# Patient Record
Sex: FEMALE | Race: WHITE | Employment: OTHER | URBAN - METROPOLITAN AREA
[De-identification: names, ages, dates, MRNs, and addresses within clinical notes are randomized per-mention and may not be internally consistent; named-entity substitution may affect disease eponyms.]

---

## 2017-03-22 ENCOUNTER — OFFICE VISIT (OUTPATIENT)
Dept: FAMILY MEDICINE CLINIC | Age: 82
End: 2017-03-22

## 2017-03-22 ENCOUNTER — TELEPHONE (OUTPATIENT)
Dept: FAMILY MEDICINE CLINIC | Age: 82
End: 2017-03-22

## 2017-03-22 VITALS
BODY MASS INDEX: 23.13 KG/M2 | OXYGEN SATURATION: 96 % | HEART RATE: 80 BPM | SYSTOLIC BLOOD PRESSURE: 122 MMHG | DIASTOLIC BLOOD PRESSURE: 80 MMHG | WEIGHT: 139 LBS

## 2017-03-22 DIAGNOSIS — G25.2 INTENTION TREMOR: ICD-10-CM

## 2017-03-22 DIAGNOSIS — G25.2 RESTING TREMOR: ICD-10-CM

## 2017-03-22 DIAGNOSIS — E03.9 ACQUIRED HYPOTHYROIDISM: ICD-10-CM

## 2017-03-22 DIAGNOSIS — L98.9 FACIAL LESION: Primary | ICD-10-CM

## 2017-03-22 DIAGNOSIS — H61.21 IMPACTED CERUMEN OF RIGHT EAR: ICD-10-CM

## 2017-03-22 LAB
T4 FREE: 1.7 NG/DL (ref 0.9–1.8)
TSH REFLEX FT4: 1.49 UIU/ML (ref 0.27–4.2)

## 2017-03-22 PROCEDURE — 99214 OFFICE O/P EST MOD 30 MIN: CPT | Performed by: FAMILY MEDICINE

## 2017-03-22 RX ORDER — DESONIDE 0.5 MG/G
CREAM TOPICAL
Qty: 1 TUBE | Refills: 0 | Status: SHIPPED | OUTPATIENT
Start: 2017-03-22 | End: 2019-09-10 | Stop reason: ALTCHOICE

## 2017-03-22 RX ORDER — TRIAMCINOLONE ACETONIDE 0.25 MG/G
CREAM TOPICAL
Qty: 1 TUBE | Refills: 0 | Status: SHIPPED | OUTPATIENT
Start: 2017-03-22 | End: 2019-09-10 | Stop reason: ALTCHOICE

## 2017-03-22 ASSESSMENT — ENCOUNTER SYMPTOMS
SHORTNESS OF BREATH: 0
ABDOMINAL PAIN: 0
NAUSEA: 0
EYE DISCHARGE: 0
COLOR CHANGE: 0
EYE REDNESS: 0

## 2017-03-27 RX ORDER — PROPRANOLOL HYDROCHLORIDE 20 MG/1
TABLET ORAL
Qty: 270 TABLET | Refills: 3 | Status: SHIPPED | OUTPATIENT
Start: 2017-03-27 | End: 2018-02-23 | Stop reason: SDUPTHER

## 2017-03-27 RX ORDER — LEVOTHYROXINE SODIUM 112 UG/1
TABLET ORAL
Qty: 90 TABLET | Refills: 3 | Status: SHIPPED | OUTPATIENT
Start: 2017-03-27 | End: 2018-02-23 | Stop reason: SDUPTHER

## 2017-05-04 ENCOUNTER — TELEPHONE (OUTPATIENT)
Dept: FAMILY MEDICINE CLINIC | Age: 82
End: 2017-05-04

## 2017-05-05 ENCOUNTER — OFFICE VISIT (OUTPATIENT)
Dept: FAMILY MEDICINE CLINIC | Age: 82
End: 2017-05-05

## 2017-05-05 VITALS
TEMPERATURE: 98.1 F | SYSTOLIC BLOOD PRESSURE: 132 MMHG | HEART RATE: 60 BPM | BODY MASS INDEX: 23.36 KG/M2 | RESPIRATION RATE: 16 BRPM | HEIGHT: 65 IN | DIASTOLIC BLOOD PRESSURE: 78 MMHG | OXYGEN SATURATION: 94 % | WEIGHT: 140.2 LBS

## 2017-05-05 DIAGNOSIS — M54.2 NECK PAIN: Primary | ICD-10-CM

## 2017-05-05 PROCEDURE — 3288F FALL RISK ASSESSMENT DOCD: CPT | Performed by: NURSE PRACTITIONER

## 2017-05-05 PROCEDURE — G8510 SCR DEP NEG, NO PLAN REQD: HCPCS | Performed by: NURSE PRACTITIONER

## 2017-05-05 PROCEDURE — 99213 OFFICE O/P EST LOW 20 MIN: CPT | Performed by: NURSE PRACTITIONER

## 2017-05-05 ASSESSMENT — ENCOUNTER SYMPTOMS
ALLERGIC/IMMUNOLOGIC NEGATIVE: 1
GASTROINTESTINAL NEGATIVE: 1
EYES NEGATIVE: 1
PHOTOPHOBIA: 0
TROUBLE SWALLOWING: 0
VISUAL CHANGE: 0
RESPIRATORY NEGATIVE: 1
BACK PAIN: 0

## 2017-05-05 ASSESSMENT — PATIENT HEALTH QUESTIONNAIRE - PHQ9
SUM OF ALL RESPONSES TO PHQ9 QUESTIONS 1 & 2: 0
2. FEELING DOWN, DEPRESSED OR HOPELESS: 0
1. LITTLE INTEREST OR PLEASURE IN DOING THINGS: 0
SUM OF ALL RESPONSES TO PHQ QUESTIONS 1-9: 0

## 2017-07-27 ENCOUNTER — OFFICE VISIT (OUTPATIENT)
Dept: FAMILY MEDICINE CLINIC | Age: 82
End: 2017-07-27

## 2017-07-27 VITALS
DIASTOLIC BLOOD PRESSURE: 78 MMHG | WEIGHT: 132 LBS | SYSTOLIC BLOOD PRESSURE: 138 MMHG | HEART RATE: 60 BPM | BODY MASS INDEX: 21.99 KG/M2 | HEIGHT: 65 IN | OXYGEN SATURATION: 96 %

## 2017-07-27 DIAGNOSIS — H61.23 BILATERAL IMPACTED CERUMEN: Primary | ICD-10-CM

## 2017-07-27 PROCEDURE — 99213 OFFICE O/P EST LOW 20 MIN: CPT | Performed by: NURSE PRACTITIONER

## 2017-07-27 ASSESSMENT — ENCOUNTER SYMPTOMS
SORE THROAT: 0
DIARRHEA: 0
ABDOMINAL PAIN: 0
RHINORRHEA: 0
COUGH: 0

## 2017-12-06 DIAGNOSIS — G25.0 TREMOR, ESSENTIAL: ICD-10-CM

## 2017-12-08 RX ORDER — PRIMIDONE 50 MG/1
TABLET ORAL
Qty: 270 TABLET | Refills: 3 | Status: SHIPPED | OUTPATIENT
Start: 2017-12-08 | End: 2018-09-27 | Stop reason: SDUPTHER

## 2017-12-21 ENCOUNTER — OFFICE VISIT (OUTPATIENT)
Dept: FAMILY MEDICINE CLINIC | Age: 82
End: 2017-12-21

## 2017-12-21 VITALS
WEIGHT: 136 LBS | BODY MASS INDEX: 22.66 KG/M2 | RESPIRATION RATE: 16 BRPM | OXYGEN SATURATION: 98 % | TEMPERATURE: 97.8 F | HEIGHT: 65 IN | DIASTOLIC BLOOD PRESSURE: 70 MMHG | HEART RATE: 63 BPM | SYSTOLIC BLOOD PRESSURE: 152 MMHG

## 2017-12-21 DIAGNOSIS — R05.9 COUGH: Primary | ICD-10-CM

## 2017-12-21 DIAGNOSIS — J06.9 UPPER RESPIRATORY TRACT INFECTION, UNSPECIFIED TYPE: ICD-10-CM

## 2017-12-21 PROCEDURE — 1036F TOBACCO NON-USER: CPT | Performed by: NURSE PRACTITIONER

## 2017-12-21 PROCEDURE — G8484 FLU IMMUNIZE NO ADMIN: HCPCS | Performed by: NURSE PRACTITIONER

## 2017-12-21 PROCEDURE — 99213 OFFICE O/P EST LOW 20 MIN: CPT | Performed by: NURSE PRACTITIONER

## 2017-12-21 PROCEDURE — 1123F ACP DISCUSS/DSCN MKR DOCD: CPT | Performed by: NURSE PRACTITIONER

## 2017-12-21 PROCEDURE — 1090F PRES/ABSN URINE INCON ASSESS: CPT | Performed by: NURSE PRACTITIONER

## 2017-12-21 PROCEDURE — G8427 DOCREV CUR MEDS BY ELIG CLIN: HCPCS | Performed by: NURSE PRACTITIONER

## 2017-12-21 PROCEDURE — G8420 CALC BMI NORM PARAMETERS: HCPCS | Performed by: NURSE PRACTITIONER

## 2017-12-21 PROCEDURE — 4040F PNEUMOC VAC/ADMIN/RCVD: CPT | Performed by: NURSE PRACTITIONER

## 2017-12-21 PROCEDURE — G8399 PT W/DXA RESULTS DOCUMENT: HCPCS | Performed by: NURSE PRACTITIONER

## 2017-12-21 ASSESSMENT — ENCOUNTER SYMPTOMS
SHORTNESS OF BREATH: 0
SORE THROAT: 0
HEMOPTYSIS: 0
COUGH: 1
WHEEZING: 0
RHINORRHEA: 0

## 2017-12-21 NOTE — PROGRESS NOTES
Subjective:      Patient ID: Annie Boogie is a 81 y.o. female. Robin Wilks is here with complaints of cough for the past 4-5 days, feeling slightly tired. Cough is productive every once in a while. Has seasonal allergies and chronic post nasal drip. She denies sore throat, fever, shortness of breath or chest pain. Cough   This is a new problem. The current episode started 1 to 4 weeks ago. The cough is productive of sputum. Associated symptoms include postnasal drip (chronic). Pertinent negatives include no chills, ear congestion, ear pain, fever, headaches, hemoptysis, myalgias, nasal congestion, rash, rhinorrhea, sore throat, shortness of breath, sweats, weight loss or wheezing. Review of Systems   Constitutional: Negative for chills, fever and weight loss. HENT: Positive for postnasal drip (chronic). Negative for ear pain, rhinorrhea and sore throat. Respiratory: Positive for cough. Negative for hemoptysis, shortness of breath and wheezing. Musculoskeletal: Negative for myalgias. Skin: Negative for rash. Neurological: Negative for headaches. Vitals:    12/21/17 1540   BP: (!) 152/70   Site: Left Arm   Position: Sitting   Pulse: 63   Resp: 16   Temp: 97.8 °F (36.6 °C)   TempSrc: Oral   SpO2: 98%   Weight: 136 lb (61.7 kg)   Height: 5' 5\" (1.651 m)     Objective:   Physical Exam   Constitutional: She appears well-developed and well-nourished. No distress. HENT:   Head: Normocephalic and atraumatic. Right Ear: External ear normal.   Left Ear: External ear normal.   Nose: Nose normal.   Mouth/Throat: Oropharynx is clear and moist. No oropharyngeal exudate. Neck: Normal range of motion. Neck supple. Cardiovascular: Normal rate, regular rhythm and normal heart sounds. Exam reveals no gallop and no friction rub. No murmur heard. Pulmonary/Chest: Effort normal and breath sounds normal. No respiratory distress. She has no wheezes. She has no rales. She exhibits no tenderness. Clear, good air movement   Lymphadenopathy:     She has no cervical adenopathy. Neurological: She is alert. No cranial nerve deficit. Benign tremor   Skin: Skin is warm and dry. No rash noted. She is not diaphoretic. No erythema. Nursing note and vitals reviewed. Assessment:   1. Upper respiratory tract infection, unspecified type    2. Cough    Plan:   - Discussed symptoms likely viral in nature and that antibiotics would be beneficial  - Supportive treatments: fluids, rest, acetaminophen PRN and Mucinex DM   - She will follow up in of the office in 1-2 weeks if her symptoms do not improve or sooner if symptoms worsen. - She is agreeable to above plan    Outpatient Encounter Prescriptions as of 12/21/2017   Medication Sig Dispense Refill    primidone (MYSOLINE) 50 MG tablet TAKE 1 TABLET THREE TIMES DAILY 270 tablet 3    levothyroxine (SYNTHROID) 112 MCG tablet TAKE 1 TABLET EVERY DAY 90 tablet 3    propranolol (INDERAL) 20 MG tablet TAKE 1 TABLET THREE TIMES DAILY 270 tablet 3    desonide (DESOWEN) 0.05 % cream Apply topically 2 times daily. 1 Tube 0    triamcinolone (KENALOG) 0.025 % cream Apply topically 1 times daily for 2 weeks. 1 Tube 0    clotrimazole-betamethasone (LOTRISONE) 1-0.05 % cream Apply topically 2 times daily. 1 Tube 1    loratadine (CLEAR-ATADINE) 10 MG tablet Take 10 mg by mouth daily.  aspirin 81 MG chewable tablet Take 81 mg by mouth daily.  docusate sodium (COLACE) 100 MG capsule Take 100 mg by mouth as needed.  ibuprofen (ADVIL;MOTRIN) 200 MG CAPS Take 1 capsule by mouth as needed.  Multiple Vitamin (MULTI-VITAMIN) TABS Take 1 tablet by mouth as needed. No facility-administered encounter medications on file as of 12/21/2017.

## 2018-02-23 RX ORDER — PROPRANOLOL HYDROCHLORIDE 20 MG/1
TABLET ORAL
Qty: 270 TABLET | Refills: 3 | Status: SHIPPED | OUTPATIENT
Start: 2018-02-23 | End: 2018-10-17

## 2018-02-23 RX ORDER — LEVOTHYROXINE SODIUM 112 UG/1
TABLET ORAL
Qty: 90 TABLET | Refills: 3 | Status: SHIPPED | OUTPATIENT
Start: 2018-02-23 | End: 2019-10-30 | Stop reason: SDUPTHER

## 2018-04-25 ENCOUNTER — OFFICE VISIT (OUTPATIENT)
Dept: FAMILY MEDICINE CLINIC | Age: 83
End: 2018-04-25

## 2018-04-25 VITALS
BODY MASS INDEX: 22.66 KG/M2 | HEART RATE: 55 BPM | HEIGHT: 65 IN | OXYGEN SATURATION: 92 % | WEIGHT: 136 LBS | SYSTOLIC BLOOD PRESSURE: 156 MMHG | DIASTOLIC BLOOD PRESSURE: 80 MMHG

## 2018-04-25 DIAGNOSIS — H61.23 BILATERAL IMPACTED CERUMEN: Primary | ICD-10-CM

## 2018-04-25 PROCEDURE — 1036F TOBACCO NON-USER: CPT | Performed by: FAMILY MEDICINE

## 2018-04-25 PROCEDURE — G8399 PT W/DXA RESULTS DOCUMENT: HCPCS | Performed by: FAMILY MEDICINE

## 2018-04-25 PROCEDURE — 1123F ACP DISCUSS/DSCN MKR DOCD: CPT | Performed by: FAMILY MEDICINE

## 2018-04-25 PROCEDURE — 4040F PNEUMOC VAC/ADMIN/RCVD: CPT | Performed by: FAMILY MEDICINE

## 2018-04-25 PROCEDURE — 99213 OFFICE O/P EST LOW 20 MIN: CPT | Performed by: FAMILY MEDICINE

## 2018-04-25 PROCEDURE — G8427 DOCREV CUR MEDS BY ELIG CLIN: HCPCS | Performed by: FAMILY MEDICINE

## 2018-04-25 PROCEDURE — 1090F PRES/ABSN URINE INCON ASSESS: CPT | Performed by: FAMILY MEDICINE

## 2018-04-25 PROCEDURE — G8420 CALC BMI NORM PARAMETERS: HCPCS | Performed by: FAMILY MEDICINE

## 2018-04-25 ASSESSMENT — ENCOUNTER SYMPTOMS
SHORTNESS OF BREATH: 0
DIARRHEA: 0
SINUS PRESSURE: 0
CONSTIPATION: 0
SINUS PAIN: 0
NAUSEA: 0
VOMITING: 0
EYE REDNESS: 0

## 2018-07-20 ENCOUNTER — OFFICE VISIT (OUTPATIENT)
Dept: FAMILY MEDICINE CLINIC | Age: 83
End: 2018-07-20

## 2018-07-20 VITALS
DIASTOLIC BLOOD PRESSURE: 80 MMHG | BODY MASS INDEX: 22.59 KG/M2 | WEIGHT: 135.6 LBS | HEART RATE: 57 BPM | OXYGEN SATURATION: 97 % | SYSTOLIC BLOOD PRESSURE: 138 MMHG | HEIGHT: 65 IN

## 2018-07-20 DIAGNOSIS — Z00.00 ROUTINE GENERAL MEDICAL EXAMINATION AT A HEALTH CARE FACILITY: Primary | ICD-10-CM

## 2018-07-20 DIAGNOSIS — Z00.00 ROUTINE GENERAL MEDICAL EXAMINATION AT A HEALTH CARE FACILITY: ICD-10-CM

## 2018-07-20 LAB
A/G RATIO: 1.7 (ref 1.1–2.2)
ALBUMIN SERPL-MCNC: 4.4 G/DL (ref 3.4–5)
ALP BLD-CCNC: 53 U/L (ref 40–129)
ALT SERPL-CCNC: 15 U/L (ref 10–40)
ANION GAP SERPL CALCULATED.3IONS-SCNC: 14 MMOL/L (ref 3–16)
AST SERPL-CCNC: 21 U/L (ref 15–37)
BILIRUB SERPL-MCNC: 0.5 MG/DL (ref 0–1)
BUN BLDV-MCNC: 12 MG/DL (ref 7–20)
CALCIUM SERPL-MCNC: 9.7 MG/DL (ref 8.3–10.6)
CHLORIDE BLD-SCNC: 101 MMOL/L (ref 99–110)
CHOLESTEROL, TOTAL: 169 MG/DL (ref 0–199)
CO2: 28 MMOL/L (ref 21–32)
CREAT SERPL-MCNC: 0.7 MG/DL (ref 0.6–1.2)
GFR AFRICAN AMERICAN: >60
GFR NON-AFRICAN AMERICAN: >60
GLOBULIN: 2.6 G/DL
GLUCOSE BLD-MCNC: 84 MG/DL (ref 70–99)
HCT VFR BLD CALC: 37.5 % (ref 36–48)
HDLC SERPL-MCNC: 73 MG/DL (ref 40–60)
HEMOGLOBIN: 12.4 G/DL (ref 12–16)
LDL CHOLESTEROL CALCULATED: 79 MG/DL
MCH RBC QN AUTO: 32.8 PG (ref 26–34)
MCHC RBC AUTO-ENTMCNC: 33.2 G/DL (ref 31–36)
MCV RBC AUTO: 98.7 FL (ref 80–100)
PDW BLD-RTO: 13.8 % (ref 12.4–15.4)
PLATELET # BLD: 206 K/UL (ref 135–450)
PMV BLD AUTO: 8.4 FL (ref 5–10.5)
POTASSIUM SERPL-SCNC: 3.9 MMOL/L (ref 3.5–5.1)
RBC # BLD: 3.8 M/UL (ref 4–5.2)
SODIUM BLD-SCNC: 143 MMOL/L (ref 136–145)
TOTAL PROTEIN: 7 G/DL (ref 6.4–8.2)
TRIGL SERPL-MCNC: 84 MG/DL (ref 0–150)
TSH REFLEX FT4: 3.55 UIU/ML (ref 0.27–4.2)
VLDLC SERPL CALC-MCNC: 17 MG/DL
WBC # BLD: 5.1 K/UL (ref 4–11)

## 2018-07-20 PROCEDURE — 4040F PNEUMOC VAC/ADMIN/RCVD: CPT | Performed by: FAMILY MEDICINE

## 2018-07-20 PROCEDURE — G0438 PPPS, INITIAL VISIT: HCPCS | Performed by: FAMILY MEDICINE

## 2018-07-20 ASSESSMENT — PATIENT HEALTH QUESTIONNAIRE - PHQ9
SUM OF ALL RESPONSES TO PHQ QUESTIONS 1-9: 0
SUM OF ALL RESPONSES TO PHQ QUESTIONS 1-9: 0

## 2018-07-20 ASSESSMENT — LIFESTYLE VARIABLES: HOW OFTEN DO YOU HAVE A DRINK CONTAINING ALCOHOL: 0

## 2018-07-20 ASSESSMENT — ANXIETY QUESTIONNAIRES: GAD7 TOTAL SCORE: 0

## 2018-07-20 NOTE — PATIENT INSTRUCTIONS
Personalized Preventive Plan for Pablo Craig - 7/20/2018  Medicare offers a range of preventive health benefits. Some of the tests and screenings are paid in full while other may be subject to a deductible, co-insurance, and/or copay. Some of these benefits include a comprehensive review of your medical history including lifestyle, illnesses that may run in your family, and various assessments and screenings as appropriate. After reviewing your medical record and screening and assessments performed today your provider may have ordered immunizations, labs, imaging, and/or referrals for you. A list of these orders (if applicable) as well as your Preventive Care list are included within your After Visit Summary for your review. Other Preventive Recommendations:    · A preventive eye exam performed by an eye specialist is recommended every 1-2 years to screen for glaucoma; cataracts, macular degeneration, and other eye disorders. · A preventive dental visit is recommended every 6 months. · Try to get at least 150 minutes of exercise per week or 10,000 steps per day on a pedometer . · Order or download the FREE \"Exercise & Physical Activity: Your Everyday Guide\" from [employer]. Call 7-624.295.2539 or search [employer] online. · You need 5749-0698 mg of calcium and 1010-8267 IU of vitamin D per day. It is possible to meet your calcium requirement with diet alone, but a vitamin D supplement is usually necessary to meet this goal.  · When exposed to the sun, use a sunscreen that protects against both UVA and UVB radiation with an SPF of 30 or greater. Reapply every 2 to 3 hours or after sweating, drying off with a towel, or swimming. · Always wear a seat belt when traveling in a car. Always wear a helmet when riding a bicycle or motorcycle. Your fasting blood sugar should be below 100.    If it is between 100-125 that is considered high and known as prediabetes, or  impaired glucose tolerance. If your blood sugar is too high, go to diabetes. org for a diabetes prevention diet. If your blood sugar is above 125 on 2 occassions, fasting, then that meets the criteria for  diagnosis of diabetes. For your weight, exercise 30 minutes 5 times weekly and improve the types of food you eat:    Protein   Best options: The American Diabetes Association (ADA) recommends lean proteins low in saturated fat, like fish or turkey. Aim for two servings of seafood each week; some fish, like salmon, have the added benefit of containing heart healthy omega-3 fats. For a vegetarian protein source, experiment with the wide variety of beans. Nuts, which are protein and healthy fats powerhouses, are also a choice.  Worst options: Processed deli meats and hot dogs have high amounts of fat along with lots of sodium, which can increase the risk of high blood pressure. Heart attack and stroke are two common complications of diabetes, so keeping blood pressure in check is important. Grains   Best options: When choosing grains, make sure theyre whole. Decrease the amount of foods which contain flour. Whole grains such as wild rice, quinoa, and whole grain breads and cereals contain fiber, which is beneficial for digestive health, but often the grains flour products raise your blood sugar and when your blood sugar returns to normal, it can trigger the desire to eat again.   Worst options: Refined white flour doesnt contain the same health benefits as whole grains. Processed foods made with white flour include breakfast cereals, white bread, and pastries, cookies, muffins, pretzels, crackers, so avoid these options. Also try to steer clear of white rice and pasta. Dairy   Best options: With only 6 to 8 grams of carbohydrates in a serving, plain nonfat [place] yogurt is a healthy and versatile dairy option.  You can add berries and enjoy it for dessert or breakfast; you can use it in recipes as a replacement for sour cream, which is high in saturated fat. Skim milk.  Worst options: Avoid all full-fat dairy products and especially packaged chocolate milk, as it also has added sugar. Avoid yogurts with added sugar. Vegetables   Best options: Non-starchy vegetables such as leafy greens, broccoli, cauliflower, asparagus, and carrots are low in carbohydrates and high in fiber and other nutrients, Alec Salazar says. You can eat non-starchy vegetables in abundance  half of your plate should be filled with these veggies. If youre craving mashed potatoes, give mashed cauliflower a try.  Worst options: Stick to small portions of starchy vegetables such as corn, potatoes, and peas. These items are nutritious, but should be eaten in moderation. The ADA groups them with grains because of their high carb content. Fruit   Best options: Fresh fruit can conquer your craving for sweets while providing antioxidants and fiber. Berries are a great option because recommended portion sizes are typically generous, which may leave you feeling more satisfied   Worst options: Avoid added sugar by eliminating fruits canned in syrup, and be aware that dried fruits have a very high sugar concentration. Also, fruit juices should be consumed rarely as theyre high in sugar and dont contain the same nutrients as whole fruit. Fats   Best options: Some types of fat actually help protect your heart. Choose the monounsaturated fats found in avocados, almonds, olives, and pecans or the polyunsaturated fats found in walnuts and sunflower oil, which can help to lower bad cholesterol.  Worst options: Saturated fats increase bad cholesterol, so limit butter, cheese, gravy, and fried foods. Keep calories from animal sources of saturated fat to less than 10 percent of your total daily intake. Trans fats are even worse than saturated fats, so avoid them completely.  Look for the term hydrogenated on labels of processed foods supplement which can raise the HDL good cholesterol. 2-4 grams a day is recommended. Learn more on the Internet. Check out these resources:    American Heart Association   Heart. [address]:   [employer]    Triglycerides can be lowered without medication by exercising, and loosing weight and eating a diet lower in carbohydrates especially from flour sources,  and avoiding simple sugars. Omega 3 fatty acids can help lower triglyceride levels, 2-4 grams a day. The good cholesterol is HDL. In order to increase the good cholesterol, increasing cardiovascular exercise helps. Avoid hydrogenated oils (trans fats) in processed, bakery,  and junk food. Use monounsaturated fats such as olive oil can help raise the good cholesterol.

## 2018-09-27 DIAGNOSIS — G25.0 TREMOR, ESSENTIAL: ICD-10-CM

## 2018-09-27 RX ORDER — PRIMIDONE 50 MG/1
TABLET ORAL
Qty: 270 TABLET | Refills: 3 | Status: SHIPPED | OUTPATIENT
Start: 2018-09-27 | End: 2019-07-15 | Stop reason: SDUPTHER

## 2018-10-17 ENCOUNTER — OFFICE VISIT (OUTPATIENT)
Dept: FAMILY MEDICINE CLINIC | Age: 83
End: 2018-10-17
Payer: MEDICARE

## 2018-10-17 VITALS
BODY MASS INDEX: 22.99 KG/M2 | OXYGEN SATURATION: 92 % | HEART RATE: 51 BPM | HEIGHT: 65 IN | WEIGHT: 138 LBS | DIASTOLIC BLOOD PRESSURE: 82 MMHG | SYSTOLIC BLOOD PRESSURE: 154 MMHG

## 2018-10-17 DIAGNOSIS — H61.23 BILATERAL IMPACTED CERUMEN: ICD-10-CM

## 2018-10-17 DIAGNOSIS — I10 ESSENTIAL HYPERTENSION: Primary | ICD-10-CM

## 2018-10-17 DIAGNOSIS — Z91.81 AT HIGH RISK FOR FALLS: ICD-10-CM

## 2018-10-17 DIAGNOSIS — J47.9 BRONCHIOLECTASIS (HCC): ICD-10-CM

## 2018-10-17 DIAGNOSIS — G25.0 TREMOR, ESSENTIAL: ICD-10-CM

## 2018-10-17 PROCEDURE — 1123F ACP DISCUSS/DSCN MKR DOCD: CPT | Performed by: FAMILY MEDICINE

## 2018-10-17 PROCEDURE — G0008 ADMIN INFLUENZA VIRUS VAC: HCPCS | Performed by: FAMILY MEDICINE

## 2018-10-17 PROCEDURE — G8427 DOCREV CUR MEDS BY ELIG CLIN: HCPCS | Performed by: FAMILY MEDICINE

## 2018-10-17 PROCEDURE — G8420 CALC BMI NORM PARAMETERS: HCPCS | Performed by: FAMILY MEDICINE

## 2018-10-17 PROCEDURE — G0009 ADMIN PNEUMOCOCCAL VACCINE: HCPCS | Performed by: FAMILY MEDICINE

## 2018-10-17 PROCEDURE — 1036F TOBACCO NON-USER: CPT | Performed by: FAMILY MEDICINE

## 2018-10-17 PROCEDURE — 1101F PT FALLS ASSESS-DOCD LE1/YR: CPT | Performed by: FAMILY MEDICINE

## 2018-10-17 PROCEDURE — 4040F PNEUMOC VAC/ADMIN/RCVD: CPT | Performed by: FAMILY MEDICINE

## 2018-10-17 PROCEDURE — G8482 FLU IMMUNIZE ORDER/ADMIN: HCPCS | Performed by: FAMILY MEDICINE

## 2018-10-17 PROCEDURE — 99214 OFFICE O/P EST MOD 30 MIN: CPT | Performed by: FAMILY MEDICINE

## 2018-10-17 PROCEDURE — 90662 IIV NO PRSV INCREASED AG IM: CPT | Performed by: FAMILY MEDICINE

## 2018-10-17 PROCEDURE — 90670 PCV13 VACCINE IM: CPT | Performed by: FAMILY MEDICINE

## 2018-10-17 PROCEDURE — G8399 PT W/DXA RESULTS DOCUMENT: HCPCS | Performed by: FAMILY MEDICINE

## 2018-10-17 PROCEDURE — 1090F PRES/ABSN URINE INCON ASSESS: CPT | Performed by: FAMILY MEDICINE

## 2018-10-17 RX ORDER — PROPRANOLOL HYDROCHLORIDE 20 MG/1
TABLET ORAL
Qty: 270 TABLET | Refills: 3 | Status: CANCELLED | OUTPATIENT
Start: 2018-10-17

## 2018-10-17 RX ORDER — PROPRANOLOL HYDROCHLORIDE 80 MG/1
80 CAPSULE, EXTENDED RELEASE ORAL DAILY
Qty: 90 CAPSULE | Refills: 3 | Status: SHIPPED | OUTPATIENT
Start: 2018-10-17 | End: 2018-11-02

## 2018-10-17 RX ORDER — PROPRANOLOL HYDROCHLORIDE 80 MG/1
80 CAPSULE, EXTENDED RELEASE ORAL DAILY
Qty: 30 CAPSULE | Refills: 3 | Status: SHIPPED | OUTPATIENT
Start: 2018-10-17 | End: 2018-10-17 | Stop reason: SDUPTHER

## 2018-10-17 ASSESSMENT — ENCOUNTER SYMPTOMS
NAUSEA: 0
EYE REDNESS: 0
SHORTNESS OF BREATH: 0
VOMITING: 0
CONSTIPATION: 0
DIARRHEA: 0

## 2018-10-19 RX ORDER — METOPROLOL SUCCINATE 100 MG/1
100 TABLET, EXTENDED RELEASE ORAL DAILY
Qty: 30 TABLET | Refills: 3 | Status: SHIPPED | OUTPATIENT
Start: 2018-10-19 | End: 2018-11-20

## 2018-10-19 NOTE — TELEPHONE ENCOUNTER
Per pt she  Stated that the Propranolol is to expensive so she called [facility] and they told her that Carvedilol would be better out of [ocket for her.   misty would like to know if you can send that to [address] for her

## 2018-10-31 ENCOUNTER — TELEPHONE (OUTPATIENT)
Dept: FAMILY MEDICINE CLINIC | Age: 83
End: 2018-10-31

## 2018-10-31 ASSESSMENT — ENCOUNTER SYMPTOMS
VOMITING: 0
NAUSEA: 0
SHORTNESS OF BREATH: 0
EYE REDNESS: 0
CONSTIPATION: 0
DIARRHEA: 0

## 2018-10-31 NOTE — TELEPHONE ENCOUNTER
Patient called in and stated that she was really shaky. She said that her medication was changed recently. The message from last week stated that the propranolol was too expensive so metoprolol was sent in to replace it. But the patient states that she did not say that and she needs the propranolol. She was under the impression that the metoprolol had the propranolol in it. I think she is confused on what she is suppose to be taking along with the message from 10/19 not being accurate.

## 2018-11-01 NOTE — PROGRESS NOTES
as needed.  ibuprofen (ADVIL;MOTRIN) 200 MG CAPS Take 1 capsule by mouth as needed.  Multiple Vitamin (MULTI-VITAMIN) TABS Take 1 tablet by mouth as needed.  metoprolol succinate (TOPROL XL) 100 MG extended release tablet Take 1 tablet by mouth daily 30 tablet 3     No current facility-administered medications for this visit. Vitals:    11/02/18 1115   BP: (!) 166/82   Pulse:    SpO2:          Physical Exam  Physical Exam   Constitutional: She is oriented to person, place, and time. She appears well-developed and well-nourished. No distress. HENT:   Head: Normocephalic and atraumatic. She does have 2 sutures in her lower left jaw and no pus coming from it but there is erythema around the gumline. Eyes: Conjunctivae are normal. No scleral icterus. Cardiovascular: Normal rate. Pulmonary/Chest: Effort normal. No stridor. Musculoskeletal: She exhibits no edema. Neurological: She is alert and oriented to person, place, and time. Tremors chronic stable   Skin: Skin is warm. She is not diaphoretic. No erythema. Psychiatric: She has a normal mood and affect. Her behavior is normal. Judgment and thought content normal.        Diagnosis Orders   1. Uncontrolled hypertension     2. Essential tremor     3. Family history of first degree relative with dementia  Darryl Pace, James Jurado MD, Neurology, [facility]         Uncontrolled hypertension  We will put her back on a propranolol 3 times a day for her tremors, this does not control her blood pressure so we will add a different type of blood pressure medicine such as lisinopril today. We try a beta blocker to control both tremor and blood pressure but it wasn't effective and she did not tolerate the medication because of the worse. Essential tremor    Family history of first degree relative with dementia  -     Sincere Gómez MD, Neurology, [facility]    Other orders  -     propranolol (INDERAL) 20 MG tablet;  Take 1 tablet by mouth 3 times daily  -     lisinopril (PRINIVIL;ZESTRIL) 10 MG tablet; Take 1 tablet by mouth daily          An electronic signature was used to authenticate this note. This was dictated. Errors mightbe possible due to dictation.   --Rod Hansen, DO

## 2018-11-02 ENCOUNTER — OFFICE VISIT (OUTPATIENT)
Dept: FAMILY MEDICINE CLINIC | Age: 83
End: 2018-11-02
Payer: MEDICARE

## 2018-11-02 VITALS
DIASTOLIC BLOOD PRESSURE: 82 MMHG | OXYGEN SATURATION: 94 % | SYSTOLIC BLOOD PRESSURE: 166 MMHG | BODY MASS INDEX: 22.99 KG/M2 | HEIGHT: 65 IN | WEIGHT: 138 LBS | HEART RATE: 67 BPM

## 2018-11-02 DIAGNOSIS — Z81.8 FAMILY HISTORY OF FIRST DEGREE RELATIVE WITH DEMENTIA: ICD-10-CM

## 2018-11-02 DIAGNOSIS — G25.0 ESSENTIAL TREMOR: ICD-10-CM

## 2018-11-02 DIAGNOSIS — I10 UNCONTROLLED HYPERTENSION: Primary | ICD-10-CM

## 2018-11-02 PROCEDURE — G8399 PT W/DXA RESULTS DOCUMENT: HCPCS | Performed by: FAMILY MEDICINE

## 2018-11-02 PROCEDURE — 1101F PT FALLS ASSESS-DOCD LE1/YR: CPT | Performed by: FAMILY MEDICINE

## 2018-11-02 PROCEDURE — G8482 FLU IMMUNIZE ORDER/ADMIN: HCPCS | Performed by: FAMILY MEDICINE

## 2018-11-02 PROCEDURE — G8420 CALC BMI NORM PARAMETERS: HCPCS | Performed by: FAMILY MEDICINE

## 2018-11-02 PROCEDURE — 1036F TOBACCO NON-USER: CPT | Performed by: FAMILY MEDICINE

## 2018-11-02 PROCEDURE — 99214 OFFICE O/P EST MOD 30 MIN: CPT | Performed by: FAMILY MEDICINE

## 2018-11-02 PROCEDURE — 4040F PNEUMOC VAC/ADMIN/RCVD: CPT | Performed by: FAMILY MEDICINE

## 2018-11-02 PROCEDURE — 1123F ACP DISCUSS/DSCN MKR DOCD: CPT | Performed by: FAMILY MEDICINE

## 2018-11-02 PROCEDURE — G8427 DOCREV CUR MEDS BY ELIG CLIN: HCPCS | Performed by: FAMILY MEDICINE

## 2018-11-02 PROCEDURE — 1090F PRES/ABSN URINE INCON ASSESS: CPT | Performed by: FAMILY MEDICINE

## 2018-11-02 RX ORDER — LISINOPRIL 10 MG/1
10 TABLET ORAL DAILY
Qty: 90 TABLET | Refills: 3 | Status: SHIPPED | OUTPATIENT
Start: 2018-11-02 | End: 2019-09-10

## 2018-11-02 RX ORDER — PROPRANOLOL HYDROCHLORIDE 20 MG/1
20 TABLET ORAL 3 TIMES DAILY
Qty: 90 TABLET | Refills: 3 | Status: SHIPPED | OUTPATIENT
Start: 2018-11-02 | End: 2018-11-20 | Stop reason: SDUPTHER

## 2018-11-19 ASSESSMENT — ENCOUNTER SYMPTOMS
NAUSEA: 0
CONSTIPATION: 0
SHORTNESS OF BREATH: 0
EYE REDNESS: 0
DIARRHEA: 0
VOMITING: 0

## 2018-11-20 ENCOUNTER — OFFICE VISIT (OUTPATIENT)
Dept: FAMILY MEDICINE CLINIC | Age: 83
End: 2018-11-20
Payer: MEDICARE

## 2018-11-20 VITALS
OXYGEN SATURATION: 93 % | HEART RATE: 58 BPM | HEIGHT: 65 IN | DIASTOLIC BLOOD PRESSURE: 84 MMHG | WEIGHT: 136 LBS | BODY MASS INDEX: 22.66 KG/M2 | SYSTOLIC BLOOD PRESSURE: 152 MMHG

## 2018-11-20 DIAGNOSIS — G25.0 TREMOR, ESSENTIAL: ICD-10-CM

## 2018-11-20 DIAGNOSIS — I10 UNCONTROLLED HYPERTENSION: Primary | ICD-10-CM

## 2018-11-20 PROCEDURE — 1036F TOBACCO NON-USER: CPT | Performed by: FAMILY MEDICINE

## 2018-11-20 PROCEDURE — 1123F ACP DISCUSS/DSCN MKR DOCD: CPT | Performed by: FAMILY MEDICINE

## 2018-11-20 PROCEDURE — G8399 PT W/DXA RESULTS DOCUMENT: HCPCS | Performed by: FAMILY MEDICINE

## 2018-11-20 PROCEDURE — G8427 DOCREV CUR MEDS BY ELIG CLIN: HCPCS | Performed by: FAMILY MEDICINE

## 2018-11-20 PROCEDURE — G8482 FLU IMMUNIZE ORDER/ADMIN: HCPCS | Performed by: FAMILY MEDICINE

## 2018-11-20 PROCEDURE — 1101F PT FALLS ASSESS-DOCD LE1/YR: CPT | Performed by: FAMILY MEDICINE

## 2018-11-20 PROCEDURE — 4040F PNEUMOC VAC/ADMIN/RCVD: CPT | Performed by: FAMILY MEDICINE

## 2018-11-20 PROCEDURE — 1090F PRES/ABSN URINE INCON ASSESS: CPT | Performed by: FAMILY MEDICINE

## 2018-11-20 PROCEDURE — 99214 OFFICE O/P EST MOD 30 MIN: CPT | Performed by: FAMILY MEDICINE

## 2018-11-20 PROCEDURE — G8420 CALC BMI NORM PARAMETERS: HCPCS | Performed by: FAMILY MEDICINE

## 2018-11-20 RX ORDER — LISINOPRIL 20 MG/1
20 TABLET ORAL DAILY
Qty: 90 TABLET | Refills: 1 | Status: CANCELLED | OUTPATIENT
Start: 2018-11-20

## 2018-11-20 RX ORDER — PROPRANOLOL HYDROCHLORIDE 20 MG/1
20 TABLET ORAL 4 TIMES DAILY
Qty: 120 TABLET | Refills: 3 | Status: SHIPPED | OUTPATIENT
Start: 2018-11-20 | End: 2019-04-18 | Stop reason: ALTCHOICE

## 2019-02-12 ENCOUNTER — INITIAL CONSULT (OUTPATIENT)
Dept: NEUROLOGY | Age: 84
End: 2019-02-12
Payer: MEDICARE

## 2019-02-12 VITALS
BODY MASS INDEX: 22.66 KG/M2 | SYSTOLIC BLOOD PRESSURE: 145 MMHG | WEIGHT: 136 LBS | OXYGEN SATURATION: 96 % | DIASTOLIC BLOOD PRESSURE: 75 MMHG | HEART RATE: 93 BPM | HEIGHT: 65 IN

## 2019-02-12 DIAGNOSIS — E03.9 ACQUIRED HYPOTHYROIDISM: ICD-10-CM

## 2019-02-12 DIAGNOSIS — I10 HTN (HYPERTENSION), BENIGN: ICD-10-CM

## 2019-02-12 DIAGNOSIS — G25.0 ESSENTIAL TREMOR: ICD-10-CM

## 2019-02-12 DIAGNOSIS — G30.9 DEMENTIA DUE TO ALZHEIMER'S DISEASE (HCC): Primary | ICD-10-CM

## 2019-02-12 DIAGNOSIS — F02.80 DEMENTIA DUE TO ALZHEIMER'S DISEASE (HCC): Primary | ICD-10-CM

## 2019-02-12 PROCEDURE — 4040F PNEUMOC VAC/ADMIN/RCVD: CPT | Performed by: PSYCHIATRY & NEUROLOGY

## 2019-02-12 PROCEDURE — G8482 FLU IMMUNIZE ORDER/ADMIN: HCPCS | Performed by: PSYCHIATRY & NEUROLOGY

## 2019-02-12 PROCEDURE — 1123F ACP DISCUSS/DSCN MKR DOCD: CPT | Performed by: PSYCHIATRY & NEUROLOGY

## 2019-02-12 PROCEDURE — 1090F PRES/ABSN URINE INCON ASSESS: CPT | Performed by: PSYCHIATRY & NEUROLOGY

## 2019-02-12 PROCEDURE — 1101F PT FALLS ASSESS-DOCD LE1/YR: CPT | Performed by: PSYCHIATRY & NEUROLOGY

## 2019-02-12 PROCEDURE — G8420 CALC BMI NORM PARAMETERS: HCPCS | Performed by: PSYCHIATRY & NEUROLOGY

## 2019-02-12 PROCEDURE — G8399 PT W/DXA RESULTS DOCUMENT: HCPCS | Performed by: PSYCHIATRY & NEUROLOGY

## 2019-02-12 PROCEDURE — 99204 OFFICE O/P NEW MOD 45 MIN: CPT | Performed by: PSYCHIATRY & NEUROLOGY

## 2019-02-12 PROCEDURE — G8427 DOCREV CUR MEDS BY ELIG CLIN: HCPCS | Performed by: PSYCHIATRY & NEUROLOGY

## 2019-02-12 PROCEDURE — 1036F TOBACCO NON-USER: CPT | Performed by: PSYCHIATRY & NEUROLOGY

## 2019-02-12 RX ORDER — DONEPEZIL HYDROCHLORIDE 5 MG/1
5 TABLET, FILM COATED ORAL NIGHTLY
Qty: 30 TABLET | Refills: 2 | Status: SHIPPED | OUTPATIENT
Start: 2019-02-12 | End: 2019-04-18 | Stop reason: SDUPTHER

## 2019-02-14 ENCOUNTER — NURSE TRIAGE (OUTPATIENT)
Dept: OTHER | Facility: CLINIC | Age: 84
End: 2019-02-14

## 2019-02-14 ASSESSMENT — ENCOUNTER SYMPTOMS
DIARRHEA: 0
NAUSEA: 0
SHORTNESS OF BREATH: 0
VOMITING: 0
CONSTIPATION: 0
EYE REDNESS: 0

## 2019-02-15 ENCOUNTER — OFFICE VISIT (OUTPATIENT)
Dept: FAMILY MEDICINE CLINIC | Age: 84
End: 2019-02-15
Payer: MEDICARE

## 2019-02-15 VITALS
OXYGEN SATURATION: 97 % | BODY MASS INDEX: 22.66 KG/M2 | SYSTOLIC BLOOD PRESSURE: 124 MMHG | HEIGHT: 65 IN | WEIGHT: 136 LBS | HEART RATE: 59 BPM | DIASTOLIC BLOOD PRESSURE: 66 MMHG

## 2019-02-15 DIAGNOSIS — L60.0 INGROWN TOENAIL OF LEFT FOOT WITH INFECTION: ICD-10-CM

## 2019-02-15 DIAGNOSIS — I10 ESSENTIAL HYPERTENSION: Primary | ICD-10-CM

## 2019-02-15 LAB
FOLATE: >20 NG/ML (ref 4.78–24.2)
TSH SERPL DL<=0.05 MIU/L-ACNC: 3.51 UIU/ML (ref 0.27–4.2)
VITAMIN B-12: 725 PG/ML (ref 211–911)

## 2019-02-15 PROCEDURE — G8482 FLU IMMUNIZE ORDER/ADMIN: HCPCS | Performed by: FAMILY MEDICINE

## 2019-02-15 PROCEDURE — 4040F PNEUMOC VAC/ADMIN/RCVD: CPT | Performed by: FAMILY MEDICINE

## 2019-02-15 PROCEDURE — G8427 DOCREV CUR MEDS BY ELIG CLIN: HCPCS | Performed by: FAMILY MEDICINE

## 2019-02-15 PROCEDURE — 1123F ACP DISCUSS/DSCN MKR DOCD: CPT | Performed by: FAMILY MEDICINE

## 2019-02-15 PROCEDURE — 99214 OFFICE O/P EST MOD 30 MIN: CPT | Performed by: FAMILY MEDICINE

## 2019-02-15 PROCEDURE — G8399 PT W/DXA RESULTS DOCUMENT: HCPCS | Performed by: FAMILY MEDICINE

## 2019-02-15 PROCEDURE — 1090F PRES/ABSN URINE INCON ASSESS: CPT | Performed by: FAMILY MEDICINE

## 2019-02-15 PROCEDURE — G8420 CALC BMI NORM PARAMETERS: HCPCS | Performed by: FAMILY MEDICINE

## 2019-02-15 PROCEDURE — 1036F TOBACCO NON-USER: CPT | Performed by: FAMILY MEDICINE

## 2019-02-15 PROCEDURE — 1101F PT FALLS ASSESS-DOCD LE1/YR: CPT | Performed by: FAMILY MEDICINE

## 2019-02-15 RX ORDER — CEPHALEXIN 500 MG/1
500 CAPSULE ORAL 2 TIMES DAILY
Qty: 14 CAPSULE | Refills: 0 | Status: SHIPPED | OUTPATIENT
Start: 2019-02-15 | End: 2019-05-02

## 2019-02-15 ASSESSMENT — PATIENT HEALTH QUESTIONNAIRE - PHQ9
1. LITTLE INTEREST OR PLEASURE IN DOING THINGS: 0
SUM OF ALL RESPONSES TO PHQ9 QUESTIONS 1 & 2: 0
SUM OF ALL RESPONSES TO PHQ QUESTIONS 1-9: 0
2. FEELING DOWN, DEPRESSED OR HOPELESS: 0
SUM OF ALL RESPONSES TO PHQ QUESTIONS 1-9: 0

## 2019-02-16 LAB — TOTAL SYPHILLIS IGG/IGM: NORMAL

## 2019-02-19 ENCOUNTER — HOSPITAL ENCOUNTER (OUTPATIENT)
Dept: MRI IMAGING | Age: 84
Discharge: HOME | End: 2019-02-19
Payer: MEDICARE

## 2019-02-19 DIAGNOSIS — G30.9 DEMENTIA DUE TO ALZHEIMER'S DISEASE (HCC): ICD-10-CM

## 2019-02-19 DIAGNOSIS — F02.80 DEMENTIA DUE TO ALZHEIMER'S DISEASE (HCC): ICD-10-CM

## 2019-02-19 PROCEDURE — 70551 MRI BRAIN STEM W/O DYE: CPT

## 2019-04-15 DIAGNOSIS — G30.9 DEMENTIA DUE TO ALZHEIMER'S DISEASE (HCC): ICD-10-CM

## 2019-04-15 DIAGNOSIS — F02.80 DEMENTIA DUE TO ALZHEIMER'S DISEASE (HCC): ICD-10-CM

## 2019-04-16 RX ORDER — DONEPEZIL HYDROCHLORIDE 5 MG/1
TABLET, FILM COATED ORAL
Qty: 90 TABLET | Refills: 2 | OUTPATIENT
Start: 2019-04-16

## 2019-04-16 NOTE — TELEPHONE ENCOUNTER
Refill not appropriate.  #30 + 2 was escribed on 02.12.19, and patient has f/u scheduled for 04.18.19

## 2019-04-18 ENCOUNTER — OFFICE VISIT (OUTPATIENT)
Dept: NEUROLOGY | Age: 84
End: 2019-04-18
Payer: MEDICARE

## 2019-04-18 ENCOUNTER — TELEPHONE (OUTPATIENT)
Dept: NEUROLOGY | Age: 84
End: 2019-04-18

## 2019-04-18 VITALS
HEART RATE: 61 BPM | BODY MASS INDEX: 22.66 KG/M2 | WEIGHT: 136 LBS | DIASTOLIC BLOOD PRESSURE: 75 MMHG | SYSTOLIC BLOOD PRESSURE: 126 MMHG | OXYGEN SATURATION: 95 % | HEIGHT: 65 IN

## 2019-04-18 DIAGNOSIS — F02.80 DEMENTIA DUE TO ALZHEIMER'S DISEASE (HCC): ICD-10-CM

## 2019-04-18 DIAGNOSIS — G25.0 ESSENTIAL TREMOR: Primary | ICD-10-CM

## 2019-04-18 DIAGNOSIS — I10 HTN (HYPERTENSION), BENIGN: ICD-10-CM

## 2019-04-18 DIAGNOSIS — E03.9 ACQUIRED HYPOTHYROIDISM: ICD-10-CM

## 2019-04-18 DIAGNOSIS — G30.9 DEMENTIA DUE TO ALZHEIMER'S DISEASE (HCC): ICD-10-CM

## 2019-04-18 PROCEDURE — 99214 OFFICE O/P EST MOD 30 MIN: CPT | Performed by: PSYCHIATRY & NEUROLOGY

## 2019-04-18 PROCEDURE — G8427 DOCREV CUR MEDS BY ELIG CLIN: HCPCS | Performed by: PSYCHIATRY & NEUROLOGY

## 2019-04-18 PROCEDURE — G8420 CALC BMI NORM PARAMETERS: HCPCS | Performed by: PSYCHIATRY & NEUROLOGY

## 2019-04-18 PROCEDURE — 1036F TOBACCO NON-USER: CPT | Performed by: PSYCHIATRY & NEUROLOGY

## 2019-04-18 PROCEDURE — G8399 PT W/DXA RESULTS DOCUMENT: HCPCS | Performed by: PSYCHIATRY & NEUROLOGY

## 2019-04-18 PROCEDURE — 1090F PRES/ABSN URINE INCON ASSESS: CPT | Performed by: PSYCHIATRY & NEUROLOGY

## 2019-04-18 PROCEDURE — 4040F PNEUMOC VAC/ADMIN/RCVD: CPT | Performed by: PSYCHIATRY & NEUROLOGY

## 2019-04-18 PROCEDURE — 1123F ACP DISCUSS/DSCN MKR DOCD: CPT | Performed by: PSYCHIATRY & NEUROLOGY

## 2019-04-18 RX ORDER — DONEPEZIL HYDROCHLORIDE 10 MG/1
10 TABLET, FILM COATED ORAL NIGHTLY
Qty: 90 TABLET | Refills: 1 | Status: SHIPPED | OUTPATIENT
Start: 2019-04-18 | End: 2020-01-02 | Stop reason: SDUPTHER

## 2019-04-18 RX ORDER — PROPRANOLOL HCL 60 MG
60 CAPSULE, EXTENDED RELEASE 24HR ORAL DAILY
Qty: 30 CAPSULE | Refills: 2 | Status: SHIPPED | OUTPATIENT
Start: 2019-04-18 | End: 2019-07-29

## 2019-04-18 NOTE — TELEPHONE ENCOUNTER
I ask TS to make sure that he wanted patient to start taking the Inderol LA at 60 mg and not 80 mg, which was previously mentioned, and TS said that no, he wants patient to start on the 60 mg. Daughter notified.

## 2019-04-18 NOTE — PROGRESS NOTES
The patient came today for follow up regarding: dementia and chronic tremors. Since the patient's last visit, she had MRI of the brain which showed no acute abnormalities. She had normal TSH and B12. Patient was started on Aricept 5 mg daily. She denies any side effect from Aricept. She continues to have daily episodes of episodic short-term memory loss. Degree is mild to moderate. She can forget details of recent conversation or daily activity. The patient however remains active and independent in ADL. She drives only locally. No recent MVA or getting lost in direction. She is getting assistant and help her family. She denies any frequent headaches, aphasia or dysphagia. No recent or recurrent falling or passing out. She denies any psychosis or hallucination. Family denies any long-term memory loss. Normal triggers for her memory loss include stress, anxiety or is a distraction. No history of severe insomnia or parasomnia. History of benign essential tremors. Location in the hands more right than left and occasionally in her head. She is on Inderal 20 mg tid and Mysoline 50 mg tid. No side effect from these medications. Tremors are daily and can last for minutes. Triggers including eating or holding utensils. No resting tremors. No other triggers or other associated symptoms. History of hypertension and hypothyroid. She feels lisinopril could cause worsening of her tremors. She is on aspirin daily. No other new symptoms today. Other review of system was unremarkable.           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 Authorizing Provider   propranolol (INDERAL LA) 60 MG extended release capsule Take 1 capsule by mouth daily Yes Dillan Key MD   donepezil (ARICEPT) 10 MG tablet Take 1 tablet by mouth nightly Yes Dillan Key MD   cephALEXin (KEFLEX) 500 MG capsule Take 1 capsule by mouth 2 times daily Yes Radha Carver, DO   Calcium Carb-Cholecalciferol (CALCIUM 1000 + D) 1000-800 MG-UNIT TABS Take by mouth Yes Historical Provider, MD   Bioflavonoid Products (BIOFLEX PO) Take by mouth Yes Historical Provider, MD   Chromium Picolinate (CHROMIUM PICOLINATE LOUISE) 800 MCG TABS Take by mouth Yes Historical Provider, MD   lisinopril (PRINIVIL;ZESTRIL) 10 MG tablet Take 1 tablet by mouth daily Yes Radha Carver, DO   carbamide peroxide (DEBROX) 6.5 % otic solution Place 5 drops into both ears as needed (clogged feeling) Yes Radha Carver, DO   primidone (MYSOLINE) 50 MG tablet TAKE 1 TABLET THREE TIMES DAILY Yes Radha Carver, DO   levothyroxine (SYNTHROID) 112 MCG tablet TAKE 1 TABLET EVERY DAY Yes Radha Carver, DO   desonide (DESOWEN) 0.05 % cream Apply topically 2 times daily. Yes Radha Carver, DO   triamcinolone (KENALOG) 0.025 % cream Apply topically 1 times daily for 2 weeks. Yes Radha Carver, DO   clotrimazole-betamethasone (LOTRISONE) 1-0.05 % cream Apply topically 2 times daily. Yes Radha Carver, DO   loratadine (CLEAR-ATADINE) 10 MG tablet Take 10 mg by mouth daily. Yes Historical Provider, MD   aspirin 81 MG chewable tablet Take 81 mg by mouth daily. Yes Historical Provider, MD   docusate sodium (COLACE) 100 MG capsule Take 100 mg by mouth as needed. Yes Historical Provider, MD   ibuprofen (ADVIL;MOTRIN) 200 MG CAPS Take 1 capsule by mouth as needed. Yes Historical Provider, MD   Multiple Vitamin (MULTI-VITAMIN) TABS Take 1 tablet by mouth as needed.  Yes Historical Provider, MD     Allergies   Allergen Reactions    Vicodin [Hydrocodone-Acetaminophen] Rash     Social History     Tobacco Use    Smoking status: Never Smoker    Smokeless tobacco: Never Used    Tobacco comment: Lived with a smoker for 15 year   Substance Use Topics    Alcohol use: No     Family History   Problem Relation Age of Onset    Cancer Father         bowel    Cancer Paternal Uncle         bowel    Cancer Paternal Grandmother         bowel    Asthma Neg Hx     Diabetes Neg Hx     Emphysema Neg Hx     Heart Failure Neg Hx     Hypertension Neg Hx      Past Surgical History:   Procedure Laterality Date    BRONCHOSCOPY      EYE SURGERY      eye lid spot removed    TONSILLECTOMY             Exam:   Constitutional:   Vitals:    04/18/19 1153   BP: 126/75   Pulse: 61   SpO2: 95%   Weight: 136 lb (61.7 kg)   Height: 5' 5\" (1.651 m)       General appearance:  Normal development and appear in no acute distress. Eye: No icterus. Neck: supple  Cardiovascular:   Mild lower leg edema with good pulsation. Mental Status:   Oriented to person, place, problem, and time. Memory: Poor immediate recall. Intact remote memory  Normal attention span and concentration. Language: intact naming, repeating and fluency   Good fund of Knowledge. Aware of current events and vocabulary   Cranial Nerves:   II: Visual fields: Full to confrontation and nl [facility]. Pupils: equal, round, reactive to light  III,IV,VI: Extra Ocular Movements are intact. No nystagmus  V: Facial sensation is intact   VII: Facial strength and movements: intact and symmetric  VIII: Hearing: Intact to finger rub bilaterally  IX: Palate elevation is symmetric  XI: Shoulder shrug is intact  XII: Tongue movements are normal  Musculoskeletal: 5/5 in all 4 extremities. The same postural hand tremors. No resting tremors. No cog wheeling   Tone: Normal tone. Reflexes: Bilateral biceps 2/4, triceps 2/4, brachial radialis 2/4, knee 1/4 and ankle 1/4. Planters: flexor bilaterally.   Coordination: no pronator drift, no dysmetria with FNF. Normal REM. Sensation: normal to all modalities in both arms and legs. Gait/Posture: steady gait     ROS : A 10-12 system review of constitutional, cardiovascular, respiratory, musculoskeletal, endocrine, hematological, skin, SHEENT, genitourinary, psychiatric and neurologic systems was obtained and updated today which is unremarkable except as mentioned in my HPI      Medical decision making:  I personally reviewed and updated social history, past medical history, medications, allergy, surgical history, and family history as documented in the patient's electronic health records. Labs and/or neuroimaging and other test results reviewed and discussed with the patient. Reviewed notes from other physicians. Provided patient education regarding risk, benefits and treatment options as well as adherence to medication regimen and side effect from these medications. Assessment:   Diagnosis Orders   1. Essential tremor  propranolol (INDERAL LA) 60 MG extended release capsule   2. Dementia due to Alzheimer's disease  donepezil (ARICEPT) 10 MG tablet   3. HTN (hypertension), benign     4. Acquired hypothyroidism             Plan:  Switch her to Inderal  LA 60 mg daily for better compliance. DC regular Inderal  Continue Mysoline 50 mg tid. Side effect from these medications were discussed today in detail with the patient and her family including but not limited to bradycardia, arrhythmia, fatigue, tiredness, dizziness and sedation. Continue Aricept. Increase dose to 10 mg daily  Refill for Aricept  Different behavior and cognitive therapy to help memory were discussed  Driving recautions  Discussed side effects from Aricept which could include but not limited to: Bradycardia,  arrhythmia, nightmares, insomnia, GI upset and abdominal pain. The patient was advised to check and monitor blood pressure and  heart rate closely over the next few weeks.  Patient voiced understanding.   Falling precautions  Improving sleep hygiene  Continue Synthroid  Multivitamin daily  Continue current blood pressure medication and monitor blood pressure at home  Follow up in 6 month

## 2019-04-18 NOTE — TELEPHONE ENCOUNTER
Pt's daughter Naomi Galaviz called said they discussed pt's meds today & she thought the Proprandol was 80 mg not 60 mg. So she is trying to get clarification on mg & directions. Please advise. Thank you.

## 2019-05-02 ENCOUNTER — OFFICE VISIT (OUTPATIENT)
Dept: FAMILY MEDICINE CLINIC | Age: 84
End: 2019-05-02
Payer: MEDICARE

## 2019-05-02 VITALS
DIASTOLIC BLOOD PRESSURE: 62 MMHG | HEIGHT: 65 IN | BODY MASS INDEX: 22.66 KG/M2 | WEIGHT: 136 LBS | OXYGEN SATURATION: 95 % | SYSTOLIC BLOOD PRESSURE: 124 MMHG | HEART RATE: 60 BPM

## 2019-05-02 DIAGNOSIS — H61.23 BILATERAL IMPACTED CERUMEN: Primary | ICD-10-CM

## 2019-05-02 PROCEDURE — 1090F PRES/ABSN URINE INCON ASSESS: CPT | Performed by: NURSE PRACTITIONER

## 2019-05-02 PROCEDURE — 99213 OFFICE O/P EST LOW 20 MIN: CPT | Performed by: NURSE PRACTITIONER

## 2019-05-02 PROCEDURE — G8420 CALC BMI NORM PARAMETERS: HCPCS | Performed by: NURSE PRACTITIONER

## 2019-05-02 PROCEDURE — 4040F PNEUMOC VAC/ADMIN/RCVD: CPT | Performed by: NURSE PRACTITIONER

## 2019-05-02 PROCEDURE — 1036F TOBACCO NON-USER: CPT | Performed by: NURSE PRACTITIONER

## 2019-05-02 PROCEDURE — G8399 PT W/DXA RESULTS DOCUMENT: HCPCS | Performed by: NURSE PRACTITIONER

## 2019-05-02 PROCEDURE — 1123F ACP DISCUSS/DSCN MKR DOCD: CPT | Performed by: NURSE PRACTITIONER

## 2019-05-02 PROCEDURE — G8427 DOCREV CUR MEDS BY ELIG CLIN: HCPCS | Performed by: NURSE PRACTITIONER

## 2019-05-02 ASSESSMENT — ENCOUNTER SYMPTOMS
EYE REDNESS: 0
STRIDOR: 0
TROUBLE SWALLOWING: 0
PHOTOPHOBIA: 0
CHOKING: 0
EYE DISCHARGE: 0
RECTAL PAIN: 0
COLOR CHANGE: 0
COUGH: 0
FACIAL SWELLING: 0
CHEST TIGHTNESS: 0
EYE PAIN: 0
SHORTNESS OF BREATH: 0
APNEA: 0
ABDOMINAL DISTENTION: 0
WHEEZING: 0
VOICE CHANGE: 0
ABDOMINAL PAIN: 0

## 2019-07-15 DIAGNOSIS — G25.0 TREMOR, ESSENTIAL: ICD-10-CM

## 2019-07-16 RX ORDER — PRIMIDONE 50 MG/1
TABLET ORAL
Qty: 270 TABLET | Refills: 3 | Status: SHIPPED | OUTPATIENT
Start: 2019-07-16 | End: 2020-06-11

## 2019-07-29 ENCOUNTER — OFFICE VISIT (OUTPATIENT)
Dept: FAMILY MEDICINE CLINIC | Age: 84
End: 2019-07-29
Payer: MEDICARE

## 2019-07-29 VITALS
DIASTOLIC BLOOD PRESSURE: 74 MMHG | HEART RATE: 62 BPM | WEIGHT: 136 LBS | BODY MASS INDEX: 22.66 KG/M2 | HEIGHT: 65 IN | OXYGEN SATURATION: 95 % | SYSTOLIC BLOOD PRESSURE: 126 MMHG

## 2019-07-29 DIAGNOSIS — G25.0 ESSENTIAL TREMOR: ICD-10-CM

## 2019-07-29 DIAGNOSIS — H69.81 DYSFUNCTION OF RIGHT EUSTACHIAN TUBE: Primary | ICD-10-CM

## 2019-07-29 DIAGNOSIS — I10 HTN (HYPERTENSION), BENIGN: ICD-10-CM

## 2019-07-29 DIAGNOSIS — Z91.09 ENVIRONMENTAL ALLERGIES: ICD-10-CM

## 2019-07-29 PROCEDURE — G8427 DOCREV CUR MEDS BY ELIG CLIN: HCPCS | Performed by: NURSE PRACTITIONER

## 2019-07-29 PROCEDURE — 1036F TOBACCO NON-USER: CPT | Performed by: NURSE PRACTITIONER

## 2019-07-29 PROCEDURE — 1090F PRES/ABSN URINE INCON ASSESS: CPT | Performed by: NURSE PRACTITIONER

## 2019-07-29 PROCEDURE — 99213 OFFICE O/P EST LOW 20 MIN: CPT | Performed by: NURSE PRACTITIONER

## 2019-07-29 PROCEDURE — 4040F PNEUMOC VAC/ADMIN/RCVD: CPT | Performed by: NURSE PRACTITIONER

## 2019-07-29 PROCEDURE — G8420 CALC BMI NORM PARAMETERS: HCPCS | Performed by: NURSE PRACTITIONER

## 2019-07-29 PROCEDURE — G8399 PT W/DXA RESULTS DOCUMENT: HCPCS | Performed by: NURSE PRACTITIONER

## 2019-07-29 PROCEDURE — 1123F ACP DISCUSS/DSCN MKR DOCD: CPT | Performed by: NURSE PRACTITIONER

## 2019-07-29 RX ORDER — FLUTICASONE PROPIONATE 50 MCG
2 SPRAY, SUSPENSION (ML) NASAL DAILY
Qty: 1 BOTTLE | Refills: 1 | Status: SHIPPED | OUTPATIENT
Start: 2019-07-29 | End: 2019-09-13 | Stop reason: SDUPTHER

## 2019-07-29 RX ORDER — PROPRANOLOL HYDROCHLORIDE 20 MG/1
20 TABLET ORAL 4 TIMES DAILY
COMMUNITY
End: 2019-10-22 | Stop reason: SDUPTHER

## 2019-07-29 ASSESSMENT — ENCOUNTER SYMPTOMS
SINUS PAIN: 0
SORE THROAT: 0
RHINORRHEA: 1
SHORTNESS OF BREATH: 0
FACIAL SWELLING: 0
COUGH: 0

## 2019-07-29 NOTE — PATIENT INSTRUCTIONS
Start Flonase  Try to take propranolol 4 times daily (every 4 hours while awake)   Continue to monitor BP   Goal < 140/90

## 2019-07-29 NOTE — PROGRESS NOTES
 Diabetes Neg Hx     Emphysema Neg Hx     Heart Failure Neg Hx     Hypertension Neg Hx        Review of Systems   Constitutional: Negative for chills, diaphoresis, fatigue, fever and unexpected weight change. HENT: Positive for ear pain, postnasal drip, rhinorrhea and sneezing. Negative for ear discharge, facial swelling, sinus pain, sore throat and tinnitus. Respiratory: Negative for cough and shortness of breath. Neurological: Positive for tremors. Negative for syncope, speech difficulty and weakness. Physical Exam   Constitutional: She is oriented to person, place, and time. She appears well-developed and well-nourished. No distress. Cardiovascular: Normal rate, regular rhythm and normal heart sounds. Pulmonary/Chest: Effort normal and breath sounds normal.   Neurological: She is alert and oriented to person, place, and time. Skin: She is not diaphoretic. Nursing note and vitals reviewed. Vitals:    07/29/19 1546   BP: 126/74   Site: Left Upper Arm   Position: Sitting   Pulse: 62   SpO2: 95%   Weight: 136 lb (61.7 kg)   Height: 5' 5\" (1.651 m)       Assessment:  1. Dysfunction of right eustachian tube  - fluticasone (FLONASE) 50 MCG/ACT nasal spray; 2 sprays by Each Nostril route daily  Dispense: 1 Bottle; Refill: 1    2. Environmental allergies  - fluticasone (FLONASE) 50 MCG/ACT nasal spray; 2 sprays by Each Nostril route daily  Dispense: 1 Bottle; Refill: 1    3. Essential tremor  - propranolol (INDERAL) 20 MG tablet; Take 20 mg by mouth 4 times daily    4. HTN (hypertension), benign  - propranolol (INDERAL) 20 MG tablet; Take 20 mg by mouth 4 times daily      Continue cetrizine daily for environmental allergies  Start Flonase for environmental allergies  Blood pressure is well controlled today and has also been controlled on home blood pressure monitoring. She stopped Lisinopril due to it worsening her tremor. She has been taking Propranolol 20 mg QID.  Was not able to afford

## 2019-09-10 ENCOUNTER — OFFICE VISIT (OUTPATIENT)
Dept: FAMILY MEDICINE CLINIC | Age: 84
End: 2019-09-10
Payer: MEDICARE

## 2019-09-10 VITALS
SYSTOLIC BLOOD PRESSURE: 140 MMHG | DIASTOLIC BLOOD PRESSURE: 80 MMHG | WEIGHT: 137 LBS | HEART RATE: 53 BPM | HEIGHT: 65 IN | OXYGEN SATURATION: 94 % | BODY MASS INDEX: 22.82 KG/M2

## 2019-09-10 DIAGNOSIS — L03.012 PARONYCHIA, FINGER, LEFT: Primary | ICD-10-CM

## 2019-09-10 DIAGNOSIS — I10 UNCONTROLLED HYPERTENSION: ICD-10-CM

## 2019-09-10 PROCEDURE — 99214 OFFICE O/P EST MOD 30 MIN: CPT | Performed by: FAMILY MEDICINE

## 2019-09-10 PROCEDURE — G8427 DOCREV CUR MEDS BY ELIG CLIN: HCPCS | Performed by: FAMILY MEDICINE

## 2019-09-10 PROCEDURE — 1090F PRES/ABSN URINE INCON ASSESS: CPT | Performed by: FAMILY MEDICINE

## 2019-09-10 PROCEDURE — 90653 IIV ADJUVANT VACCINE IM: CPT | Performed by: FAMILY MEDICINE

## 2019-09-10 PROCEDURE — 4040F PNEUMOC VAC/ADMIN/RCVD: CPT | Performed by: FAMILY MEDICINE

## 2019-09-10 PROCEDURE — 1036F TOBACCO NON-USER: CPT | Performed by: FAMILY MEDICINE

## 2019-09-10 PROCEDURE — 1123F ACP DISCUSS/DSCN MKR DOCD: CPT | Performed by: FAMILY MEDICINE

## 2019-09-10 PROCEDURE — G8420 CALC BMI NORM PARAMETERS: HCPCS | Performed by: FAMILY MEDICINE

## 2019-09-10 PROCEDURE — G8399 PT W/DXA RESULTS DOCUMENT: HCPCS | Performed by: FAMILY MEDICINE

## 2019-09-10 PROCEDURE — G0008 ADMIN INFLUENZA VIRUS VAC: HCPCS | Performed by: FAMILY MEDICINE

## 2019-09-10 RX ORDER — HYDROCHLOROTHIAZIDE 12.5 MG/1
12.5 CAPSULE, GELATIN COATED ORAL DAILY
Qty: 30 CAPSULE | Refills: 3 | Status: SHIPPED | OUTPATIENT
Start: 2019-09-10 | End: 2019-11-11 | Stop reason: SDUPTHER

## 2019-09-10 ASSESSMENT — ENCOUNTER SYMPTOMS
COLOR CHANGE: 1
NAUSEA: 0
EYE REDNESS: 0
DIARRHEA: 0
SHORTNESS OF BREATH: 0
CONSTIPATION: 0
VOMITING: 0

## 2019-09-10 NOTE — PROGRESS NOTES
 fluticasone (FLONASE) 50 MCG/ACT nasal spray 2 sprays by Each Nostril route daily 1 Bottle 1    propranolol (INDERAL) 20 MG tablet Take 20 mg by mouth 4 times daily      primidone (MYSOLINE) 50 MG tablet TAKE 1 TABLET THREE TIMES DAILY 270 tablet 3    donepezil (ARICEPT) 10 MG tablet Take 1 tablet by mouth nightly 90 tablet 1    Calcium Carb-Cholecalciferol (CALCIUM 1000 + D) 1000-800 MG-UNIT TABS Take by mouth      Bioflavonoid Products (BIOFLEX PO) Take by mouth      carbamide peroxide (DEBROX) 6.5 % otic solution Place 5 drops into both ears as needed (clogged feeling) 3 Bottle 0    levothyroxine (SYNTHROID) 112 MCG tablet TAKE 1 TABLET EVERY DAY 90 tablet 3    aspirin 81 MG chewable tablet Take 81 mg by mouth daily.  docusate sodium (COLACE) 100 MG capsule Take 100 mg by mouth as needed.  ibuprofen (ADVIL;MOTRIN) 200 MG CAPS Take 1 capsule by mouth as needed.  Multiple Vitamin (MULTI-VITAMIN) TABS Take 1 tablet by mouth as needed. No current facility-administered medications for this visit. Vitals:    09/10/19 0853   BP: (!) 140/80   Pulse:    SpO2:          Physical Exam  Physical Exam   Constitutional: She is oriented to person, place, and time. She appears well-developed and well-nourished. No distress. HENT:   Head: Normocephalic and atraumatic. Eyes: Conjunctivae are normal. No scleral icterus. Cardiovascular: Normal rate. Pulmonary/Chest: Effort normal. No stridor. Musculoskeletal: She exhibits no edema. Min edema at bilat feet, + mild erythema, brisk capillary refill   Neurological: She is alert and oriented to person, place, and time. Skin: Skin is warm. She is not diaphoretic. There is erythema. Edema, erythema, at paronychium, no pus or drainage    Psychiatric: She has a normal mood and affect. Her behavior is normal. Judgment and thought content normal.           Diagnosis Orders   1. Paronychia, finger, left     2.  Uncontrolled hypertension Bassam Conklin was seen today for finger pain. Diagnoses and all orders for this visit:    Paronychia, finger, left  Recommend epson salt soaks and no antibiotic  Uncontrolled hypertension  -     hydrochlorothiazide (MICROZIDE) 12.5 MG capsule; Take 1 capsule by mouth daily  Other orders  -     INFLUENZA, TRIV, INACTIVATED, SUBUNIT, ADJUVANTED, 65 YRS AND OLDER, IM, PREFILL SYR, 0.5ML (FLUAD TRIV)              An electronic signature was used to authenticate this note. This was dictated. Errors mightbe possible due to dictation.   --Babette Aase, DO

## 2019-09-10 NOTE — PROGRESS NOTES
Vaccine Information Sheet, \"Influenza - Inactivated\"  given to Alban Young, or parent/legal guardian of  Alban Young and verbalized understanding. Patient responses:    Have you ever had a reaction to a flu vaccine? No  Are you able to eat eggs without adverse effects? Yes  Do you have any current illness? No  Have you ever had Guillian Manning Syndrome? No    Flu vaccine given per order. Please see immunization tab.

## 2019-09-11 DIAGNOSIS — Z91.09 ENVIRONMENTAL ALLERGIES: ICD-10-CM

## 2019-09-11 DIAGNOSIS — H69.81 DYSFUNCTION OF RIGHT EUSTACHIAN TUBE: ICD-10-CM

## 2019-09-13 RX ORDER — FLUTICASONE PROPIONATE 50 MCG
2 SPRAY, SUSPENSION (ML) NASAL DAILY
Qty: 1 BOTTLE | Refills: 5 | Status: SHIPPED | OUTPATIENT
Start: 2019-09-13 | End: 2020-02-05

## 2019-10-22 ENCOUNTER — OFFICE VISIT (OUTPATIENT)
Dept: FAMILY MEDICINE CLINIC | Age: 84
End: 2019-10-22
Payer: MEDICARE

## 2019-10-22 VITALS
BODY MASS INDEX: 24.1 KG/M2 | HEIGHT: 63 IN | OXYGEN SATURATION: 99 % | SYSTOLIC BLOOD PRESSURE: 126 MMHG | DIASTOLIC BLOOD PRESSURE: 82 MMHG | WEIGHT: 136 LBS | HEART RATE: 50 BPM

## 2019-10-22 DIAGNOSIS — G25.0 ESSENTIAL TREMOR: ICD-10-CM

## 2019-10-22 DIAGNOSIS — I10 HTN (HYPERTENSION), BENIGN: ICD-10-CM

## 2019-10-22 DIAGNOSIS — Z00.00 ROUTINE GENERAL MEDICAL EXAMINATION AT A HEALTH CARE FACILITY: Primary | ICD-10-CM

## 2019-10-22 PROCEDURE — G8482 FLU IMMUNIZE ORDER/ADMIN: HCPCS | Performed by: FAMILY MEDICINE

## 2019-10-22 PROCEDURE — 4040F PNEUMOC VAC/ADMIN/RCVD: CPT | Performed by: FAMILY MEDICINE

## 2019-10-22 PROCEDURE — G0439 PPPS, SUBSEQ VISIT: HCPCS | Performed by: FAMILY MEDICINE

## 2019-10-22 PROCEDURE — 1123F ACP DISCUSS/DSCN MKR DOCD: CPT | Performed by: FAMILY MEDICINE

## 2019-10-22 RX ORDER — PROPRANOLOL HYDROCHLORIDE 20 MG/1
20 TABLET ORAL 4 TIMES DAILY
Qty: 30 TABLET | Refills: 5 | Status: SHIPPED | OUTPATIENT
Start: 2019-10-22 | End: 2019-10-22 | Stop reason: SDUPTHER

## 2019-10-22 RX ORDER — PROPRANOLOL HYDROCHLORIDE 20 MG/1
20 TABLET ORAL 4 TIMES DAILY
Qty: 120 TABLET | Refills: 3 | Status: SHIPPED | OUTPATIENT
Start: 2019-10-22 | End: 2019-12-26

## 2019-10-22 ASSESSMENT — PATIENT HEALTH QUESTIONNAIRE - PHQ9
SUM OF ALL RESPONSES TO PHQ QUESTIONS 1-9: 0
SUM OF ALL RESPONSES TO PHQ QUESTIONS 1-9: 0

## 2019-10-22 ASSESSMENT — LIFESTYLE VARIABLES: HOW OFTEN DO YOU HAVE A DRINK CONTAINING ALCOHOL: 0

## 2019-10-29 DIAGNOSIS — F02.80 DEMENTIA DUE TO ALZHEIMER'S DISEASE (HCC): ICD-10-CM

## 2019-10-29 DIAGNOSIS — G30.9 DEMENTIA DUE TO ALZHEIMER'S DISEASE (HCC): ICD-10-CM

## 2019-10-29 RX ORDER — DONEPEZIL HYDROCHLORIDE 10 MG/1
TABLET, FILM COATED ORAL
Qty: 90 TABLET | Refills: 0 | OUTPATIENT
Start: 2019-10-29

## 2019-10-31 RX ORDER — LEVOTHYROXINE SODIUM 112 UG/1
TABLET ORAL
Qty: 90 TABLET | Refills: 3 | Status: SHIPPED | OUTPATIENT
Start: 2019-10-31 | End: 2020-01-23 | Stop reason: SDUPTHER

## 2019-11-04 DIAGNOSIS — G30.9 DEMENTIA DUE TO ALZHEIMER'S DISEASE (HCC): ICD-10-CM

## 2019-11-04 DIAGNOSIS — F02.80 DEMENTIA DUE TO ALZHEIMER'S DISEASE (HCC): ICD-10-CM

## 2019-11-04 RX ORDER — DONEPEZIL HYDROCHLORIDE 10 MG/1
TABLET, FILM COATED ORAL
Qty: 90 TABLET | Refills: 1 | OUTPATIENT
Start: 2019-11-04

## 2019-11-11 RX ORDER — HYDROCHLOROTHIAZIDE 12.5 MG/1
CAPSULE, GELATIN COATED ORAL
Qty: 90 CAPSULE | Refills: 1 | Status: SHIPPED | OUTPATIENT
Start: 2019-11-11 | End: 2020-03-30 | Stop reason: SDUPTHER

## 2019-11-15 ENCOUNTER — TELEPHONE (OUTPATIENT)
Dept: NEUROLOGY | Age: 84
End: 2019-11-15

## 2019-11-27 DIAGNOSIS — F02.80 DEMENTIA DUE TO ALZHEIMER'S DISEASE (HCC): ICD-10-CM

## 2019-11-27 DIAGNOSIS — G30.9 DEMENTIA DUE TO ALZHEIMER'S DISEASE (HCC): ICD-10-CM

## 2019-12-02 RX ORDER — DONEPEZIL HYDROCHLORIDE 10 MG/1
TABLET, FILM COATED ORAL
Qty: 90 TABLET | Refills: 0 | OUTPATIENT
Start: 2019-12-02

## 2019-12-26 DIAGNOSIS — G25.0 ESSENTIAL TREMOR: ICD-10-CM

## 2019-12-26 DIAGNOSIS — I10 HTN (HYPERTENSION), BENIGN: ICD-10-CM

## 2019-12-26 RX ORDER — PROPRANOLOL HYDROCHLORIDE 20 MG/1
TABLET ORAL
Qty: 360 TABLET | Refills: 1 | Status: SHIPPED | OUTPATIENT
Start: 2019-12-26 | End: 2020-06-11

## 2020-01-01 ENCOUNTER — TELEPHONE (OUTPATIENT)
Dept: FAMILY MEDICINE CLINIC | Age: 85
End: 2020-01-01

## 2020-01-02 ENCOUNTER — OFFICE VISIT (OUTPATIENT)
Dept: FAMILY MEDICINE CLINIC | Age: 85
End: 2020-01-02
Payer: MEDICARE

## 2020-01-02 VITALS
SYSTOLIC BLOOD PRESSURE: 124 MMHG | WEIGHT: 136 LBS | DIASTOLIC BLOOD PRESSURE: 60 MMHG | BODY MASS INDEX: 22.66 KG/M2 | HEART RATE: 76 BPM | TEMPERATURE: 98.6 F | OXYGEN SATURATION: 94 % | HEIGHT: 65 IN

## 2020-01-02 PROCEDURE — 1036F TOBACCO NON-USER: CPT | Performed by: NURSE PRACTITIONER

## 2020-01-02 PROCEDURE — 99213 OFFICE O/P EST LOW 20 MIN: CPT | Performed by: NURSE PRACTITIONER

## 2020-01-02 PROCEDURE — G8427 DOCREV CUR MEDS BY ELIG CLIN: HCPCS | Performed by: NURSE PRACTITIONER

## 2020-01-02 PROCEDURE — G8399 PT W/DXA RESULTS DOCUMENT: HCPCS | Performed by: NURSE PRACTITIONER

## 2020-01-02 PROCEDURE — 4040F PNEUMOC VAC/ADMIN/RCVD: CPT | Performed by: NURSE PRACTITIONER

## 2020-01-02 PROCEDURE — 1123F ACP DISCUSS/DSCN MKR DOCD: CPT | Performed by: NURSE PRACTITIONER

## 2020-01-02 PROCEDURE — 1090F PRES/ABSN URINE INCON ASSESS: CPT | Performed by: NURSE PRACTITIONER

## 2020-01-02 PROCEDURE — G8420 CALC BMI NORM PARAMETERS: HCPCS | Performed by: NURSE PRACTITIONER

## 2020-01-02 PROCEDURE — G8482 FLU IMMUNIZE ORDER/ADMIN: HCPCS | Performed by: NURSE PRACTITIONER

## 2020-01-02 RX ORDER — ALBUTEROL SULFATE 90 UG/1
2 AEROSOL, METERED RESPIRATORY (INHALATION) 4 TIMES DAILY PRN
Qty: 1 INHALER | Refills: 0 | Status: SHIPPED | OUTPATIENT
Start: 2020-01-02

## 2020-01-02 RX ORDER — DONEPEZIL HYDROCHLORIDE 10 MG/1
10 TABLET, FILM COATED ORAL NIGHTLY
Qty: 90 TABLET | Refills: 1 | Status: SHIPPED | OUTPATIENT
Start: 2020-01-02 | End: 2020-05-05 | Stop reason: SDUPTHER

## 2020-01-02 RX ORDER — BENZONATATE 100 MG/1
100 CAPSULE ORAL 3 TIMES DAILY PRN
Qty: 21 CAPSULE | Refills: 0 | Status: SHIPPED | OUTPATIENT
Start: 2020-01-02 | End: 2020-01-09

## 2020-01-02 ASSESSMENT — ENCOUNTER SYMPTOMS
SINUS PRESSURE: 1
RHINORRHEA: 1
COUGH: 1
SINUS PAIN: 1
SORE THROAT: 1
DIARRHEA: 0
SHORTNESS OF BREATH: 0
CONSTIPATION: 1
NAUSEA: 0

## 2020-01-02 ASSESSMENT — PATIENT HEALTH QUESTIONNAIRE - PHQ9
SUM OF ALL RESPONSES TO PHQ9 QUESTIONS 1 & 2: 1
SUM OF ALL RESPONSES TO PHQ QUESTIONS 1-9: 1
2. FEELING DOWN, DEPRESSED OR HOPELESS: 1
SUM OF ALL RESPONSES TO PHQ QUESTIONS 1-9: 1
1. LITTLE INTEREST OR PLEASURE IN DOING THINGS: 0

## 2020-01-02 NOTE — PATIENT INSTRUCTIONS
Tessalon perles as needed for cough  Albuterol inhaler as needed for cough  Follow up Monday if no better or sooner if symptoms worsen      Patient Education        Bronchitis: Care Instructions  Your Care Instructions    Bronchitis is inflammation of the bronchial tubes, which carry air to the lungs. The tubes swell and produce mucus, or phlegm. The mucus and inflamed bronchial tubes make you cough. You may have trouble breathing. Most cases of bronchitis are caused by viruses like those that cause colds. Antibiotics usually do not help and they may be harmful. Bronchitis usually develops rapidly and lasts about 2 to 3 weeks in otherwise healthy people. Follow-up care is a key part of your treatment and safety. Be sure to make and go to all appointments, and call your doctor if you are having problems. It's also a good idea to know your test results and keep a list of the medicines you take. How can you care for yourself at home? · Take all medicines exactly as prescribed. Call your doctor if you think you are having a problem with your medicine. · Get some extra rest.  · Take an over-the-counter pain medicine, such as acetaminophen (Tylenol), ibuprofen (Advil, Motrin), or naproxen (Aleve) to reduce fever and relieve body aches. Read and follow all instructions on the label. · Do not take two or more pain medicines at the same time unless the doctor told you to. Many pain medicines have acetaminophen, which is Tylenol. Too much acetaminophen (Tylenol) can be harmful. · Take an over-the-counter cough medicine that contains dextromethorphan to help quiet a dry, hacking cough so that you can sleep. Avoid cough medicines that have more than one active ingredient. Read and follow all instructions on the label. · Breathe moist air from a humidifier, hot shower, or sink filled with hot water. The heat and moisture will thin mucus so you can cough it out. · Do not smoke. Smoking can make bronchitis worse.  If you

## 2020-01-02 NOTE — PROGRESS NOTES
Diabetes Neg Hx     Emphysema Neg Hx     Heart Failure Neg Hx     Hypertension Neg Hx        Review of Systems   Constitutional: Positive for fatigue and fever. HENT: Positive for congestion, postnasal drip, rhinorrhea, sinus pressure, sinus pain, sneezing and sore throat. Negative for dental problem, drooling, ear discharge, ear pain and tinnitus. Respiratory: Positive for cough. Negative for shortness of breath. Cardiovascular: Negative for chest pain. Gastrointestinal: Positive for constipation. Negative for diarrhea and nausea. Physical Exam  Vitals signs and nursing note reviewed. Constitutional:       General: She is not in acute distress. Appearance: Normal appearance. She is well-developed and normal weight. She is not ill-appearing, toxic-appearing or diaphoretic. HENT:      Head: Normocephalic and atraumatic. Right Ear: Tympanic membrane, ear canal and external ear normal. There is no impacted cerumen. Left Ear: Tympanic membrane, ear canal and external ear normal. There is no impacted cerumen. Nose: Rhinorrhea present. No congestion. Mouth/Throat:      Mouth: Mucous membranes are dry. Pharynx: Uvula midline. No oropharyngeal exudate or posterior oropharyngeal erythema. Eyes:      General: No scleral icterus. Right eye: No discharge. Left eye: No discharge. Conjunctiva/sclera: Conjunctivae normal.      Pupils: Pupils are equal, round, and reactive to light. Neck:      Musculoskeletal: Full passive range of motion without pain, normal range of motion and neck supple. No neck rigidity. Thyroid: No thyromegaly. Cardiovascular:      Rate and Rhythm: Normal rate and regular rhythm. Heart sounds: Normal heart sounds, S1 normal and S2 normal. No murmur. No friction rub. No gallop. Pulmonary:      Effort: Pulmonary effort is normal. No respiratory distress. Breath sounds: No stridor. Wheezing (upper lobes) present.  No rhonchi or rales. Lymphadenopathy:      Cervical: No cervical adenopathy. Skin:     General: Skin is warm and dry. Nails: There is no clubbing. Neurological:      General: No focal deficit present. Mental Status: She is alert and oriented to person, place, and time. Mental status is at baseline. Cranial Nerves: No cranial nerve deficit. Sensory: No sensory deficit. Psychiatric:         Speech: Speech normal.       Vitals:    01/02/20 1134   BP: 124/60   Site: Left Upper Arm   Position: Sitting   Pulse: 76   Temp: 98.6 °F (37 °C)   SpO2: 94%   Weight: 136 lb (61.7 kg)   Height: 5' 5\" (1.651 m)     Assessment/Plan:  1. Bronchitis  - albuterol sulfate  (90 Base) MCG/ACT inhaler; Inhale 2 puffs into the lungs 4 times daily as needed for Wheezing  Dispense: 1 Inhaler; Refill: 0  - benzonatate (TESSALON) 100 MG capsule; Take 1 capsule by mouth 3 times daily as needed for Cough  Dispense: 21 capsule; Refill: 0    2. Dementia due to Alzheimer's disease ([facility]  - donepezil (ARICEPT) 10 MG tablet; Take 1 tablet by mouth nightly  Dispense: 90 tablet; Refill: 1    Start albuterol inhaler as needed  Tessalon perles as needed for cough  If no improvement in symptoms return on Monday or sooner if symptoms worsen    Patient Instructions     Tessalon perles as needed for cough  Albuterol inhaler as needed for cough  Follow up Monday if no better or sooner if symptoms worsen      Patient Education        Bronchitis: Care Instructions  Your Care Instructions    Bronchitis is inflammation of the bronchial tubes, which carry air to the lungs. The tubes swell and produce mucus, or phlegm. The mucus and inflamed bronchial tubes make you cough. You may have trouble breathing. Most cases of bronchitis are caused by viruses like those that cause colds. Antibiotics usually do not help and they may be harmful.   Bronchitis usually develops rapidly and lasts about 2 to 3 weeks in otherwise healthy people. Follow-up care is a key part of your treatment and safety. Be sure to make and go to all appointments, and call your doctor if you are having problems. It's also a good idea to know your test results and keep a list of the medicines you take. How can you care for yourself at home? · Take all medicines exactly as prescribed. Call your doctor if you think you are having a problem with your medicine. · Get some extra rest.  · Take an over-the-counter pain medicine, such as acetaminophen (Tylenol), ibuprofen (Advil, Motrin), or naproxen (Aleve) to reduce fever and relieve body aches. Read and follow all instructions on the label. · Do not take two or more pain medicines at the same time unless the doctor told you to. Many pain medicines have acetaminophen, which is Tylenol. Too much acetaminophen (Tylenol) can be harmful. · Take an over-the-counter cough medicine that contains dextromethorphan to help quiet a dry, hacking cough so that you can sleep. Avoid cough medicines that have more than one active ingredient. Read and follow all instructions on the label. · Breathe moist air from a humidifier, hot shower, or sink filled with hot water. The heat and moisture will thin mucus so you can cough it out. · Do not smoke. Smoking can make bronchitis worse. If you need help quitting, talk to your doctor about stop-smoking programs and medicines. These can increase your chances of quitting for good. When should you call for help? Call 911 anytime you think you may need emergency care.  For example, call if:    · You have severe trouble breathing.    Call your doctor now or seek immediate medical care if:    · You have new or worse trouble breathing.     · You cough up dark brown or bloody mucus (sputum).     · You have a new or higher fever.     · You have a new rash.    Watch closely for changes in your health, and be sure to contact your doctor if:    · You cough more deeply or more often, especially if you sodium (COLACE) 100 MG capsule Take 100 mg by mouth as needed.  ibuprofen (ADVIL;MOTRIN) 200 MG CAPS Take 1 capsule by mouth as needed.  Multiple Vitamin (MULTI-VITAMIN) TABS Take 1 tablet by mouth as needed.  [DISCONTINUED] donepezil (ARICEPT) 10 MG tablet Take 1 tablet by mouth nightly 90 tablet 1     No facility-administered encounter medications on file as of 1/2/2020.       Yobany Che, CNP

## 2020-01-03 ENCOUNTER — TELEPHONE (OUTPATIENT)
Dept: FAMILY MEDICINE CLINIC | Age: 85
End: 2020-01-03

## 2020-01-03 RX ORDER — ERYTHROMYCIN 333 MG/1
333 TABLET, DELAYED RELEASE ORAL 3 TIMES DAILY
Qty: 30 TABLET | Refills: 0 | Status: SHIPPED | OUTPATIENT
Start: 2020-01-03 | End: 2020-01-13

## 2020-01-03 NOTE — TELEPHONE ENCOUNTER
Patient daughter calling to Notify doctor that she is now running a fever and is requesting a antibiotics.  Best call back number [phone]

## 2020-01-03 NOTE — TELEPHONE ENCOUNTER
Pharmacy was not able to get the 333 delayed tablet, Dre Reese changed the Rx to the 250 mg 4 times daily delayed release tab.   Pharmacy notified

## 2020-01-03 NOTE — TELEPHONE ENCOUNTER
Patient seen Hadley Mckeon yesterday but she is not available today to respond to this message. Can you please advise what you would like the patient to do?

## 2020-01-03 NOTE — TELEPHONE ENCOUNTER
Spoke to pt, ordered erythromycin at her request because she thinks she gets reactions with other antibiotics and has done well with erythromycin in the past.

## 2020-01-22 ENCOUNTER — OFFICE VISIT (OUTPATIENT)
Dept: FAMILY MEDICINE CLINIC | Age: 85
End: 2020-01-22
Payer: MEDICARE

## 2020-01-22 VITALS
BODY MASS INDEX: 21.99 KG/M2 | HEIGHT: 65 IN | WEIGHT: 132 LBS | DIASTOLIC BLOOD PRESSURE: 76 MMHG | OXYGEN SATURATION: 93 % | SYSTOLIC BLOOD PRESSURE: 124 MMHG | HEART RATE: 76 BPM

## 2020-01-22 DIAGNOSIS — E03.9 ACQUIRED HYPOTHYROIDISM: ICD-10-CM

## 2020-01-22 DIAGNOSIS — I10 HTN (HYPERTENSION), BENIGN: ICD-10-CM

## 2020-01-22 DIAGNOSIS — R40.0 SLEEPINESS: ICD-10-CM

## 2020-01-22 LAB
A/G RATIO: 1.3 (ref 1.1–2.2)
ALBUMIN SERPL-MCNC: 3.9 G/DL (ref 3.4–5)
ALP BLD-CCNC: 54 U/L (ref 40–129)
ALT SERPL-CCNC: 12 U/L (ref 10–40)
ANION GAP SERPL CALCULATED.3IONS-SCNC: 14 MMOL/L (ref 3–16)
AST SERPL-CCNC: 18 U/L (ref 15–37)
BILIRUB SERPL-MCNC: 0.3 MG/DL (ref 0–1)
BUN BLDV-MCNC: 12 MG/DL (ref 7–20)
CALCIUM SERPL-MCNC: 9.6 MG/DL (ref 8.3–10.6)
CHLORIDE BLD-SCNC: 94 MMOL/L (ref 99–110)
CO2: 28 MMOL/L (ref 21–32)
CREAT SERPL-MCNC: 0.6 MG/DL (ref 0.6–1.2)
GFR AFRICAN AMERICAN: >60
GFR NON-AFRICAN AMERICAN: >60
GLOBULIN: 2.9 G/DL
GLUCOSE BLD-MCNC: 97 MG/DL (ref 70–99)
HCT VFR BLD CALC: 35.5 % (ref 36–48)
HEMOGLOBIN: 12.1 G/DL (ref 12–16)
MCH RBC QN AUTO: 33.2 PG (ref 26–34)
MCHC RBC AUTO-ENTMCNC: 34.2 G/DL (ref 31–36)
MCV RBC AUTO: 97.2 FL (ref 80–100)
PDW BLD-RTO: 13 % (ref 12.4–15.4)
PLATELET # BLD: 296 K/UL (ref 135–450)
PMV BLD AUTO: 7.9 FL (ref 5–10.5)
POTASSIUM SERPL-SCNC: 4.3 MMOL/L (ref 3.5–5.1)
RBC # BLD: 3.65 M/UL (ref 4–5.2)
SODIUM BLD-SCNC: 136 MMOL/L (ref 136–145)
T4 FREE: 1.6 NG/DL (ref 0.9–1.8)
TOTAL PROTEIN: 6.8 G/DL (ref 6.4–8.2)
TSH REFLEX FT4: 6.04 UIU/ML (ref 0.27–4.2)
WBC # BLD: 6.5 K/UL (ref 4–11)

## 2020-01-22 PROCEDURE — G8482 FLU IMMUNIZE ORDER/ADMIN: HCPCS | Performed by: FAMILY MEDICINE

## 2020-01-22 PROCEDURE — 1123F ACP DISCUSS/DSCN MKR DOCD: CPT | Performed by: FAMILY MEDICINE

## 2020-01-22 PROCEDURE — G8399 PT W/DXA RESULTS DOCUMENT: HCPCS | Performed by: FAMILY MEDICINE

## 2020-01-22 PROCEDURE — G8427 DOCREV CUR MEDS BY ELIG CLIN: HCPCS | Performed by: FAMILY MEDICINE

## 2020-01-22 PROCEDURE — G8420 CALC BMI NORM PARAMETERS: HCPCS | Performed by: FAMILY MEDICINE

## 2020-01-22 PROCEDURE — 4040F PNEUMOC VAC/ADMIN/RCVD: CPT | Performed by: FAMILY MEDICINE

## 2020-01-22 PROCEDURE — 99214 OFFICE O/P EST MOD 30 MIN: CPT | Performed by: FAMILY MEDICINE

## 2020-01-22 PROCEDURE — 1090F PRES/ABSN URINE INCON ASSESS: CPT | Performed by: FAMILY MEDICINE

## 2020-01-22 PROCEDURE — 1036F TOBACCO NON-USER: CPT | Performed by: FAMILY MEDICINE

## 2020-01-22 ASSESSMENT — ENCOUNTER SYMPTOMS
VOMITING: 0
DIARRHEA: 0
SHORTNESS OF BREATH: 0
ABDOMINAL PAIN: 0
EYE REDNESS: 0
CONSTIPATION: 0
NAUSEA: 1

## 2020-01-22 NOTE — PROGRESS NOTES
01/22/20      Rufino Argueta        \[facility]\""  Chief Complaint   Patient presents with    Cough     still not all the way better from 1/2/20 appt    Nausea    Discuss Medications     calcium   she is on propranolol for bp and tremor, he is supposed to take it 4 times a day but she is averaging about 3 times a day. And bp is controlled. No lightheadedness or dizziness,   Complains of nausea which is new. Did take erythromycin 4 times a day for bronchitis. She is also taking a prenatal vitamin which presumably has iron in it. She is doing this to get strength in her hair and nails. And she is also taking calcium on top of the prenatal vitamin and her daughter is here with her today and she is concerned that she might have too much calcium in her system. She is also hypothyroid and due for a recheck on the lab. Her daughter is concerned about her driving because she does have dementia. The mom is alert and oriented and feels that she can drive on her own and only drives around [place]. She does have good support system with children who live in [place]. Now willing to drive her to where she needs to go. She is taking her Aricept as prescribed and tolerating it well. Her daughter is also concerned that she reported that she fell asleep at her kitchen counter and she is worried about that and as above she does have hypothyroidism so we can check that. Vitals:    01/22/20 0846 01/22/20 0859   BP: 124/76    Site: Left Upper Arm    Position: Sitting    Cuff Size: Medium Adult    Pulse: (!) 49 76   SpO2: 93%    Weight: 132 lb (59.9 kg)    Height: 5' 5\" (1.651 m)      Body mass index is 21.97 kg/m². Wt Readings from Last 3 Encounters:   01/22/20 132 lb (59.9 kg)   01/02/20 136 lb (61.7 kg)   10/22/19 136 lb (61.7 kg)     BP Readings from Last 3 Encounters:   01/22/20 124/76   01/02/20 124/60   10/22/19 126/82        She had bronchitis on January 2.   She was given albuterol and testalonPerles, the next day on file   Social History Narrative    Not on file           Review of Systems   Constitutional: Negative for unexpected weight change. HENT: Negative. Eyes: Negative for redness. Respiratory: Negative for shortness of breath. Cardiovascular: Negative for chest pain and leg swelling. Gastrointestinal: Positive for nausea. Negative for abdominal pain, constipation, diarrhea and vomiting. Her nausea symptoms are mostly reported by her daughter and Lavelle Curiel herself feels that she is not nauseous her daughter is concerned because when she brings her food for her today she says that she is not hungry but after she eats she tolerates it well. She is not having any vomiting or weight loss. except for when she was on the erythromycin and didn't eat as much   Skin: Negative for pallor. Allergic/Immunologic: Negative for immunocompromised state. Psychiatric/Behavioral: Negative for confusion. All other systems reviewed and are negative.     Current Outpatient Medications   Medication Sig Dispense Refill    albuterol sulfate  (90 Base) MCG/ACT inhaler Inhale 2 puffs into the lungs 4 times daily as needed for Wheezing 1 Inhaler 0    donepezil (ARICEPT) 10 MG tablet Take 1 tablet by mouth nightly 90 tablet 1    propranolol (INDERAL) 20 MG tablet TAKE 1 TABLET FOUR TIMES DAILY 360 tablet 1    hydrochlorothiazide (MICROZIDE) 12.5 MG capsule TAKE 1 CAPSULE EVERY DAY 90 capsule 1    levothyroxine (SYNTHROID) 112 MCG tablet TAKE 1 TABLET EVERY DAY 90 tablet 3    fluticasone (FLONASE) 50 MCG/ACT nasal spray 2 sprays by Each Nostril route daily 1 Bottle 5    primidone (MYSOLINE) 50 MG tablet TAKE 1 TABLET THREE TIMES DAILY 270 tablet 3    Calcium Carb-Cholecalciferol (CALCIUM 1000 + D) 1000-800 MG-UNIT TABS Take by mouth      Bioflavonoid Products (BIOFLEX PO) Take by mouth      carbamide peroxide (DEBROX) 6.5 % otic solution Place 5 drops into both ears as needed (clogged feeling) 3 Bottle 0    aspirin 81 MG chewable tablet Take 81 mg by mouth daily.  docusate sodium (COLACE) 100 MG capsule Take 100 mg by mouth as needed.  ibuprofen (ADVIL;MOTRIN) 200 MG CAPS Take 1 capsule by mouth as needed.  Multiple Vitamin (MULTI-VITAMIN) TABS Take 1 tablet by mouth as needed. No current facility-administered medications for this visit. Vitals:    01/22/20 0859   BP:    Pulse: 76   SpO2:          Physical Exam  Physical Exam  Vitals signs and nursing note reviewed. Constitutional:       General: She is not in acute distress. Appearance: Normal appearance. She is well-developed and normal weight. She is not ill-appearing, toxic-appearing or diaphoretic. HENT:      Head: Normocephalic and atraumatic. Eyes:      General: No scleral icterus. Right eye: No discharge. Left eye: No discharge. Conjunctiva/sclera: Conjunctivae normal.   Neck:      Musculoskeletal: Neck supple. No neck rigidity. Cardiovascular:      Rate and Rhythm: Normal rate and regular rhythm. Heart sounds: Normal heart sounds. Pulmonary:      Effort: Pulmonary effort is normal. No respiratory distress. Breath sounds: Normal breath sounds. No stridor. No wheezing or rhonchi. Musculoskeletal:      Right lower leg: No edema. Left lower leg: No edema. Skin:     General: Skin is warm and dry. Coloration: Skin is not pale. Findings: No erythema or rash. Neurological:      Mental Status: She is alert and oriented to person, place, and time. Comments: Chronic tremor, unchanged   Psychiatric:         Mood and Affect: Mood normal.         Behavior: Behavior normal.         Thought Content: Thought content normal.         Judgment: Judgment normal.             Diagnosis Orders   1. Sleepiness  TSH WITH REFLEX TO FT4   2. Acquired hypothyroidism  TSH WITH REFLEX TO FT4   3.  HTN (hypertension), benign  Comprehensive Metabolic Panel    CBC     Blaire Vidales was seen today for

## 2020-01-23 ENCOUNTER — TELEPHONE (OUTPATIENT)
Dept: FAMILY MEDICINE CLINIC | Age: 85
End: 2020-01-23

## 2020-01-23 RX ORDER — LEVOTHYROXINE SODIUM 0.12 MG/1
TABLET ORAL
Qty: 90 TABLET | Refills: 1 | Status: SHIPPED | OUTPATIENT
Start: 2020-01-23 | End: 2020-01-24 | Stop reason: SDUPTHER

## 2020-01-23 NOTE — TELEPHONE ENCOUNTER
Patient daughter Krystal Monroe called. She states patient would like Synthroid rx to [address] [facility] order and not [facility].    Also, she had questions. 1.Should patient continue to take her multivitamin? 2. She asked if by increasing synthroid from 112 to 125 mcg does it decrease thyroid levels?

## 2020-01-24 RX ORDER — LEVOTHYROXINE SODIUM 0.12 MG/1
TABLET ORAL
Qty: 90 TABLET | Refills: 1 | Status: SHIPPED | OUTPATIENT
Start: 2020-01-24 | End: 2020-06-02

## 2020-01-24 NOTE — TELEPHONE ENCOUNTER
She can continue her multivitamin unless it has iron in it, if so she can switch to a multivitamin without iron. Her increased dose of thyroid medication will increase her thyroid levels, but if the thyroid levels get too high, we will increase the dose, she can get them rechecked in 2 months.

## 2020-02-05 RX ORDER — FLUTICASONE PROPIONATE 50 MCG
SPRAY, SUSPENSION (ML) NASAL
Qty: 48 G | Refills: 5 | Status: SHIPPED | OUTPATIENT
Start: 2020-02-05 | End: 2021-07-21

## 2020-02-07 ENCOUNTER — TELEPHONE (OUTPATIENT)
Dept: FAMILY MEDICINE CLINIC | Age: 85
End: 2020-02-07

## 2020-03-24 ENCOUNTER — TELEPHONE (OUTPATIENT)
Dept: FAMILY MEDICINE CLINIC | Age: 85
End: 2020-03-24

## 2020-03-24 NOTE — TELEPHONE ENCOUNTER
Patients ears are clogged she is not having any pain. She has no My Chart and lives alone. She doesn't have anyone who could help her set it up.

## 2020-03-30 RX ORDER — HYDROCHLOROTHIAZIDE 12.5 MG/1
CAPSULE, GELATIN COATED ORAL
Qty: 90 CAPSULE | Refills: 1 | Status: SHIPPED | OUTPATIENT
Start: 2020-03-30 | End: 2020-06-18

## 2020-04-15 ENCOUNTER — TELEPHONE (OUTPATIENT)
Dept: FAMILY MEDICINE CLINIC | Age: 85
End: 2020-04-15

## 2020-04-15 DIAGNOSIS — H92.09 OTALGIA, UNSPECIFIED LATERALITY: Primary | ICD-10-CM

## 2020-04-16 RX ORDER — AZITHROMYCIN 250 MG/1
250 TABLET, FILM COATED ORAL SEE ADMIN INSTRUCTIONS
Qty: 6 TABLET | Refills: 0 | Status: SHIPPED | OUTPATIENT
Start: 2020-04-16 | End: 2020-04-21

## 2020-05-05 RX ORDER — DONEPEZIL HYDROCHLORIDE 10 MG/1
10 TABLET, FILM COATED ORAL NIGHTLY
Qty: 90 TABLET | Refills: 1 | Status: SHIPPED | OUTPATIENT
Start: 2020-05-05 | End: 2020-08-11

## 2020-06-02 RX ORDER — LEVOTHYROXINE SODIUM 0.12 MG/1
TABLET ORAL
Qty: 90 TABLET | Refills: 1 | Status: SHIPPED | OUTPATIENT
Start: 2020-06-02 | End: 2021-01-13

## 2020-06-11 RX ORDER — PRIMIDONE 50 MG/1
TABLET ORAL
Qty: 270 TABLET | Refills: 3 | Status: SHIPPED | OUTPATIENT
Start: 2020-06-11 | End: 2021-07-21

## 2020-06-11 RX ORDER — PROPRANOLOL HYDROCHLORIDE 20 MG/1
TABLET ORAL
Qty: 360 TABLET | Refills: 1 | Status: SHIPPED | OUTPATIENT
Start: 2020-06-11 | End: 2021-01-13

## 2020-06-18 RX ORDER — HYDROCHLOROTHIAZIDE 12.5 MG/1
CAPSULE, GELATIN COATED ORAL
Qty: 90 CAPSULE | Refills: 1 | Status: SHIPPED | OUTPATIENT
Start: 2020-06-18 | End: 2021-01-13

## 2020-08-24 ENCOUNTER — NURSE TRIAGE (OUTPATIENT)
Dept: OTHER | Facility: CLINIC | Age: 85
End: 2020-08-24

## 2020-08-24 NOTE — TELEPHONE ENCOUNTER
Reason for Disposition   MODERATE-SEVERE eyelid swelling on one side  (Exception: due to a mosquito bite)    Protocols used: EYE - [facility]    Received call from [facility]. Caller is reporting she woke with R eye swelling this AM. Caller denies pain or redness. Caller has full facial movement. Provided care advice to help with symptoms. Call soft transferred to [address] to schedule appointment. Please do not reply to the triage nurse through this encounter. Any subsequent communication should be directly with the patient.

## 2020-08-25 ENCOUNTER — OFFICE VISIT (OUTPATIENT)
Dept: FAMILY MEDICINE CLINIC | Age: 85
End: 2020-08-25
Payer: MEDICARE

## 2020-08-25 VITALS
OXYGEN SATURATION: 94 % | WEIGHT: 168 LBS | BODY MASS INDEX: 27.99 KG/M2 | DIASTOLIC BLOOD PRESSURE: 66 MMHG | SYSTOLIC BLOOD PRESSURE: 128 MMHG | HEIGHT: 65 IN | TEMPERATURE: 97.7 F | HEART RATE: 62 BPM

## 2020-08-25 PROCEDURE — 4040F PNEUMOC VAC/ADMIN/RCVD: CPT | Performed by: NURSE PRACTITIONER

## 2020-08-25 PROCEDURE — G8427 DOCREV CUR MEDS BY ELIG CLIN: HCPCS | Performed by: NURSE PRACTITIONER

## 2020-08-25 PROCEDURE — 99213 OFFICE O/P EST LOW 20 MIN: CPT | Performed by: NURSE PRACTITIONER

## 2020-08-25 PROCEDURE — 1123F ACP DISCUSS/DSCN MKR DOCD: CPT | Performed by: NURSE PRACTITIONER

## 2020-08-25 PROCEDURE — G8417 CALC BMI ABV UP PARAM F/U: HCPCS | Performed by: NURSE PRACTITIONER

## 2020-08-25 PROCEDURE — G8399 PT W/DXA RESULTS DOCUMENT: HCPCS | Performed by: NURSE PRACTITIONER

## 2020-08-25 PROCEDURE — 1036F TOBACCO NON-USER: CPT | Performed by: NURSE PRACTITIONER

## 2020-08-25 PROCEDURE — 1090F PRES/ABSN URINE INCON ASSESS: CPT | Performed by: NURSE PRACTITIONER

## 2020-08-25 ASSESSMENT — ENCOUNTER SYMPTOMS
SHORTNESS OF BREATH: 0
COUGH: 0
PHOTOPHOBIA: 0
DIARRHEA: 0
NAUSEA: 0
EYE ITCHING: 0
VOMITING: 0
EYE REDNESS: 0
EYE PAIN: 0

## 2020-08-25 NOTE — PROGRESS NOTES
8/25/20    Chief Complaint   Patient presents with    Eye Problem     right eye swelling/ started yesterday        HPI: Karen Perkins is a 81 y.o. female who presents with complaints of swelling of right upper eyelid, started yesterday after doing yard work. Denies eye pain, eye discharge, vision changes, uri symptoms, fever. Has not tried any treatment at home. Eye Problem    The right eye is affected. This is a new problem. The current episode started yesterday. The problem occurs constantly. The problem has been gradually worsening. The injury mechanism is unknown. The patient is experiencing no pain. There is no known exposure to pink eye. She does not wear contacts. Pertinent negatives include no eye redness, fever, itching, nausea, photophobia or vomiting. She has tried nothing for the symptoms.        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    Pneumonia 2012    Pulmonary nodule     Tremor, essential        Past Surgical History:   Procedure Laterality Date    BRONCHOSCOPY      EYE SURGERY      eye lid spot removed    TONSILLECTOMY         Social History     Tobacco Use    Smoking status: Never Smoker    Smokeless tobacco: Never Used    Tobacco comment: Lived with a smoker for 15 year   Substance Use Topics    Alcohol use: No    Drug use: No       Family History   Problem Relation Age of Onset    Cancer Father         bowel    Cancer Paternal Uncle         bowel    Cancer Paternal Grandmother         bowel    Asthma Neg Hx     Diabetes Neg Hx     Emphysema Neg Hx     Heart Failure Neg Hx     Hypertension Neg Hx        Review of Systems   Constitutional: Negative for chills, fatigue and fever. Eyes: Negative for photophobia, pain, redness and itching. Swelling right upper eyelid   Respiratory: Negative for cough and shortness of breath. Cardiovascular: Negative for chest pain and leg swelling. Gastrointestinal: Negative for diarrhea, nausea and vomiting. Neurological: Negative for dizziness and headaches. All other systems reviewed and are negative. Physical Exam  Vitals signs and nursing note reviewed. Constitutional:       General: She is not in acute distress. Appearance: Normal appearance. She is normal weight. She is not ill-appearing, toxic-appearing or diaphoretic. HENT:      Head: Normocephalic and atraumatic. Nose: Nose normal.   Eyes:      General: No allergic shiner or scleral icterus. Right eye: No foreign body or discharge. Left eye: No foreign body or discharge. Extraocular Movements: Extraocular movements intact. Right eye: Normal extraocular motion and no nystagmus. Left eye: Normal extraocular motion and no nystagmus. Conjunctiva/sclera: Conjunctivae normal.      Right eye: Right conjunctiva is not injected. No chemosis, exudate or hemorrhage. Left eye: Left conjunctiva is not injected. No chemosis, exudate or hemorrhage. Pupils: Pupils are equal, round, and reactive to light. Comments: Right upper eyelid puffiness, no erythema, hordeolum. EOM intact. Sclera appears normal. No drainage noted. Cardiovascular:      Heart sounds: Normal heart sounds. No murmur. No friction rub. No gallop. Pulmonary:      Effort: Pulmonary effort is normal. No respiratory distress. Breath sounds: Normal breath sounds. No stridor. No wheezing or rales. Neurological:      General: No focal deficit present. Mental Status: She is alert and oriented to person, place, and time. Cranial Nerves: No cranial nerve deficit.          Vitals:    08/25/20 1130   BP: 128/66   Site: Left Upper Arm Position: Sitting   Cuff Size: Medium Adult   Pulse: 62   Temp: 97.7 °F (36.5 °C)   SpO2: 94%   Weight: 168 lb (76.2 kg)   Height: 5' 5\" (1.651 m)       Assessment/Plan:  1. Eyelid gland swelling, right    Cool compresses as discussed  Call tomorrow with progress report    Patient Instructions   - cool compresses once per hour while awake  - call tomorrow with update       Outpatient Encounter Medications as of 8/25/2020   Medication Sig Dispense Refill    donepezil (ARICEPT) 10 MG tablet TAKE 1 TABLET EVERY NIGHT 90 tablet 3    hydroCHLOROthiazide (MICROZIDE) 12.5 MG capsule TAKE 1 CAPSULE EVERY DAY 90 capsule 1    propranolol (INDERAL) 20 MG tablet TAKE 1 TABLET FOUR TIMES DAILY 360 tablet 1    primidone (MYSOLINE) 50 MG tablet TAKE 1 TABLET THREE TIMES DAILY 270 tablet 3    levothyroxine (SYNTHROID) 125 MCG tablet TAKE 1 TABLET EVERY DAY 90 tablet 1    carbamide peroxide (DEBROX) 6.5 % otic solution Place 5 drops into both ears as needed (clogged feeling) 1 Bottle 0    fluticasone (FLONASE) 50 MCG/ACT nasal spray USE 2 SPRAYS IN EACH NOSTRIL EVERY DAY 48 g 5    albuterol sulfate  (90 Base) MCG/ACT inhaler Inhale 2 puffs into the lungs 4 times daily as needed for Wheezing 1 Inhaler 0    Calcium Carb-Cholecalciferol (CALCIUM 1000 + D) 1000-800 MG-UNIT TABS Take by mouth      Bioflavonoid Products (BIOFLEX PO) Take by mouth      aspirin 81 MG chewable tablet Take 81 mg by mouth daily.  docusate sodium (COLACE) 100 MG capsule Take 100 mg by mouth as needed.  ibuprofen (ADVIL;MOTRIN) 200 MG CAPS Take 1 capsule by mouth as needed.  Multiple Vitamin (MULTI-VITAMIN) TABS Take 1 tablet by mouth as needed. No facility-administered encounter medications on file as of 8/25/2020.           Jp Berrios CNP

## 2020-08-26 ENCOUNTER — TELEPHONE (OUTPATIENT)
Dept: FAMILY MEDICINE CLINIC | Age: 85
End: 2020-08-26

## 2020-08-26 RX ORDER — ERYTHROMYCIN 5 MG/G
OINTMENT OPHTHALMIC ONCE
Qty: 1 G | Refills: 0 | Status: SHIPPED | OUTPATIENT
Start: 2020-08-26 | End: 2020-08-26

## 2020-08-26 NOTE — TELEPHONE ENCOUNTER
Spoke with patient, still having redness and puffiness to the eyelid. Symptoms are not worsen. No eye pain, vision changes or discharge. Will try erythromycin ointment and have her call Monday with progress report or sooner if symptoms worsen.

## 2020-08-26 NOTE — TELEPHONE ENCOUNTER
----- Message from Eric Gonzales sent at 8/26/2020 11:00 AM EDT -----  Subject: Message to Provider    QUESTIONS  Information for Provider? Patient states her eye is worse since yesterday. Would like to know what are her next best steps.  ---------------------------------------------------------------------------  --------------  CALL BACK INFO  What is the best way for the office to contact you? OK to leave message on   voicemail  Preferred Call Back Phone Number? [phone]  ---------------------------------------------------------------------------  --------------  SCRIPT ANSWERS  Relationship to Patient?  Self

## 2020-08-27 ENCOUNTER — TELEPHONE (OUTPATIENT)
Dept: FAMILY MEDICINE CLINIC | Age: 85
End: 2020-08-27

## 2020-08-27 ENCOUNTER — NURSE TRIAGE (OUTPATIENT)
Dept: OTHER | Facility: CLINIC | Age: 85
End: 2020-08-27

## 2020-08-27 NOTE — TELEPHONE ENCOUNTER
Spoke to patient / she talked with [facility] and sent pictures she was informed to go to [facility] they will she will need IV antibiotics that she may have orbital Cellulitis.

## 2020-08-27 NOTE — TELEPHONE ENCOUNTER
The pt is stating that her eye is worse and that she can not open it at all. No pain  And cant tell if there is any redness or not. Not sure what step you want her to take.

## 2020-08-27 NOTE — TELEPHONE ENCOUNTER
Received call from [facility], [address]. Patient called in c/o right eye being shut, swollen eye lid, whites of eye are red, onset 8/24/20, patient states her symptoms are not getting better and the medication given on 8/26, does not seem to be helping. She also stated she is having a difficult time with the medications due to tremors and she may not be getting all of the medication in her eye. Duplicate call. Call soft transferred to [facility] to schedule appointment. Please do not reply to the triage nurse through this encounter. Any subsequent communication should be directly with the patient. Reason for Disposition  Ayala Flores Caller has already spoken with another triager or PCP (or office), and has further questions and triager able to answer questions.     Protocols used: NO CONTACT OR DUPLICATE CONTACT CALL-ADULT-[place]

## 2020-09-05 RX ORDER — FLUCONAZOLE 150 MG/1
150 TABLET ORAL ONCE
Qty: 1 TABLET | Refills: 0 | Status: SHIPPED | OUTPATIENT
Start: 2020-09-05 | End: 2020-09-11 | Stop reason: SDUPTHER

## 2020-09-10 ENCOUNTER — TELEPHONE (OUTPATIENT)
Dept: FAMILY MEDICINE CLINIC | Age: 85
End: 2020-09-10

## 2020-09-10 NOTE — TELEPHONE ENCOUNTER
----- Message from Greta Fraser sent at 9/10/2020  3:14 PM EDT -----  Subject: Message to Provider    QUESTIONS  Information for Provider? Patient is still having persistent yeast   infection and wants to know if she can have something else called in for   it or if there is anything she can do for the symptoms to reside   please advise   ---------------------------------------------------------------------------  --------------  CALL BACK INFO  What is the best way for the office to contact you? OK to leave message on   voicemail  Preferred Call Back Phone Number? [phone]  ---------------------------------------------------------------------------  --------------  SCRIPT ANSWERS  Relationship to Patient?  Self

## 2020-09-11 RX ORDER — FLUCONAZOLE 150 MG/1
150 TABLET ORAL ONCE
Qty: 1 TABLET | Refills: 0 | Status: SHIPPED | OUTPATIENT
Start: 2020-09-11 | End: 2020-09-11

## 2020-10-08 ENCOUNTER — HOSPITAL ENCOUNTER (OUTPATIENT)
Dept: MRI IMAGING | Age: 85
Discharge: HOME | End: 2020-10-08
Payer: MEDICARE

## 2020-10-08 ENCOUNTER — HOSPITAL ENCOUNTER (OUTPATIENT)
Age: 85
Discharge: HOME | End: 2020-10-08
Payer: MEDICARE

## 2020-10-08 LAB
CREAT SERPL-MCNC: 0.7 MG/DL (ref 0.6–1.2)
GFR AFRICAN AMERICAN: >60
GFR NON-AFRICAN AMERICAN: >60
TSH REFLEX FT4: 3.16 UIU/ML (ref 0.27–4.2)

## 2020-10-08 PROCEDURE — 70543 MRI ORBT/FAC/NCK W/O &W/DYE: CPT

## 2020-10-08 PROCEDURE — 6360000004 HC RX CONTRAST MEDICATION: Performed by: OPHTHALMOLOGY

## 2020-10-08 PROCEDURE — 82565 ASSAY OF CREATININE: CPT

## 2020-10-08 PROCEDURE — A9579 GAD-BASE MR CONTRAST NOS,1ML: HCPCS | Performed by: OPHTHALMOLOGY

## 2020-10-08 PROCEDURE — 84443 ASSAY THYROID STIM HORMONE: CPT

## 2020-10-08 PROCEDURE — 36415 COLL VENOUS BLD VENIPUNCTURE: CPT

## 2020-10-08 RX ADMIN — GADOTERIDOL 15 ML: 279.3 INJECTION, SOLUTION INTRAVENOUS at 17:23

## 2020-10-27 RX ORDER — PANTOPRAZOLE SODIUM 40 MG/1
TABLET, DELAYED RELEASE ORAL
Qty: 30 TABLET | Refills: 11 | Status: SHIPPED | OUTPATIENT
Start: 2020-10-27 | End: 2021-10-18

## 2020-10-27 RX ORDER — PREDNISONE 10 MG/1
TABLET ORAL
COMMUNITY
Start: 2020-09-03 | End: 2021-07-21

## 2020-10-27 RX ORDER — PREDNISONE 1 MG/1
TABLET ORAL
COMMUNITY
Start: 2020-09-02 | End: 2021-07-21

## 2020-10-27 RX ORDER — PREDNISONE 20 MG/1
TABLET ORAL
COMMUNITY
Start: 2020-09-02 | End: 2021-07-21

## 2020-10-27 RX ORDER — ERYTHROMYCIN 5 MG/G
OINTMENT OPHTHALMIC
COMMUNITY
Start: 2020-08-26 | End: 2022-08-26

## 2020-10-27 RX ORDER — DOXYCYCLINE 100 MG/1
CAPSULE ORAL
COMMUNITY
Start: 2020-09-02

## 2020-10-27 RX ORDER — CEFDINIR 300 MG/1
CAPSULE ORAL
COMMUNITY
Start: 2020-09-02 | End: 2022-08-26

## 2020-10-27 RX ORDER — PANTOPRAZOLE SODIUM 40 MG/1
TABLET, DELAYED RELEASE ORAL
COMMUNITY
Start: 2020-09-28 | End: 2020-10-27 | Stop reason: SDUPTHER

## 2020-10-27 RX ORDER — PREDNISONE 1 MG/1
TABLET ORAL
Qty: 30 TABLET | Refills: 0 | Status: CANCELLED | OUTPATIENT
Start: 2020-10-27

## 2021-01-12 DIAGNOSIS — I10 HTN (HYPERTENSION), BENIGN: ICD-10-CM

## 2021-01-12 DIAGNOSIS — G25.0 ESSENTIAL TREMOR: ICD-10-CM

## 2021-01-13 RX ORDER — LEVOTHYROXINE SODIUM 0.12 MG/1
TABLET ORAL
Qty: 90 TABLET | Refills: 1 | Status: SHIPPED | OUTPATIENT
Start: 2021-01-13 | End: 2021-07-21

## 2021-01-13 RX ORDER — HYDROCHLOROTHIAZIDE 12.5 MG/1
CAPSULE, GELATIN COATED ORAL
Qty: 90 CAPSULE | Refills: 1 | Status: SHIPPED | OUTPATIENT
Start: 2021-01-13 | End: 2021-07-21

## 2021-01-13 RX ORDER — PROPRANOLOL HYDROCHLORIDE 20 MG/1
TABLET ORAL
Qty: 360 TABLET | Refills: 1 | Status: SHIPPED | OUTPATIENT
Start: 2021-01-13 | End: 2021-07-21

## 2021-07-01 ENCOUNTER — TELEPHONE (OUTPATIENT)
Dept: FAMILY MEDICINE CLINIC | Age: 86
End: 2021-07-01

## 2021-07-01 DIAGNOSIS — R41.3 MEMORY IMPAIRMENT: Primary | ICD-10-CM

## 2021-07-01 DIAGNOSIS — G25.0 TREMOR, ESSENTIAL: ICD-10-CM

## 2021-07-01 NOTE — TELEPHONE ENCOUNTER
----- Message from Ekaterina Miles sent at 7/1/2021 12:42 PM EDT -----  Subject: Message to Provider    QUESTIONS  Information for Provider? Patient's daughter, Mayte Bravo, called and is   wanting Dr. Shayy Brown do prescribe a driving evaluation for the patient. Patient shows early signs of dementia and when last in, Dr. Shayy Brown stated   that the patient should probably not drive. Patient's children are worried   about her safety and would like for patient to have a prescribed driving   evaluation to ensure she is safe. If able, please Emmanuel Rajan would be   evaluating through [employer]. Fax number? [phone].  ---------------------------------------------------------------------------  --------------  Vijay Slot INFO  What is the best way for the office to contact you? OK to leave message on   voicemail  Preferred Call Back Phone Number? [phone]  ---------------------------------------------------------------------------  --------------  SCRIPT ANSWERS  Relationship to Patient?  Self

## 2021-07-20 DIAGNOSIS — Z91.09 ENVIRONMENTAL ALLERGIES: ICD-10-CM

## 2021-07-20 DIAGNOSIS — G25.0 ESSENTIAL TREMOR: ICD-10-CM

## 2021-07-20 DIAGNOSIS — I10 HTN (HYPERTENSION), BENIGN: ICD-10-CM

## 2021-07-20 DIAGNOSIS — G25.0 TREMOR, ESSENTIAL: ICD-10-CM

## 2021-07-20 DIAGNOSIS — H69.81 DYSFUNCTION OF RIGHT EUSTACHIAN TUBE: ICD-10-CM

## 2021-07-21 RX ORDER — LEVOTHYROXINE SODIUM 0.12 MG/1
TABLET ORAL
Qty: 90 TABLET | Refills: 1 | Status: SHIPPED | OUTPATIENT
Start: 2021-07-21 | End: 2022-01-27

## 2021-07-21 RX ORDER — HYDROCHLOROTHIAZIDE 12.5 MG/1
CAPSULE, GELATIN COATED ORAL
Qty: 90 CAPSULE | Refills: 1 | Status: SHIPPED | OUTPATIENT
Start: 2021-07-21 | End: 2022-01-27

## 2021-07-21 RX ORDER — FLUTICASONE PROPIONATE 50 MCG
SPRAY, SUSPENSION (ML) NASAL
Qty: 48 G | Refills: 5 | Status: SHIPPED | OUTPATIENT
Start: 2021-07-21

## 2021-07-21 RX ORDER — PRIMIDONE 50 MG/1
TABLET ORAL
Qty: 270 TABLET | Refills: 3 | Status: SHIPPED | OUTPATIENT
Start: 2021-07-21

## 2021-07-21 RX ORDER — PROPRANOLOL HYDROCHLORIDE 20 MG/1
TABLET ORAL
Qty: 360 TABLET | Refills: 1 | Status: SHIPPED | OUTPATIENT
Start: 2021-07-21 | End: 2022-01-27

## 2021-07-22 ENCOUNTER — NURSE TRIAGE (OUTPATIENT)
Dept: OTHER | Facility: CLINIC | Age: 86
End: 2021-07-22

## 2021-08-27 ENCOUNTER — OFFICE VISIT (OUTPATIENT)
Dept: FAMILY MEDICINE CLINIC | Age: 86
End: 2021-08-27
Payer: MEDICARE

## 2021-08-27 VITALS
HEIGHT: 65 IN | RESPIRATION RATE: 16 BRPM | SYSTOLIC BLOOD PRESSURE: 138 MMHG | OXYGEN SATURATION: 98 % | BODY MASS INDEX: 24.43 KG/M2 | WEIGHT: 146.6 LBS | DIASTOLIC BLOOD PRESSURE: 80 MMHG | HEART RATE: 56 BPM | TEMPERATURE: 97.3 F

## 2021-08-27 DIAGNOSIS — F02.80 DEMENTIA ASSOCIATED WITH OTHER UNDERLYING DISEASE WITHOUT BEHAVIORAL DISTURBANCE (HCC): Primary | ICD-10-CM

## 2021-08-27 PROCEDURE — G8399 PT W/DXA RESULTS DOCUMENT: HCPCS | Performed by: NURSE PRACTITIONER

## 2021-08-27 PROCEDURE — 4040F PNEUMOC VAC/ADMIN/RCVD: CPT | Performed by: NURSE PRACTITIONER

## 2021-08-27 PROCEDURE — 1090F PRES/ABSN URINE INCON ASSESS: CPT | Performed by: NURSE PRACTITIONER

## 2021-08-27 PROCEDURE — G8427 DOCREV CUR MEDS BY ELIG CLIN: HCPCS | Performed by: NURSE PRACTITIONER

## 2021-08-27 PROCEDURE — 99213 OFFICE O/P EST LOW 20 MIN: CPT | Performed by: NURSE PRACTITIONER

## 2021-08-27 PROCEDURE — 1123F ACP DISCUSS/DSCN MKR DOCD: CPT | Performed by: NURSE PRACTITIONER

## 2021-08-27 RX ORDER — FLUTICASONE PROPIONATE 50 MCG
2 SPRAY, SUSPENSION (ML) NASAL DAILY
Qty: 1 BOTTLE | Refills: 0 | Status: SHIPPED | OUTPATIENT
Start: 2021-08-27 | End: 2022-08-26

## 2021-08-27 SDOH — ECONOMIC STABILITY: FOOD INSECURITY: WITHIN THE PAST 12 MONTHS, THE FOOD YOU BOUGHT JUST DIDN'T LAST AND YOU DIDN'T HAVE MONEY TO GET MORE.: NEVER TRUE

## 2021-08-27 SDOH — ECONOMIC STABILITY: FOOD INSECURITY: WITHIN THE PAST 12 MONTHS, YOU WORRIED THAT YOUR FOOD WOULD RUN OUT BEFORE YOU GOT MONEY TO BUY MORE.: NEVER TRUE

## 2021-08-27 ASSESSMENT — PATIENT HEALTH QUESTIONNAIRE - PHQ9
SUM OF ALL RESPONSES TO PHQ QUESTIONS 1-9: 0
1. LITTLE INTEREST OR PLEASURE IN DOING THINGS: 0
SUM OF ALL RESPONSES TO PHQ QUESTIONS 1-9: 0
2. FEELING DOWN, DEPRESSED OR HOPELESS: 0
SUM OF ALL RESPONSES TO PHQ QUESTIONS 1-9: 0
SUM OF ALL RESPONSES TO PHQ9 QUESTIONS 1 & 2: 0

## 2021-08-27 ASSESSMENT — SOCIAL DETERMINANTS OF HEALTH (SDOH): HOW HARD IS IT FOR YOU TO PAY FOR THE VERY BASICS LIKE FOOD, HOUSING, MEDICAL CARE, AND HEATING?: NOT HARD AT ALL

## 2021-08-27 NOTE — PROGRESS NOTES
in the Last Year:    Transportation Needs:     Lack of Transportation (Medical):  Lack of Transportation (Non-Medical):    Physical Activity:     Days of Exercise per Week:     Minutes of Exercise per Session:    Stress:     Feeling of Stress :    Social Connections:     Frequency of Communication with Friends and Family:     Frequency of Social Gatherings with Friends and Family:     Attends Mosque Services:     Active Member of Clubs or Organizations:     Attends Club or Organization Meetings:     Marital Status:    Intimate Partner Violence:     Fear of Current or Ex-Partner:     Emotionally Abused:     Physically Abused:     Sexually Abused:        Current Outpatient Medications on File Prior to Visit   Medication Sig Dispense Refill    fluticasone (FLONASE) 50 MCG/ACT nasal spray USE 2 SPRAYS IN EACH NOSTRIL EVERY DAY 48 g 5    primidone (MYSOLINE) 50 MG tablet TAKE 1 TABLET THREE TIMES DAILY 270 tablet 3    propranolol (INDERAL) 20 MG tablet TAKE 1 TABLET FOUR TIMES DAILY 360 tablet 1    levothyroxine (SYNTHROID) 125 MCG tablet TAKE 1 TABLET EVERY DAY 90 tablet 1    hydroCHLOROthiazide (MICROZIDE) 12.5 MG capsule TAKE 1 CAPSULE EVERY DAY 90 capsule 1    fluticasone (VERAMYST) 27.5 MCG/SPRAY nasal spray 2 sprays by Nasal route daily      erythromycin (ROMYCIN) 5 MG/GM ophthalmic ointment APPLY ONE RIBBON IN RIGHT EYE 4 TIMES A DAY FOR 10 DAYS      doxycycline monohydrate (MONODOX) 100 MG capsule TAKE 1 CAPSULE BY MOUTH TWICE A DAY FOR 14 DAYS      cefdinir (OMNICEF) 300 MG capsule TAKE 1 CAPSULE (300 MG TOTAL) BY MOUTH EVERY 12 HOURS FOR 14 DAYS.       pantoprazole (PROTONIX) 40 MG tablet TAKE 1 TABLET BY MOUTH EVERY DAY IN THE MORNING BEFORE BREAKFAST 30 tablet 11    donepezil (ARICEPT) 10 MG tablet TAKE 1 TABLET EVERY NIGHT 90 tablet 3    carbamide peroxide (DEBROX) 6.5 % otic solution Place 5 drops into both ears as needed (clogged feeling) 1 Bottle 0    albuterol sulfate HFA 108 (90 Base) MCG/ACT inhaler Inhale 2 puffs into the lungs 4 times daily as needed for Wheezing 1 Inhaler 0    Calcium Carb-Cholecalciferol (CALCIUM 1000 + D) 1000-800 MG-UNIT TABS Take by mouth      Bioflavonoid Products (BIOFLEX PO) Take by mouth      aspirin 81 MG chewable tablet Take 81 mg by mouth daily.  docusate sodium (COLACE) 100 MG capsule Take 100 mg by mouth as needed.  ibuprofen (ADVIL;MOTRIN) 200 MG CAPS Take 1 capsule by mouth as needed.  Multiple Vitamin (MULTI-VITAMIN) TABS Take 1 tablet by mouth as needed. No current facility-administered medications on file prior to visit. Review of Systems   Respiratory: Negative for shortness of breath and wheezing. Uses Flonase and albuterol for mild shortness of breath   Cardiovascular: Negative for chest pain, palpitations and leg swelling. Hypertension stable 138/80 with Inderal and hydrochlorothiazide   Gastrointestinal:        GERD-PPI   Neurological: Positive for seizures (Mysoline). Psychiatric/Behavioral:        Dementia-Aricept       Objective:     Physical Exam  Constitutional:       Appearance: Normal appearance. She is normal weight. HENT:      Head: Normocephalic and atraumatic. Right Ear: Tympanic membrane, ear canal and external ear normal. There is no impacted cerumen. Left Ear: Tympanic membrane, ear canal and external ear normal. There is no impacted cerumen. Nose: Nose normal. No congestion or rhinorrhea. Mouth/Throat:      Mouth: Mucous membranes are moist.      Pharynx: Oropharynx is clear. No oropharyngeal exudate or posterior oropharyngeal erythema. Eyes:      General: No scleral icterus. Conjunctiva/sclera: Conjunctivae normal.      Pupils: Pupils are equal, round, and reactive to light. Neck:      Vascular: No carotid bruit. Cardiovascular:      Rate and Rhythm: Normal rate and regular rhythm. Pulses: Normal pulses.       Heart sounds: Normal heart sounds. No murmur heard. No friction rub. No gallop. Pulmonary:      Effort: Pulmonary effort is normal.      Breath sounds: Normal breath sounds. No wheezing, rhonchi or rales. Chest:      Chest wall: No tenderness. Musculoskeletal:         General: Normal range of motion. Cervical back: Normal range of motion and neck supple. No rigidity. Skin:     General: Skin is warm and dry. Capillary Refill: Capillary refill takes 2 to 3 seconds. Neurological:      Mental Status: She is alert and oriented to person, place, and time. Psychiatric:         Mood and Affect: Mood normal.         Behavior: Behavior normal.      Comments: Dementia but pleasant         Assessment:     1. Dementia associated with other underlying disease without behavioral disturbance ([facility] [address]  Have 2 people verify there is nothing in patient's ears. Patient understands is agreeable very pleasant. Granddaughter came into her room and we discussed everything with her. Family is very supportive.  Continue with Aricept      Plan:     As above

## 2021-08-30 ASSESSMENT — ENCOUNTER SYMPTOMS
SHORTNESS OF BREATH: 0
WHEEZING: 0

## 2021-10-05 ENCOUNTER — TELEPHONE (OUTPATIENT)
Dept: FAMILY MEDICINE CLINIC | Age: 86
End: 2021-10-05

## 2021-10-08 ENCOUNTER — TELEPHONE (OUTPATIENT)
Dept: FAMILY MEDICINE CLINIC | Age: 86
End: 2021-10-08

## 2022-01-26 DIAGNOSIS — I10 HTN (HYPERTENSION), BENIGN: ICD-10-CM

## 2022-01-26 DIAGNOSIS — G25.0 ESSENTIAL TREMOR: ICD-10-CM

## 2022-01-27 RX ORDER — LEVOTHYROXINE SODIUM 0.12 MG/1
TABLET ORAL
Qty: 90 TABLET | Refills: 1 | Status: SHIPPED | OUTPATIENT
Start: 2022-01-27 | End: 2022-07-27 | Stop reason: SDUPTHER

## 2022-01-27 RX ORDER — PANTOPRAZOLE SODIUM 40 MG/1
TABLET, DELAYED RELEASE ORAL
Qty: 90 TABLET | Refills: 3 | Status: SHIPPED | OUTPATIENT
Start: 2022-01-27 | End: 2022-07-27 | Stop reason: SDUPTHER

## 2022-01-27 RX ORDER — HYDROCHLOROTHIAZIDE 12.5 MG/1
CAPSULE, GELATIN COATED ORAL
Qty: 90 CAPSULE | Refills: 1 | Status: SHIPPED | OUTPATIENT
Start: 2022-01-27

## 2022-01-27 RX ORDER — PROPRANOLOL HYDROCHLORIDE 20 MG/1
TABLET ORAL
Qty: 360 TABLET | Refills: 1 | Status: SHIPPED | OUTPATIENT
Start: 2022-01-27

## 2022-07-27 RX ORDER — LEVOTHYROXINE SODIUM 0.12 MG/1
TABLET ORAL
Qty: 90 TABLET | Refills: 3 | Status: SHIPPED | OUTPATIENT
Start: 2022-07-27 | End: 2022-08-05 | Stop reason: SDUPTHER

## 2022-07-27 RX ORDER — PANTOPRAZOLE SODIUM 40 MG/1
TABLET, DELAYED RELEASE ORAL
Qty: 90 TABLET | Refills: 3 | Status: SHIPPED | OUTPATIENT
Start: 2022-07-27

## 2022-08-05 RX ORDER — LEVOTHYROXINE SODIUM 0.12 MG/1
TABLET ORAL
Qty: 90 TABLET | Refills: 3 | Status: SHIPPED | OUTPATIENT
Start: 2022-08-05

## 2022-08-05 RX ORDER — LEVOTHYROXINE SODIUM 0.12 MG/1
TABLET ORAL
Qty: 90 TABLET | Refills: 3 | OUTPATIENT
Start: 2022-08-05

## 2022-08-05 NOTE — TELEPHONE ENCOUNTER
Per patient, cancel script of 7/27/22 that was sent to [facility].  Cancelled. Please resubmit to [facility].     1/22/2020        Future Appointments   Date Time Provider Bhavin Jerry   8/26/2022 10:00 AM DO Paramjit Marcial

## 2022-08-26 ENCOUNTER — OFFICE VISIT (OUTPATIENT)
Dept: FAMILY MEDICINE CLINIC | Age: 87
End: 2022-08-26
Payer: MEDICARE

## 2022-08-26 VITALS
BODY MASS INDEX: 23.9 KG/M2 | DIASTOLIC BLOOD PRESSURE: 78 MMHG | TEMPERATURE: 97.2 F | HEART RATE: 54 BPM | OXYGEN SATURATION: 95 % | HEIGHT: 64 IN | RESPIRATION RATE: 16 BRPM | WEIGHT: 140 LBS | SYSTOLIC BLOOD PRESSURE: 124 MMHG

## 2022-08-26 DIAGNOSIS — Z00.00 MEDICARE ANNUAL WELLNESS VISIT, SUBSEQUENT: Primary | ICD-10-CM

## 2022-08-26 DIAGNOSIS — E78.00 HYPERCHOLESTEROLEMIA: ICD-10-CM

## 2022-08-26 DIAGNOSIS — E03.9 ACQUIRED HYPOTHYROIDISM: ICD-10-CM

## 2022-08-26 DIAGNOSIS — F02.80 DEMENTIA ASSOCIATED WITH OTHER UNDERLYING DISEASE WITHOUT BEHAVIORAL DISTURBANCE (HCC): ICD-10-CM

## 2022-08-26 DIAGNOSIS — G25.0 ESSENTIAL TREMOR: ICD-10-CM

## 2022-08-26 DIAGNOSIS — I10 HTN (HYPERTENSION), BENIGN: ICD-10-CM

## 2022-08-26 LAB
A/G RATIO: 1.4 (ref 1.1–2.2)
ALBUMIN SERPL-MCNC: 4 G/DL (ref 3.4–5)
ALP BLD-CCNC: 70 U/L (ref 40–129)
ALT SERPL-CCNC: 13 U/L (ref 10–40)
ANION GAP SERPL CALCULATED.3IONS-SCNC: 8 MMOL/L (ref 3–16)
AST SERPL-CCNC: 22 U/L (ref 15–37)
BILIRUB SERPL-MCNC: 0.5 MG/DL (ref 0–1)
BUN BLDV-MCNC: 18 MG/DL (ref 7–20)
CALCIUM SERPL-MCNC: 9.4 MG/DL (ref 8.3–10.6)
CHLORIDE BLD-SCNC: 102 MMOL/L (ref 99–110)
CHOLESTEROL, TOTAL: 169 MG/DL (ref 0–199)
CO2: 31 MMOL/L (ref 21–32)
CREAT SERPL-MCNC: 0.8 MG/DL (ref 0.6–1.2)
GFR AFRICAN AMERICAN: >60
GFR NON-AFRICAN AMERICAN: >60
GLUCOSE BLD-MCNC: 96 MG/DL (ref 70–99)
HCT VFR BLD CALC: 36.1 % (ref 36–48)
HDLC SERPL-MCNC: 61 MG/DL (ref 40–60)
HEMOGLOBIN: 12.2 G/DL (ref 12–16)
LDL CHOLESTEROL CALCULATED: 87 MG/DL
MCH RBC QN AUTO: 32.6 PG (ref 26–34)
MCHC RBC AUTO-ENTMCNC: 33.9 G/DL (ref 31–36)
MCV RBC AUTO: 96.2 FL (ref 80–100)
PDW BLD-RTO: 13.5 % (ref 12.4–15.4)
PLATELET # BLD: 187 K/UL (ref 135–450)
PMV BLD AUTO: 8.3 FL (ref 5–10.5)
POTASSIUM SERPL-SCNC: 3.6 MMOL/L (ref 3.5–5.1)
RBC # BLD: 3.75 M/UL (ref 4–5.2)
SODIUM BLD-SCNC: 141 MMOL/L (ref 136–145)
TOTAL PROTEIN: 6.8 G/DL (ref 6.4–8.2)
TRIGL SERPL-MCNC: 106 MG/DL (ref 0–150)
TSH REFLEX FT4: 0.35 UIU/ML (ref 0.27–4.2)
VLDLC SERPL CALC-MCNC: 21 MG/DL
WBC # BLD: 4.8 K/UL (ref 4–11)

## 2022-08-26 PROCEDURE — G0439 PPPS, SUBSEQ VISIT: HCPCS | Performed by: FAMILY MEDICINE

## 2022-08-26 PROCEDURE — 99213 OFFICE O/P EST LOW 20 MIN: CPT | Performed by: FAMILY MEDICINE

## 2022-08-26 PROCEDURE — 1123F ACP DISCUSS/DSCN MKR DOCD: CPT | Performed by: FAMILY MEDICINE

## 2022-08-26 RX ORDER — MEMANTINE HYDROCHLORIDE 5 MG/1
5 TABLET ORAL 2 TIMES DAILY
Qty: 180 TABLET | Refills: 1 | Status: SHIPPED | OUTPATIENT
Start: 2022-08-26

## 2022-08-26 ASSESSMENT — PATIENT HEALTH QUESTIONNAIRE - PHQ9
SUM OF ALL RESPONSES TO PHQ9 QUESTIONS 1 & 2: 0
1. LITTLE INTEREST OR PLEASURE IN DOING THINGS: 0
SUM OF ALL RESPONSES TO PHQ QUESTIONS 1-9: 0
2. FEELING DOWN, DEPRESSED OR HOPELESS: 0
SUM OF ALL RESPONSES TO PHQ QUESTIONS 1-9: 0

## 2022-08-26 NOTE — PROGRESS NOTES
Medicare Annual Wellness Visit    Teressa Stubbs is here for Medicare AWV    Assessment & Plan   Medicare annual wellness visit, subsequent  Dementia associated with other underlying disease without behavioral disturbance ([address] chronic, worsening, will add namendia to the aricept. Recheck 4 weeks as needed. Recommendations for Preventive Services Due: see orders and patient instructions/AVS.  Recommended screening schedule for the next 5-10 years is provided to the patient in written form: see Patient Instructions/AVS.     Return for Medicare Annual Wellness Visit in 1 year. Subjective     Complains of increasing memory loss, jessica feels she is ok but daughter has noticed changes. Interested in changing dose of med or adding namendia  Patient's complete Health Risk Assessment and screening values have been reviewed and are found in [address]. The following problems were reviewed today and where indicated follow up appointments were made and/or referrals ordered. Positive Risk Factor Screenings with Interventions:     Cognitive: Words recalled: 1 Word Recalled  Total Score Interpretation: Abnormal Mini-Cog  Did the patient refuse to take the cognition test?: No         General Health and ACP:       Advance Directives       Power of  Living Will ACP-Advance Directive ACP-Power of     Not on File Not on File Not on File Not on File        General Health Risk Interventions:  Information given in the after visit summary                Objective   Vitals:    08/26/22 1017   BP: 124/78   Site: Left Upper Arm   Position: Sitting   Pulse: 54   Resp: 16   Temp: 97.2 °F (36.2 °C)   SpO2: 95%   Weight: 140 lb (63.5 kg)   Height: 5' 3.5\" (1.613 m)      Body mass index is 24.41 kg/m². Well developed well nourished patient   in no acute distress. Alert and oriented to person, place, and time. HEENT:Normocephalic, atraumatic. No scleral icterus. Pupils normal  Heart: Regular Rate and Rhythm. Respirations: lungs clear to auscultation bilaterally. Extremities: No peripheral edema. No erythema. Allergies   Allergen Reactions    Vicodin [Hydrocodone-Acetaminophen] Rash     Prior to Visit Medications    Medication Sig Taking? Authorizing Provider   memantine (NAMENDA) 5 MG tablet Take 1 tablet by mouth 2 times daily Yes Xochilt Gravel, DO   levothyroxine (SYNTHROID) 125 MCG tablet Take 1 tablet every day Yes Xochilt Gravel, DO   pantoprazole (PROTONIX) 40 MG tablet Take 1 tablet every morning before breakfast Yes Xochilt Gravel, DO   hydroCHLOROthiazide (MICROZIDE) 12.5 MG capsule TAKE 1 CAPSULE EVERY DAY Yes Xochilt Gravel, DO   propranolol (INDERAL) 20 MG tablet TAKE 1 TABLET FOUR TIMES DAILY Yes Xochilt Gravel, DO   donepezil (ARICEPT) 10 MG tablet TAKE 1 TABLET EVERY NIGHT Yes Xochilt Gravel, DO   fluticasone (FLONASE) 50 MCG/ACT nasal spray USE 2 SPRAYS IN EACH NOSTRIL EVERY DAY Yes Xochilt Gravel, DO   doxycycline monohydrate (MONODOX) 100 MG capsule TAKE 1 CAPSULE BY MOUTH TWICE A DAY FOR 14 DAYS Yes Historical Provider, MD   Calcium Carb-Cholecalciferol 1000-800 MG-UNIT TABS Take by mouth Yes Historical Provider, MD   Bioflavonoid Products (BIOFLEX PO) Take by mouth Yes Historical Provider, MD   aspirin 81 MG chewable tablet Take 81 mg by mouth daily Yes Historical Provider, MD   docusate sodium (COLACE) 100 MG capsule Take 100 mg by mouth as needed Yes Historical Provider, MD   ibuprofen (ADVIL;MOTRIN) 200 MG CAPS Take 1 capsule by mouth as needed. Yes Historical Provider, MD   Multiple Vitamin (MULTI-VITAMIN) TABS Take 1 tablet by mouth as needed.  Yes Historical Provider, MD   primidone (MYSOLINE) 50 MG tablet TAKE 1 TABLET THREE TIMES DAILY  Patient not taking: Reported on 8/26/2022  Xochilt Gravel, DO   carbamide peroxide (DEBROX) 6.5 % otic solution Place 5 drops into both ears as needed (clogged feeling)  Patient not taking: No sig reported  Xochilt Gravel, DO   albuterol sulfate  (90 Base) MCG/ACT inhaler Inhale 2 puffs into the lungs 4 times daily as needed for Wheezing  Patient not taking: Reported on 8/26/2022  RODY Kearney - CNP       CareTejosé luis (Including outside providers/suppliers regularly involved in providing care):   Patient Care Team:  Susy Sutton DO as PCP - General (Family Medicine)  Susy Sutton DO as PCP - [facility] Empaneled Provider     Reviewed and updated this visit:  Tobacco  Allergies  Meds  Med Hx  Surg Hx  Soc Hx  Fam Hx

## 2022-08-26 NOTE — PATIENT INSTRUCTIONS
Personalized Preventive Plan for Juan Ortega - 8/26/2022  Living Will    A living will is a legal form you use to write down the kind of care you want at the end of your life. It is used by the health professionals who will treat you if you aren't able to decide for yourself. If you put your wishes in writing, your loved ones and others will know what kind of care you want. They won't need to guess. This can ease your mind and be helpful to others. A living will is not the same as an estate or property will. An estate will explains what you want to happen with your money and property after you die. Is a living will a legal document? A living will is a legal document. Each state has its own laws about living shook. If you move to another state, make sure that your living will is legal in the state where you now live. Or you might use a universal form that has been approved by many states. This kind of form can sometimes be completed and stored online. Your electronic copy will then be available wherever you have a connection to the Internet. In most cases, doctors will respect your wishes even if you have a form from a different state. You don't need an  to complete a living will. But legal advice can be helpful if your state's laws are unclear, your health history is complicated, or your family can't agree on what should be in your living will. You can change your living will at any time. Some people find that their wishes about end-of-life care change as their health changes. In addition to making a living will, think about completing a medical power of  form. This form lets you name the person you want to make end-of-life treatment decisions for you (your \"health care agent\") if you're not able to. Many hospitals and nursing homes will give you the forms you need to complete a living will and a medical power of .   Your living will is used only if you can't make or communicate decisions for yourself anymore. If you become able to make decisions again, you can accept or refuse any treatment, no matter what you wrote in your living will. Your state may offer an online registry. This is a place where you can store your living will online so the doctors and nurses who need to treat you can find it right away. What should you think about when creating a living will? Talk about your end-of-life wishes with your family members and your doctor. Let them know what you want. That way the people making decisions for you won't be surprised by your choices. Think about these questions as you make your living will:  Do you know enough about life support methods that might be used? If not, talk to your doctor so you know what might be done if you can't breathe on your own, your heart stops, or you're unable to swallow. What things would you still want to be able to do after you receive life-support methods? Would you want to be able to walk? To speak? To eat on your own? To live without the help of machines? If you have a choice, where do you want to be cared for? In your home? At a hospital or nursing home? Do you want certain Hinduism practices performed if you become very ill? If you have a choice at the end of your life, where would you prefer to die? At home? In a hospital or nursing home? Somewhere else? Would you prefer to be buried or cremated? Do you want your organs to be donated after you die? What should you do with your living will? Make sure that your family members and your health care agent have copies of your living will. Give your doctor a copy of your living will to keep in your medical record. If you have more than one doctor, make sure that each one has a copy. You may want to put a copy of your living will where it can be easily found. Where can you learn more? Go to https://sergey.Bookitit. org and sign in to your veriCAR account.  Enter L469 in the Search Health Information box to learn more about \"Learning About Living [place]. \"     If you do not have an account, please click on the \"Sign Up Now\" link. Current as of: April 19, 2018  Content Version: 11.8  © 3527-9720 Healthwise, Incorporated. Care instructions adapted under license by [facility] ([facility]). If you have questions about a medical condition or this instruction, always ask your healthcare professional. [address] any warranty or liability for your use of this information. FALL PREVENTION    Getting around your home safely can be a challenge if you have injuries or health problems that make it easy for you to fall. Loose rugs and furniture in walkways are among the dangers for many older people who have problems walking or who have poor eyesight. People who have conditions such as arthritis, osteoporosis, or dementia also have to be careful not to fall. You can make your home safer with a few simple measures. Follow-up care is a key part of your treatment and safety. Be sure to make and go to all appointments, and call your doctor if you are having problems. It's also a good idea to know your test results and keep a list of the medicines you take. How can you care for yourself at home? Taking care of yourself  You may get dizzy if you do not drink enough water. To prevent dehydration, drink plenty of fluids, enough so that your urine is light yellow or clear like water. Choose water and other caffeine-free clear liquids. If you have kidney, heart, or liver disease and have to limit fluids, talk with your doctor before you increase the amount of fluids you drink. Exercise regularly to improve your strength, muscle tone, and balance. Walk if you can. Swimming may be a good choice if you cannot walk easily. Have your vision and hearing checked each year or any time you notice a change.  If you have trouble seeing and hearing, you might not be able to avoid objects and could the grass when the sidewalks are slippery. If you live in an area that gets snow and ice in the winter, sprinkle salt on slippery steps and sidewalks. Preventing falls in the bath  Install grab bars and nonskid mats inside and outside your shower or tub and near the toilet and sinks. Use shower chairs and bath benches. Use a hand-held shower head that will allow you to sit while showering. Get into a tub or shower by putting the weaker leg in first. Get out of a tub or shower with your strong side first.  Repair loose toilet seats and consider installing a raised toilet seat to make getting on and off the toilet easier. Keep your bathroom door unlocked while you are in the shower. For a hearing check, you can arrange through:  [facility] in [facility] or [place]. [phone]        For evaluation for dementia or memory loss, you can arrange through:  Dr. [facility] Neurology   [address] [facility], [address] [address]  [place], [facility]  Phone- [phone]  or  You can try [address] [place] [address] [address] Neurology  [phone]  [address] can help seniors with transportation and other needs. You can call them at [phone]    Medicare offers a range of preventive health benefits. Some of the tests and screenings are paid in full while other may be subject to a deductible, co-insurance, and/or copay. Some of these benefits include a comprehensive review of your medical history including lifestyle, illnesses that may run in your family, and various assessments and screenings as appropriate. After reviewing your medical record and screening and assessments performed today your provider may have ordered immunizations, labs, imaging, and/or referrals for you. A list of these orders (if applicable) as well as your Preventive Care list are included within your After Visit Summary for your review.     Other Preventive Recommendations:    A preventive eye exam performed by an eye specialist is recommended every 1-2 years to screen for glaucoma; cataracts, macular degeneration, and other eye disorders. A preventive dental visit is recommended every 6 months. Try to get at least 150 minutes of exercise per week or 10,000 steps per day on a pedometer . Order or download the FREE \"Exercise & Physical Activity: Your Everyday Guide\" from [employer]. Call 4-548.487.3509 or search [employer] online. You need 6992-1145 mg of calcium and 6634-8360 IU of vitamin D per day. It is possible to meet your calcium requirement with diet alone, but a vitamin D supplement is usually necessary to meet this goal.  When exposed to the sun, use a sunscreen that protects against both UVA and UVB radiation with an SPF of 30 or greater. Reapply every 2 to 3 hours or after sweating, drying off with a towel, or swimming. Always wear a seat belt when traveling in a car. Always wear a helmet when riding a bicycle or motorcycle.

## 2022-12-06 ENCOUNTER — TELEPHONE (OUTPATIENT)
Dept: FAMILY MEDICINE CLINIC | Age: 87
End: 2022-12-06

## 2022-12-06 NOTE — TELEPHONE ENCOUNTER
SYMPTOMS x 2 days:    Eating very little  Drinking very little  Pain while breathing on right side only (denies SOB)  Cough   Weakness  Doesn't want to get out of bed    Advised to do a home COVID TEST. Daughter will call back with results. Advised of [facility]. Are you okay with [facility] coming to patient's home? ?

## 2022-12-09 DIAGNOSIS — G25.0 TREMOR, ESSENTIAL: ICD-10-CM

## 2022-12-09 DIAGNOSIS — G25.0 ESSENTIAL TREMOR: ICD-10-CM

## 2022-12-09 DIAGNOSIS — I10 HTN (HYPERTENSION), BENIGN: ICD-10-CM

## 2022-12-10 DIAGNOSIS — F02.80 DEMENTIA DUE TO ALZHEIMER'S DISEASE (HCC): ICD-10-CM

## 2022-12-10 DIAGNOSIS — G30.9 DEMENTIA DUE TO ALZHEIMER'S DISEASE (HCC): ICD-10-CM

## 2022-12-12 NOTE — TELEPHONE ENCOUNTER
Future Appointments   Date Time Provider Bhavin Jerry   9/1/2023  9:00 AM [address] 8/26/2022

## 2022-12-12 NOTE — TELEPHONE ENCOUNTER
8/26/2022    Future Appointments   Date Time Provider Bhavin Jerry   9/1/2023  9:00 AM DO Paramjit Henson

## 2022-12-13 RX ORDER — PROPRANOLOL HYDROCHLORIDE 20 MG/1
TABLET ORAL
Qty: 360 TABLET | Refills: 1 | Status: SHIPPED | OUTPATIENT
Start: 2022-12-13

## 2022-12-13 RX ORDER — PRIMIDONE 50 MG/1
TABLET ORAL
Qty: 270 TABLET | Refills: 3 | Status: SHIPPED | OUTPATIENT
Start: 2022-12-13

## 2022-12-13 RX ORDER — DONEPEZIL HYDROCHLORIDE 10 MG/1
TABLET, FILM COATED ORAL
Qty: 90 TABLET | Refills: 3 | Status: SHIPPED | OUTPATIENT
Start: 2022-12-13

## 2022-12-19 ENCOUNTER — OFFICE VISIT (OUTPATIENT)
Dept: FAMILY MEDICINE CLINIC | Age: 87
End: 2022-12-19
Payer: MEDICARE

## 2022-12-19 VITALS — HEART RATE: 67 BPM | SYSTOLIC BLOOD PRESSURE: 118 MMHG | DIASTOLIC BLOOD PRESSURE: 75 MMHG

## 2022-12-19 DIAGNOSIS — J01.90 SUBACUTE SINUSITIS, UNSPECIFIED LOCATION: Primary | ICD-10-CM

## 2022-12-19 PROCEDURE — 1123F ACP DISCUSS/DSCN MKR DOCD: CPT | Performed by: STUDENT IN AN ORGANIZED HEALTH CARE EDUCATION/TRAINING PROGRAM

## 2022-12-19 PROCEDURE — 99214 OFFICE O/P EST MOD 30 MIN: CPT | Performed by: STUDENT IN AN ORGANIZED HEALTH CARE EDUCATION/TRAINING PROGRAM

## 2022-12-19 PROCEDURE — G8484 FLU IMMUNIZE NO ADMIN: HCPCS | Performed by: STUDENT IN AN ORGANIZED HEALTH CARE EDUCATION/TRAINING PROGRAM

## 2022-12-19 PROCEDURE — 1036F TOBACCO NON-USER: CPT | Performed by: STUDENT IN AN ORGANIZED HEALTH CARE EDUCATION/TRAINING PROGRAM

## 2022-12-19 PROCEDURE — G8427 DOCREV CUR MEDS BY ELIG CLIN: HCPCS | Performed by: STUDENT IN AN ORGANIZED HEALTH CARE EDUCATION/TRAINING PROGRAM

## 2022-12-19 PROCEDURE — G8420 CALC BMI NORM PARAMETERS: HCPCS | Performed by: STUDENT IN AN ORGANIZED HEALTH CARE EDUCATION/TRAINING PROGRAM

## 2022-12-19 PROCEDURE — 1090F PRES/ABSN URINE INCON ASSESS: CPT | Performed by: STUDENT IN AN ORGANIZED HEALTH CARE EDUCATION/TRAINING PROGRAM

## 2022-12-19 RX ORDER — AMOXICILLIN AND CLAVULANATE POTASSIUM 875; 125 MG/1; MG/1
1 TABLET, FILM COATED ORAL 2 TIMES DAILY
Qty: 20 TABLET | Refills: 0 | Status: SHIPPED | OUTPATIENT
Start: 2022-12-19 | End: 2022-12-29

## 2022-12-19 NOTE — PROGRESS NOTES
CC:   Chief Complaint   Patient presents with    Fatigue     Illness for about 2 weeks. Fatigued, little to no appetite. Cough as well. MERE Scott is a 81 y.o. female here for cough, dyspepsia. She has short-term memory loss and her daughter provides the history. She started to feel bad around 2 weeks ago. At that time she was initially having some diarrhea and was not eating or drinking very well. She was initially quite sleepy as well and has been coughing. [facility] was sent to her house and she had some testing done such as COVID, flu, urinalysis which all came back normal.  She has not vomited but has been very gassy and has continued to have cough and congestion. She states that she has problems with allergies. She reports that she feels well at this point time and is denying that she is not eating well. Review of Systems  As above. Vitals:    12/19/22 1259   BP: 118/75   Site: Right Upper Arm   Position: Sitting   Cuff Size: Small Adult   Pulse: 67     Physical Exam  Vitals reviewed. Constitutional:       General: She is not in acute distress. Appearance: She is not ill-appearing or toxic-appearing. HENT:      Head: Normocephalic and atraumatic. Nose: Congestion and rhinorrhea present. Eyes:      General: No scleral icterus. Extraocular Movements: Extraocular movements intact. Pupils: Pupils are equal, round, and reactive to light. Neck:      Vascular: No carotid bruit. Cardiovascular:      Rate and Rhythm: Normal rate and regular rhythm. Heart sounds: No murmur heard. No friction rub. No gallop. Pulmonary:      Effort: Pulmonary effort is normal. No respiratory distress. Breath sounds: Rhonchi present. No wheezing or rales. Abdominal:      General: There is no distension. Palpations: Abdomen is soft. Tenderness: There is no abdominal tenderness. Musculoskeletal:      Cervical back: Normal range of motion. Right lower leg: No edema. Left lower leg: No edema. Skin:     General: Skin is warm and dry. Neurological:      General: No focal deficit present. Mental Status: She is alert and oriented to person, place, and time. Psychiatric:         Mood and Affect: Mood normal.         Behavior: Behavior normal.       A/P  1. Subacute sinusitis, unspecified location  Her symptoms are consistent with a subacute sinusitis. I do not find any clear evidence of pneumonia today. I do not think she requires hospitalization at this time, however she does not start increasing her oral intake a little bit more she may ultimately require hospitalization which was discussed with her and her daughter today. I discussed the risks, benefits, side effects of Augmentin which was prescribed to help with her symptoms. - amoxicillin-clavulanate (AUGMENTIN) 875-125 MG per tablet; Take 1 tablet by mouth 2 times daily for 10 days  Dispense: 20 tablet; Refill: 0    April Johansen MD  Internal Medicine and Sports Medicine    Please note that this chart was at least partially generated using dragon dictation software. Although every effort was made to ensure the accuracy of this automated transcription, some errors in transcription may have occurred.

## 2023-01-17 RX ORDER — HYDROCHLOROTHIAZIDE 12.5 MG/1
CAPSULE, GELATIN COATED ORAL
Qty: 90 CAPSULE | Refills: 1 | Status: SHIPPED | OUTPATIENT
Start: 2023-01-17

## 2023-01-17 RX ORDER — MEMANTINE HYDROCHLORIDE 5 MG/1
TABLET ORAL
Qty: 180 TABLET | Refills: 1 | Status: SHIPPED | OUTPATIENT
Start: 2023-01-17

## 2023-01-17 NOTE — TELEPHONE ENCOUNTER
8/26/2022    Future Appointments   Date Time Provider Bhavin Jerry   9/1/2023  9:00 AM DO Paramjit Braxton

## 2023-01-20 ENCOUNTER — TELEPHONE (OUTPATIENT)
Dept: FAMILY MEDICINE CLINIC | Age: 88
End: 2023-01-20

## 2023-01-20 NOTE — TELEPHONE ENCOUNTER
[employer]     [address] [place] [address] [place] [address]  [phone]  Edmundo Her was the one who took Radha Pu on the driving test.    Called spoke with Benjamin Osei and got information of driving school, called school requested record to be faxed to [phone] Adali Weiss  The driving school said it would be Monday before they could fax record due to they have to have someone get it out of their records department. I will be watching for this to come through.

## 2023-01-20 NOTE — TELEPHONE ENCOUNTER
Daughter Benjamin Osei called. She stated will or a year ago in fall of 2021 her mom did a driving test that was to be sent to Dr. Salomón Alberto to sign off on. She said thye never heard anything  Daughter is asking to talk with Dr. Salomón Alberto directly.   Need direction on what to do

## 2023-01-20 NOTE — TELEPHONE ENCOUNTER
I do not see a driving test in [employer] or anywhere. Get a copy of it, and I can sign off after reviewing. any other questions, let me know what they are

## 2023-01-26 ENCOUNTER — OFFICE VISIT (OUTPATIENT)
Dept: FAMILY MEDICINE CLINIC | Age: 88
End: 2023-01-26
Payer: MEDICARE

## 2023-01-26 VITALS
TEMPERATURE: 97.1 F | WEIGHT: 132 LBS | HEART RATE: 84 BPM | SYSTOLIC BLOOD PRESSURE: 108 MMHG | HEIGHT: 63 IN | OXYGEN SATURATION: 93 % | BODY MASS INDEX: 23.39 KG/M2 | DIASTOLIC BLOOD PRESSURE: 62 MMHG

## 2023-01-26 DIAGNOSIS — F02.80 DEMENTIA DUE TO ALZHEIMER'S DISEASE (HCC): Primary | ICD-10-CM

## 2023-01-26 DIAGNOSIS — R41.3 MEMORY IMPAIRMENT: ICD-10-CM

## 2023-01-26 DIAGNOSIS — G30.9 DEMENTIA DUE TO ALZHEIMER'S DISEASE (HCC): Primary | ICD-10-CM

## 2023-01-26 PROCEDURE — G8420 CALC BMI NORM PARAMETERS: HCPCS | Performed by: STUDENT IN AN ORGANIZED HEALTH CARE EDUCATION/TRAINING PROGRAM

## 2023-01-26 PROCEDURE — 1090F PRES/ABSN URINE INCON ASSESS: CPT | Performed by: STUDENT IN AN ORGANIZED HEALTH CARE EDUCATION/TRAINING PROGRAM

## 2023-01-26 PROCEDURE — G8484 FLU IMMUNIZE NO ADMIN: HCPCS | Performed by: STUDENT IN AN ORGANIZED HEALTH CARE EDUCATION/TRAINING PROGRAM

## 2023-01-26 PROCEDURE — G8427 DOCREV CUR MEDS BY ELIG CLIN: HCPCS | Performed by: STUDENT IN AN ORGANIZED HEALTH CARE EDUCATION/TRAINING PROGRAM

## 2023-01-26 PROCEDURE — 99214 OFFICE O/P EST MOD 30 MIN: CPT | Performed by: STUDENT IN AN ORGANIZED HEALTH CARE EDUCATION/TRAINING PROGRAM

## 2023-01-26 PROCEDURE — 1036F TOBACCO NON-USER: CPT | Performed by: STUDENT IN AN ORGANIZED HEALTH CARE EDUCATION/TRAINING PROGRAM

## 2023-01-26 PROCEDURE — 1123F ACP DISCUSS/DSCN MKR DOCD: CPT | Performed by: STUDENT IN AN ORGANIZED HEALTH CARE EDUCATION/TRAINING PROGRAM

## 2023-01-26 RX ORDER — MEMANTINE HYDROCHLORIDE 10 MG/1
10 TABLET ORAL 2 TIMES DAILY
Qty: 60 TABLET | Refills: 0 | Status: SHIPPED | OUTPATIENT
Start: 2023-01-26

## 2023-01-26 SDOH — ECONOMIC STABILITY: FOOD INSECURITY: WITHIN THE PAST 12 MONTHS, THE FOOD YOU BOUGHT JUST DIDN'T LAST AND YOU DIDN'T HAVE MONEY TO GET MORE.: NEVER TRUE

## 2023-01-26 SDOH — ECONOMIC STABILITY: FOOD INSECURITY: WITHIN THE PAST 12 MONTHS, YOU WORRIED THAT YOUR FOOD WOULD RUN OUT BEFORE YOU GOT MONEY TO BUY MORE.: NEVER TRUE

## 2023-01-26 ASSESSMENT — PATIENT HEALTH QUESTIONNAIRE - PHQ9: DEPRESSION UNABLE TO ASSESS: FUNCTIONAL CAPACITY MOTIVATION LIMITS ACCURACY

## 2023-01-26 ASSESSMENT — SOCIAL DETERMINANTS OF HEALTH (SDOH): HOW HARD IS IT FOR YOU TO PAY FOR THE VERY BASICS LIKE FOOD, HOUSING, MEDICAL CARE, AND HEATING?: NOT HARD AT ALL

## 2023-01-26 NOTE — PROGRESS NOTES
[address] [place] [address], [address]  Tel: [phone]      2023     Esther Reyes (:  1935) is a 81 y.o. female, here for evaluation of the following medical concerns:  Chief Complaint   Patient presents with    Memory Loss     Just not been herself/ discuss medications / been angry        HPI  Patient is a 73-year-old female with history of HTN, hypothyroidism, dementia secondary Alzheimer's presents to discuss medications for memory issues. Patient presents with her daughter. Sister is power of . Patient had driving evaluation at [employer]   driving assessment on , recommendation was to repeat driving test in 6 months to 1 year. Daughter reports having test was very expensive, and upsetting for patient. The results took a very long to obtain and they do not intend to repeat the test.   Last weekend patient's vehicle was taken away by her daughter for her safety. Lisbeth Ray was very upset with outcome, she had called the police and was very agitated with her family. Patient lives by herself in her home. Her daughter comes by to provide her with meals and help her with laundry. Patient has called the police 4 times on her family. Family is concerned that Adult Protective Services will take patient away if she continues to call them. Patient does not want to move to a nursing home. Family has concern for pt'sn memory, forgetfulness. Patient lives alone in her own home. Memantine 5mg twice daily added in 2022 by Dr. Abel Thakkar, patient has been tolerating the medication. Currently taking memantine and Aricept 10 mg daily. Patient also takes primidone and propanolol for tremor. Patient is able to use the bathroom on her own. Patient denies any issues with insomnia, urinary incontinence, headaches, dizziness, nausea, confusion or hallucinations.   Patient is able to take her medications, which are allocated in pill boxes, tends to forget afternoon dose. Patient has a flight of stairs inside of the house, 14 steps with a railing. Denies any falls. No new medications or recent acute illness. No history of cardiovascular disease. Denies getting lost in familiar places. No problems with language or word finding. Daughter helps pay her bills and with finances. Hypothyroidism   Well controlled. Taking levothyroxine 125 mg daily,           Prior to Visit Medications    Medication Sig Taking?  Authorizing Provider   memantine (NAMENDA) 10 MG tablet Take 1 tablet by mouth 2 times daily Yes Richard Balderas MD   hydroCHLOROthiazide (MICROZIDE) 12.5 MG capsule TAKE 1 CAPSULE EVERY DAY Yes Betsy Needle, DO   memantine (NAMENDA) 5 MG tablet TAKE 1 TABLET TWICE DAILY Yes Betsy Needle, DO   propranolol (INDERAL) 20 MG tablet TAKE 1 TABLET FOUR TIMES DAILY Yes Betsy Needle, DO   primidone (MYSOLINE) 50 MG tablet TAKE 1 TABLET THREE TIMES DAILY Yes Betsy Needle, DO   donepezil (ARICEPT) 10 MG tablet TAKE 1 TABLET EVERY NIGHT Yes Betsy Needle, DO   levothyroxine (SYNTHROID) 125 MCG tablet Take 1 tablet every day Yes Betsy Needle, DO   pantoprazole (PROTONIX) 40 MG tablet Take 1 tablet every morning before breakfast Yes Betsy Needle, DO   fluticasone (FLONASE) 50 MCG/ACT nasal spray USE 2 SPRAYS IN EACH NOSTRIL EVERY DAY Yes Betsy Needle, DO   doxycycline monohydrate (MONODOX) 100 MG capsule TAKE 1 CAPSULE BY MOUTH TWICE A DAY FOR 14 DAYS Yes Historical Provider, MD   carbamide peroxide (DEBROX) 6.5 % otic solution Place 5 drops into both ears as needed (clogged feeling) Yes Betsy Needle, DO   albuterol sulfate  (90 Base) MCG/ACT inhaler Inhale 2 puffs into the lungs 4 times daily as needed for Wheezing Yes RODY Arana - CNP   Calcium Carb-Cholecalciferol 1000-800 MG-UNIT TABS Take by mouth Yes Historical Provider, MD   Bioflavonoid Products (BIOFLEX PO) Take by mouth Yes Historical Provider, MD   aspirin 81 MG chewable tablet Take 81 mg by mouth daily Yes Historical Provider, MD   docusate sodium (COLACE) 100 MG capsule Take 100 mg by mouth as needed Yes Historical Provider, MD   ibuprofen (ADVIL;MOTRIN) 200 MG CAPS Take 1 capsule by mouth as needed. Yes Historical Provider, MD   Multiple Vitamin (MULTI-VITAMIN) TABS Take 1 tablet by mouth as needed. Yes Historical Provider, MD        Social History     Tobacco Use    Smoking status: Never    Smokeless tobacco: Never    Tobacco comments:     Lived with a smoker for 15 year   Substance Use Topics    Alcohol use: No      Physical exam  /62 (Site: Left Upper Arm, Position: Sitting, Cuff Size: Large Adult)   Pulse 84   Temp 97.1 °F (36.2 °C)   Ht 5' 3\" (1.6 m)   Wt 132 lb (59.9 kg)   SpO2 93%   BMI 23.38 kg/m²     Physical Exam  Constitutional:       Appearance: Normal appearance. HENT:      Head: Normocephalic and atraumatic. Right Ear: Ear canal normal.      Left Ear: Ear canal normal.      Nose: Nose normal.      Mouth/Throat:      Mouth: Mucous membranes are moist.   Eyes:      Extraocular Movements: Extraocular movements intact. Conjunctiva/sclera: Conjunctivae normal.      Pupils: Pupils are equal, round, and reactive to light. Cardiovascular:      Rate and Rhythm: Normal rate and regular rhythm. Pulses: Normal pulses. Heart sounds: Normal heart sounds. No murmur heard. Pulmonary:      Effort: Pulmonary effort is normal. No respiratory distress. Breath sounds: Normal breath sounds. No wheezing. Abdominal:      General: Abdomen is flat. Palpations: Abdomen is soft. Musculoskeletal:         General: Normal range of motion. Cervical back: Normal range of motion. Skin:     General: Skin is warm. Findings: No rash. Neurological:      General: No focal deficit present. Mental Status: She is alert. Mental status is at baseline. Cranial Nerves: No cranial nerve deficit.       Motor: Tremor (constant tremor in hands at rest) present. No weakness or abnormal muscle tone.      Coordination: Coordination normal. Finger-Nose-Finger Test normal. Rapid alternating movements normal.      Gait: Gait is intact.      Comments: Oriented to person and place and month  Patient is unsure of the date or year   Psychiatric:         Mood and Affect: Mood normal.         Behavior: Behavior normal.         Thought Content: Thought content normal.         Cognition and Memory: Cognition is not impaired. Memory is impaired (Does not remember names of her medications).         Judgment: Judgment normal.   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  No acute neurodeficits on exam.  CN 2-12 intact. Mini-Mental exam score was 24/30.  Currently patient is able to perform ADLs, except she is unable to manage her own finances.  -Currently patient is taking memantine 5mg twice daily  -TSH was within normal limits on 8/26/2022  -Due to tremor ,worsening memory, and inability to repeat driving test.  Patient is advised not to drive.    -Plan to increase memantine dose to 10 mg twice daily  -Follow-up with neurology recommended for further medication management  -Follow-up with PCP in 1 month      Return in about 4 weeks (around 2/23/2023), or if symptoms worsen or fail to improve.    An electronic signature was used to authenticate this note.    --Alec Andrew MD on 1/26/2023

## 2023-01-26 NOTE — PATIENT INSTRUCTIONS
Increase dose Memantine 10mg morning and 5 mg at night 1 week   After 1 week increase dose Memantine 10mg morning and evening

## 2023-01-30 ENCOUNTER — TELEPHONE (OUTPATIENT)
Dept: FAMILY MEDICINE CLINIC | Age: 88
End: 2023-01-30

## 2023-01-30 DIAGNOSIS — R41.89 COGNITIVE IMPAIRMENT: Primary | ICD-10-CM

## 2023-01-30 NOTE — TELEPHONE ENCOUNTER
No she was just wanting to see if we can put in a referral for it because they don't want  to end up being called due to the number of times that Mrs Elbert Gonzales has called them. They do have Geriatric Medicine which specialize in this at [facility].     I pended one

## 2023-01-30 NOTE — TELEPHONE ENCOUNTER
Patient's daughter called  She said they are really in a crisis situation and she is asking for Jonatan Fletcher.   Please call as soon as you can  She would not share with me the details

## 2023-02-14 ENCOUNTER — TELEPHONE (OUTPATIENT)
Dept: FAMILY MEDICINE CLINIC | Age: 88
End: 2023-02-14

## 2023-02-14 DIAGNOSIS — R41.3 MEMORY IMPAIRMENT: Primary | ICD-10-CM

## 2023-02-14 DIAGNOSIS — R41.89 COGNITIVE IMPAIRMENT: ICD-10-CM

## 2023-02-14 DIAGNOSIS — F02.80 DEMENTIA DUE TO ALZHEIMER'S DISEASE (HCC): ICD-10-CM

## 2023-02-14 DIAGNOSIS — G30.9 DEMENTIA DUE TO ALZHEIMER'S DISEASE (HCC): ICD-10-CM

## 2023-02-14 NOTE — TELEPHONE ENCOUNTER
----- Message from Jayne Chaparro sent at 2/14/2023 12:32 PM EST -----  Subject: Referral Request    Reason for referral request? Referral to [facility] [place] and [address] for testing discussed with Dr Marcia De La Rosa. Evaluation for testing,   possible CT scan for memory issues. Patient has an appointment on 02/22. Patients insurance needs Dr to send an authorization and referral to   [facility]. Patient saw Marie Nunes MD last in regards to this   issue. Provider patient wants to be referred to(if known):     Provider Phone Number(if known):[phone]    Additional Information for Provider? Evaluation for testing, possible CT   scan for memory issues. Patient has an appointment on 02/22. Patients   insurance needs Dr to send an authorization and referral to [facility]. Patient saw Marie Nunes MD last in regards to this issue. Spoke with Nadine Buckley per approval from Patient and her son Leola Ruvalcaba.  Patient   needs a list of her medication.  ---------------------------------------------------------------------------  --------------  Robert RONQUILLO    [phone]; OK to leave message on voicemail  ---------------------------------------------------------------------------  --------------

## 2023-02-14 NOTE — TELEPHONE ENCOUNTER
Jude Niño daughter-in-law (not on Hipp[place]) said she was to call and give fax number [phone] for [facility] [place] [facility] authorization and referral. Patient has [employer] prior Gurinder Burdock needs to be done. Patient has an appointment on 2/22/23 with them. Future Appointments   Date Time Provider Bhavin Jerry   9/1/2023  9:00 AM Xochilt Rojas DO [place] [address] ov visit with Dr. Janett Vo 8/26/22.

## 2023-02-16 NOTE — TELEPHONE ENCOUNTER
Spoke with Helena Seth she is going to call me back with Dr's name for the referral.    Helena Seth called me back and it's Dr Rakesh Julian however after I called [address] they do not need a referral.     Pt's appointment was already scheduled.

## 2023-03-20 DIAGNOSIS — R41.3 MEMORY IMPAIRMENT: ICD-10-CM

## 2023-03-20 DIAGNOSIS — F02.80 DEMENTIA DUE TO ALZHEIMER'S DISEASE (HCC): ICD-10-CM

## 2023-03-20 DIAGNOSIS — G30.9 DEMENTIA DUE TO ALZHEIMER'S DISEASE (HCC): ICD-10-CM

## 2023-03-21 RX ORDER — MEMANTINE HYDROCHLORIDE 10 MG/1
TABLET ORAL
Qty: 60 TABLET | Refills: 5 | Status: SHIPPED | OUTPATIENT
Start: 2023-03-21

## 2023-03-31 NOTE — TELEPHONE ENCOUNTER
Daughter called and would like to have mother evaluated to be evaluated with [facility] [employer]. Due to the behavior issues associated with mothers dementia. Can not pend referral it is not pulling over for me. They stated that they would like to take her asap. 31-Mar-2023 14:02 31-Mar-2023 16:43

## 2023-05-16 RX ORDER — PANTOPRAZOLE SODIUM 40 MG/1
TABLET, DELAYED RELEASE ORAL
Qty: 90 TABLET | Refills: 3 | Status: SHIPPED | OUTPATIENT
Start: 2023-05-16

## 2023-05-16 NOTE — TELEPHONE ENCOUNTER
LOV 1/26/2023    Future Appointments   Date Time Provider Bhavin Jerry   9/1/2023  9:00 AM DO JOSE Martel [address] [facility]

## 2023-08-15 DIAGNOSIS — F02.80 DEMENTIA DUE TO ALZHEIMER'S DISEASE (HCC): ICD-10-CM

## 2023-08-15 DIAGNOSIS — R41.3 MEMORY IMPAIRMENT: ICD-10-CM

## 2023-08-15 DIAGNOSIS — G30.9 DEMENTIA DUE TO ALZHEIMER'S DISEASE (HCC): ICD-10-CM

## 2023-08-16 RX ORDER — MEMANTINE HYDROCHLORIDE 10 MG/1
TABLET ORAL
Qty: 180 TABLET | Refills: 3 | Status: SHIPPED | OUTPATIENT
Start: 2023-08-16

## 2023-08-16 NOTE — TELEPHONE ENCOUNTER
LOV 1/26/2023    Future Appointments   Date Time Provider [address]   9/1/2023  9:00 AM DO JOSE Loyd [address]

## 2023-09-01 ENCOUNTER — OFFICE VISIT (OUTPATIENT)
Dept: FAMILY MEDICINE CLINIC | Age: 88
End: 2023-09-01
Payer: MEDICARE

## 2023-09-01 VITALS
DIASTOLIC BLOOD PRESSURE: 70 MMHG | TEMPERATURE: 97.1 F | BODY MASS INDEX: 25.47 KG/M2 | WEIGHT: 149.2 LBS | SYSTOLIC BLOOD PRESSURE: 143 MMHG | HEIGHT: 64 IN

## 2023-09-01 DIAGNOSIS — E78.00 HYPERCHOLESTEROLEMIA: ICD-10-CM

## 2023-09-01 DIAGNOSIS — E03.9 ACQUIRED HYPOTHYROIDISM: ICD-10-CM

## 2023-09-01 DIAGNOSIS — I10 HTN (HYPERTENSION), BENIGN: ICD-10-CM

## 2023-09-01 DIAGNOSIS — R10.9 FLANK PAIN: ICD-10-CM

## 2023-09-01 DIAGNOSIS — Z78.0 POSTMENOPAUSAL: ICD-10-CM

## 2023-09-01 DIAGNOSIS — Z00.00 MEDICARE ANNUAL WELLNESS VISIT, SUBSEQUENT: Primary | ICD-10-CM

## 2023-09-01 DIAGNOSIS — M89.9 DISORDER OF BONE, UNSPECIFIED: ICD-10-CM

## 2023-09-01 DIAGNOSIS — B35.1 TINEA UNGUIUM: ICD-10-CM

## 2023-09-01 DIAGNOSIS — M19.90 OSTEOARTHRITIS, UNSPECIFIED OSTEOARTHRITIS TYPE, UNSPECIFIED SITE: ICD-10-CM

## 2023-09-01 LAB
25(OH)D3 SERPL-MCNC: 30.2 NG/ML
ALBUMIN SERPL-MCNC: 4.4 G/DL (ref 3.4–5)
ALBUMIN/GLOB SERPL: 1.8 {RATIO} (ref 1.1–2.2)
ALP SERPL-CCNC: 76 U/L (ref 40–129)
ALT SERPL-CCNC: 18 U/L (ref 10–40)
ANION GAP SERPL CALCULATED.3IONS-SCNC: 9 MMOL/L (ref 3–16)
AST SERPL-CCNC: 23 U/L (ref 15–37)
BILIRUB SERPL-MCNC: 0.4 MG/DL (ref 0–1)
BILIRUBIN, POC: NORMAL
BLOOD URINE, POC: NORMAL
BUN SERPL-MCNC: 19 MG/DL (ref 7–20)
CALCIUM SERPL-MCNC: 9.7 MG/DL (ref 8.3–10.6)
CHLORIDE SERPL-SCNC: 99 MMOL/L (ref 99–110)
CHOLEST SERPL-MCNC: 185 MG/DL (ref 0–199)
CLARITY, POC: NORMAL
CO2 SERPL-SCNC: 30 MMOL/L (ref 21–32)
COLOR, POC: NORMAL
CREAT SERPL-MCNC: 0.9 MG/DL (ref 0.6–1.2)
DEPRECATED RDW RBC AUTO: 13.6 % (ref 12.4–15.4)
GFR SERPLBLD CREATININE-BSD FMLA CKD-EPI: >60 ML/MIN/{1.73_M2}
GLUCOSE SERPL-MCNC: 91 MG/DL (ref 70–99)
GLUCOSE URINE, POC: NORMAL
HCT VFR BLD AUTO: 35.6 % (ref 36–48)
HDLC SERPL-MCNC: 70 MG/DL (ref 40–60)
HGB BLD-MCNC: 12.1 G/DL (ref 12–16)
KETONES, POC: NORMAL
LDLC SERPL CALC-MCNC: 98 MG/DL
LEUKOCYTE EST, POC: NORMAL
MCH RBC QN AUTO: 32.9 PG (ref 26–34)
MCHC RBC AUTO-ENTMCNC: 34 G/DL (ref 31–36)
MCV RBC AUTO: 96.8 FL (ref 80–100)
NITRITE, POC: NORMAL
PH, POC: 7
PLATELET # BLD AUTO: 223 K/UL (ref 135–450)
PMV BLD AUTO: 8.3 FL (ref 5–10.5)
POTASSIUM SERPL-SCNC: 4 MMOL/L (ref 3.5–5.1)
PROT SERPL-MCNC: 6.8 G/DL (ref 6.4–8.2)
PROTEIN, POC: NORMAL
RBC # BLD AUTO: 3.68 M/UL (ref 4–5.2)
SODIUM SERPL-SCNC: 138 MMOL/L (ref 136–145)
SPECIFIC GRAVITY, POC: 1.02
TRIGL SERPL-MCNC: 83 MG/DL (ref 0–150)
TSH SERPL DL<=0.005 MIU/L-ACNC: 2.94 UIU/ML (ref 0.27–4.2)
UROBILINOGEN, POC: 0.2
VLDLC SERPL CALC-MCNC: 17 MG/DL
WBC # BLD AUTO: 6.7 K/UL (ref 4–11)

## 2023-09-01 PROCEDURE — 1123F ACP DISCUSS/DSCN MKR DOCD: CPT | Performed by: FAMILY MEDICINE

## 2023-09-01 PROCEDURE — 81002 URINALYSIS NONAUTO W/O SCOPE: CPT | Performed by: FAMILY MEDICINE

## 2023-09-01 PROCEDURE — G0439 PPPS, SUBSEQ VISIT: HCPCS | Performed by: FAMILY MEDICINE

## 2023-09-01 RX ORDER — SERTRALINE HYDROCHLORIDE 25 MG/1
1 TABLET, FILM COATED ORAL DAILY
COMMUNITY
Start: 2023-07-11

## 2023-09-01 RX ORDER — RISPERIDONE 0.25 MG/1
0.25 TABLET ORAL EVERY MORNING
COMMUNITY
Start: 2023-04-20

## 2023-09-01 RX ORDER — FEXOFENADINE HCL 180 MG/1
180 TABLET ORAL DAILY
COMMUNITY

## 2023-09-01 SDOH — ECONOMIC STABILITY: FOOD INSECURITY: WITHIN THE PAST 12 MONTHS, THE FOOD YOU BOUGHT JUST DIDN'T LAST AND YOU DIDN'T HAVE MONEY TO GET MORE.: NEVER TRUE

## 2023-09-01 SDOH — ECONOMIC STABILITY: FOOD INSECURITY: WITHIN THE PAST 12 MONTHS, YOU WORRIED THAT YOUR FOOD WOULD RUN OUT BEFORE YOU GOT MONEY TO BUY MORE.: NEVER TRUE

## 2023-09-01 SDOH — ECONOMIC STABILITY: HOUSING INSECURITY
IN THE LAST 12 MONTHS, WAS THERE A TIME WHEN YOU DID NOT HAVE A STEADY PLACE TO SLEEP OR SLEPT IN A SHELTER (INCLUDING NOW)?: NO

## 2023-09-01 SDOH — ECONOMIC STABILITY: INCOME INSECURITY: HOW HARD IS IT FOR YOU TO PAY FOR THE VERY BASICS LIKE FOOD, HOUSING, MEDICAL CARE, AND HEATING?: NOT HARD AT ALL

## 2023-09-01 ASSESSMENT — PATIENT HEALTH QUESTIONNAIRE - PHQ9
SUM OF ALL RESPONSES TO PHQ QUESTIONS 1-9: 0
2. FEELING DOWN, DEPRESSED OR HOPELESS: 0
SUM OF ALL RESPONSES TO PHQ QUESTIONS 1-9: 0
SUM OF ALL RESPONSES TO PHQ9 QUESTIONS 1 & 2: 0
1. LITTLE INTEREST OR PLEASURE IN DOING THINGS: 0

## 2023-09-01 ASSESSMENT — LIFESTYLE VARIABLES: HOW OFTEN DO YOU HAVE A DRINK CONTAINING ALCOHOL: NEVER

## 2023-09-01 NOTE — PATIENT INSTRUCTIONS
week.  It improves your energy and can help you sleep better. Muscle-strengthening. This type of activity can help maintain muscle and strengthen bones. Includes climbing stairs, using resistance bands, and lifting or carrying heavy loads. Aim for at least twice a week. It can help protect the knees and other joints. Stretching. Stretching gives you better range of motion in joints and muscles. Includes upper arm stretches, calf stretches, and gentle yoga. Aim for at least twice a week, preferably after your muscles are warmed up from other activities. It can help you function better in daily life. Balancing. This helps you stay coordinated and have good posture. Includes heel-to-toe walking, сергей chi, and certain types of yoga. Aim for at least 3 days a week. It can reduce your risk of falling. Even if you have a hard time meeting the recommendations, it's better to be more active than less active. All activity done in each category counts toward your weekly total. You'd be surprised how daily things like carrying groceries, keeping up with grandchildren, and taking the stairs can add up. What keeps you from being active? If you've had a hard time being more active, you're not alone. Maybe you remember being able to do more. Or maybe you've never thought of yourself as being active. It's frustrating when you can't do the things you want. Being more active can help. What's holding you back? Getting started. Have a goal, but break it into easy tasks. Small steps build into big accomplishments. Staying motivated. If you feel like skipping your activity, remember your goal. Maybe you want to move better and stay independent. Every activity gets you one step closer. Not feeling your best.  Start with 5 minutes of an activity you enjoy. Prove to yourself you can do it. As you get comfortable, increase your time. You may not be where you want to be. But you're in the process of getting there.

## 2023-09-03 LAB — BACTERIA UR CULT: NORMAL

## 2023-09-15 ENCOUNTER — NURSE ONLY (OUTPATIENT)
Dept: FAMILY MEDICINE CLINIC | Age: 88
End: 2023-09-15

## 2023-09-15 ENCOUNTER — TELEPHONE (OUTPATIENT)
Dept: FAMILY MEDICINE CLINIC | Age: 88
End: 2023-09-15

## 2023-09-15 VITALS — SYSTOLIC BLOOD PRESSURE: 153 MMHG | DIASTOLIC BLOOD PRESSURE: 80 MMHG

## 2023-09-15 NOTE — TELEPHONE ENCOUNTER
Colletta Leander was in today for a BP check    1# 153/77  2#  153/80    She did state that she did take her BP meds this morning    Please call her Daughter Zabrina Baxter at [phone] as well with any information

## 2023-09-15 NOTE — TELEPHONE ENCOUNTER
Bp are a little high.   She will take her blood pressure medication regularly and come in for repeat bp check,  takes there medicine at least 1 hr before her for blood pressure check, as opposed to before she takes her medication

## 2023-11-21 RX ORDER — HYDROCHLOROTHIAZIDE 12.5 MG/1
CAPSULE, GELATIN COATED ORAL
Qty: 90 CAPSULE | Refills: 10 | Status: SHIPPED | OUTPATIENT
Start: 2023-11-21

## 2023-11-21 NOTE — TELEPHONE ENCOUNTER
LOV 9/1/2023    Future Appointments   Date Time Provider [address]   9/13/2024  9:00 AM DO JOSE Osborn [address]

## 2024-02-02 DIAGNOSIS — G25.0 ESSENTIAL TREMOR: ICD-10-CM

## 2024-02-02 DIAGNOSIS — G25.0 TREMOR, ESSENTIAL: ICD-10-CM

## 2024-02-02 DIAGNOSIS — I10 HTN (HYPERTENSION), BENIGN: ICD-10-CM

## 2024-02-02 RX ORDER — PROPRANOLOL HYDROCHLORIDE 20 MG/1
TABLET ORAL
Qty: 360 TABLET | Refills: 3 | Status: SHIPPED | OUTPATIENT
Start: 2024-02-02

## 2024-02-02 RX ORDER — PRIMIDONE 50 MG/1
TABLET ORAL
Qty: 270 TABLET | Refills: 3 | Status: SHIPPED | OUTPATIENT
Start: 2024-02-02

## 2024-02-02 NOTE — TELEPHONE ENCOUNTER
Future Appointments   Date Time Provider Department Center   9/13/2024  9:00 AM Bobbi Cho DO [facility] - D[facility]             LOV 9/1/2023

## 2024-04-11 ENCOUNTER — TELEPHONE (OUTPATIENT)
Dept: FAMILY MEDICINE CLINIC | Age: 89
End: 2024-04-11

## 2024-04-11 NOTE — TELEPHONE ENCOUNTER
[id number] - [place], [place] - [address] - P [phone] - F [phone] [[id number]]     9/1/2023     Future Appointments   Date Time Provider Department Center   9/13/2024  9:00 AM Bobbi Cho DO [facility] [place] [facility]        Patients daughter Lisa called in stating her mother will be going into a senior center.Lisa is concern her mother will be upset . Kate asking if Dr. Cho can prescribe something for her mother to take for the first few days while she is getting adjusted to the senior center. Or can risperiDONE or ZOLOFT be increased to help with the transition.    risperiDONE (RISPERDAL) 0.25 MG tablet [[id number]]   ertraline (ZOLOFT) 25 MG tablet [[id number]]     Please only respond to Lisa at [phone]  Patient does not know her daughter is making this call.

## 2024-04-12 ENCOUNTER — TELEPHONE (OUTPATIENT)
Dept: FAMILY MEDICINE CLINIC | Age: 89
End: 2024-04-12

## 2024-04-12 NOTE — TELEPHONE ENCOUNTER
Pt daughter called asking that her mother be put on a controlled release Inderal as she does not remember to take it 3 times a day. If it is not covered by the patients insurance the family is willing to pay the cost.     They would like that sent to [facility] if you agree.     Please advise.

## 2024-04-12 NOTE — TELEPHONE ENCOUNTER
I do not recommend treating \"just in case\". There are other ways to comfort jessica in her transition to [facility]. She can speak to the senior [place] staff.

## 2024-04-16 RX ORDER — PROPRANOLOL HCL 60 MG
60 CAPSULE, EXTENDED RELEASE 24HR ORAL DAILY
Qty: 30 CAPSULE | Refills: 3 | Status: SHIPPED | OUTPATIENT
Start: 2024-04-16

## 2024-06-14 RX ORDER — PROPRANOLOL HCL 60 MG
60 CAPSULE, EXTENDED RELEASE 24HR ORAL DAILY
Qty: 90 CAPSULE | Refills: 0 | Status: SHIPPED | OUTPATIENT
Start: 2024-06-14

## 2024-06-14 RX ORDER — PANTOPRAZOLE SODIUM 40 MG/1
TABLET, DELAYED RELEASE ORAL
Qty: 90 TABLET | Refills: 3 | Status: SHIPPED | OUTPATIENT
Start: 2024-06-14

## 2024-06-14 RX ORDER — LEVOTHYROXINE SODIUM 0.12 MG/1
TABLET ORAL
Qty: 90 TABLET | Refills: 3 | Status: SHIPPED | OUTPATIENT
Start: 2024-06-14

## 2024-06-21 ENCOUNTER — OFFICE VISIT (OUTPATIENT)
Dept: FAMILY MEDICINE CLINIC | Age: 89
End: 2024-06-21
Payer: MEDICARE

## 2024-06-21 ENCOUNTER — HOSPITAL ENCOUNTER (OUTPATIENT)
Dept: GENERAL RADIOLOGY | Age: 89
Discharge: HOME | End: 2024-06-21
Payer: MEDICARE

## 2024-06-21 VITALS
BODY MASS INDEX: 26.32 KG/M2 | SYSTOLIC BLOOD PRESSURE: 118 MMHG | HEART RATE: 78 BPM | WEIGHT: 154.2 LBS | HEIGHT: 64 IN | DIASTOLIC BLOOD PRESSURE: 62 MMHG | OXYGEN SATURATION: 96 %

## 2024-06-21 DIAGNOSIS — M25.511 CHRONIC RIGHT SHOULDER PAIN: ICD-10-CM

## 2024-06-21 DIAGNOSIS — G89.29 CHRONIC RIGHT SHOULDER PAIN: ICD-10-CM

## 2024-06-21 DIAGNOSIS — M54.9 UPPER BACK PAIN ON RIGHT SIDE: ICD-10-CM

## 2024-06-21 DIAGNOSIS — M54.9 UPPER BACK PAIN ON RIGHT SIDE: Primary | ICD-10-CM

## 2024-06-21 PROBLEM — M25.411 PAIN AND SWELLING OF RIGHT SHOULDER: Status: ACTIVE | Noted: 2024-06-21

## 2024-06-21 PROCEDURE — G8427 DOCREV CUR MEDS BY ELIG CLIN: HCPCS | Performed by: SURGERY

## 2024-06-21 PROCEDURE — G8419 CALC BMI OUT NRM PARAM NOF/U: HCPCS | Performed by: SURGERY

## 2024-06-21 PROCEDURE — 1090F PRES/ABSN URINE INCON ASSESS: CPT | Performed by: SURGERY

## 2024-06-21 PROCEDURE — 99213 OFFICE O/P EST LOW 20 MIN: CPT | Performed by: SURGERY

## 2024-06-21 PROCEDURE — 73030 X-RAY EXAM OF SHOULDER: CPT

## 2024-06-21 PROCEDURE — 1036F TOBACCO NON-USER: CPT | Performed by: SURGERY

## 2024-06-21 PROCEDURE — 72072 X-RAY EXAM THORAC SPINE 3VWS: CPT

## 2024-06-21 PROCEDURE — 1123F ACP DISCUSS/DSCN MKR DOCD: CPT | Performed by: SURGERY

## 2024-06-21 ASSESSMENT — PATIENT HEALTH QUESTIONNAIRE - PHQ9
SUM OF ALL RESPONSES TO PHQ QUESTIONS 1-9: 0
SUM OF ALL RESPONSES TO PHQ9 QUESTIONS 1 & 2: 0
1. LITTLE INTEREST OR PLEASURE IN DOING THINGS: NOT AT ALL
2. FEELING DOWN, DEPRESSED OR HOPELESS: NOT AT ALL
SUM OF ALL RESPONSES TO PHQ QUESTIONS 1-9: 0

## 2024-06-21 NOTE — PROGRESS NOTES
6/21/2024    This is a 89 y.o. female   Chief Complaint   Patient presents with    Back Pain      X 2 months upper  Rt shoulder     Foot Swelling     Both feet and ankles     Finger Pain     Nail came off on Rt hand   .    Pain in the upper right back/shoulder area, no injury, not getting better. pain with certain movements of the arm and in her mid upper back. Has tried APAP/ibuprofen without sustained relief.          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)    Essential tremor    HTN (hypertension), benign    Pain and swelling of right shoulder       Current Outpatient Medications   Medication Sig Dispense Refill    levothyroxine (SYNTHROID) 125 MCG tablet TAKE 1 TABLET EVERY DAY 90 tablet 3    pantoprazole (PROTONIX) 40 MG tablet TAKE 1 TABLET EVERY MORNING BEFORE BREAKFAST 90 tablet 3    propranolol (INDERAL LA) 60 MG extended release capsule TAKE 1 CAPSULE EVERY DAY 90 capsule 0    primidone (MYSOLINE) 50 MG tablet TAKE 1 TABLET THREE TIMES DAILY 270 tablet 3    hydroCHLOROthiazide (MICROZIDE) 12.5 MG capsule TAKE 1 CAPSULE EVERY DAY 90 capsule 10    sertraline (ZOLOFT) 25 MG tablet Take 1 tablet by mouth daily      risperiDONE (RISPERDAL) 0.25 MG tablet Take 1 tablet by mouth every morning      cyanocobalamin 1000 MCG tablet Take 1 tablet by mouth daily      fexofenadine (ALLEGRA) 180 MG tablet Take 1 tablet by mouth daily      memantine (NAMENDA) 10 MG tablet TAKE 1 TABLET TWICE DAILY 180 tablet 3    fluticasone (FLONASE) 50 MCG/ACT nasal spray USE 2 SPRAYS IN EACH NOSTRIL EVERY DAY 48 g 5    carbamide peroxide (DEBROX) 6.5 % otic solution Place 5 drops into both ears as needed (clogged

## 2024-09-11 ENCOUNTER — OFFICE VISIT (OUTPATIENT)
Dept: FAMILY MEDICINE CLINIC | Age: 89
End: 2024-09-11
Payer: MEDICARE

## 2024-09-11 VITALS
WEIGHT: 157 LBS | DIASTOLIC BLOOD PRESSURE: 60 MMHG | SYSTOLIC BLOOD PRESSURE: 120 MMHG | TEMPERATURE: 97.8 F | RESPIRATION RATE: 16 BRPM | BODY MASS INDEX: 27.29 KG/M2 | OXYGEN SATURATION: 98 % | HEART RATE: 58 BPM

## 2024-09-11 DIAGNOSIS — J40 BRONCHITIS: Primary | ICD-10-CM

## 2024-09-11 DIAGNOSIS — F02.80 DEMENTIA DUE TO ALZHEIMER'S DISEASE (HCC): ICD-10-CM

## 2024-09-11 DIAGNOSIS — G30.9 DEMENTIA DUE TO ALZHEIMER'S DISEASE (HCC): ICD-10-CM

## 2024-09-11 DIAGNOSIS — I10 HTN (HYPERTENSION), BENIGN: ICD-10-CM

## 2024-09-11 PROCEDURE — 99214 OFFICE O/P EST MOD 30 MIN: CPT | Performed by: STUDENT IN AN ORGANIZED HEALTH CARE EDUCATION/TRAINING PROGRAM

## 2024-09-11 PROCEDURE — 1123F ACP DISCUSS/DSCN MKR DOCD: CPT | Performed by: STUDENT IN AN ORGANIZED HEALTH CARE EDUCATION/TRAINING PROGRAM

## 2024-09-11 PROCEDURE — G8427 DOCREV CUR MEDS BY ELIG CLIN: HCPCS | Performed by: STUDENT IN AN ORGANIZED HEALTH CARE EDUCATION/TRAINING PROGRAM

## 2024-09-11 PROCEDURE — 1090F PRES/ABSN URINE INCON ASSESS: CPT | Performed by: STUDENT IN AN ORGANIZED HEALTH CARE EDUCATION/TRAINING PROGRAM

## 2024-09-11 PROCEDURE — G8419 CALC BMI OUT NRM PARAM NOF/U: HCPCS | Performed by: STUDENT IN AN ORGANIZED HEALTH CARE EDUCATION/TRAINING PROGRAM

## 2024-09-11 PROCEDURE — 1036F TOBACCO NON-USER: CPT | Performed by: STUDENT IN AN ORGANIZED HEALTH CARE EDUCATION/TRAINING PROGRAM

## 2024-09-11 RX ORDER — ALBUTEROL SULFATE 90 UG/1
2 INHALANT RESPIRATORY (INHALATION) 2 TIMES DAILY
Qty: 8 G | Refills: 0 | Status: SHIPPED | OUTPATIENT
Start: 2024-09-11

## 2024-09-11 RX ORDER — AZITHROMYCIN 250 MG/1
TABLET, FILM COATED ORAL
Qty: 6 TABLET | Refills: 0 | Status: SHIPPED | OUTPATIENT
Start: 2024-09-11 | End: 2024-09-21

## 2024-09-11 RX ORDER — BENZONATATE 100 MG/1
100 CAPSULE ORAL 3 TIMES DAILY PRN
Qty: 30 CAPSULE | Refills: 0 | Status: SHIPPED | OUTPATIENT
Start: 2024-09-11 | End: 2024-09-21

## 2024-09-11 SDOH — ECONOMIC STABILITY: FOOD INSECURITY: WITHIN THE PAST 12 MONTHS, YOU WORRIED THAT YOUR FOOD WOULD RUN OUT BEFORE YOU GOT MONEY TO BUY MORE.: NEVER TRUE

## 2024-09-11 SDOH — ECONOMIC STABILITY: INCOME INSECURITY: HOW HARD IS IT FOR YOU TO PAY FOR THE VERY BASICS LIKE FOOD, HOUSING, MEDICAL CARE, AND HEATING?: NOT VERY HARD

## 2024-09-11 SDOH — ECONOMIC STABILITY: FOOD INSECURITY: WITHIN THE PAST 12 MONTHS, THE FOOD YOU BOUGHT JUST DIDN'T LAST AND YOU DIDN'T HAVE MONEY TO GET MORE.: NEVER TRUE

## 2024-09-11 ASSESSMENT — PATIENT HEALTH QUESTIONNAIRE - PHQ9
SUM OF ALL RESPONSES TO PHQ QUESTIONS 1-9: 0
SUM OF ALL RESPONSES TO PHQ9 QUESTIONS 1 & 2: 0
SUM OF ALL RESPONSES TO PHQ QUESTIONS 1-9: 0
2. FEELING DOWN, DEPRESSED OR HOPELESS: NOT AT ALL
1. LITTLE INTEREST OR PLEASURE IN DOING THINGS: NOT AT ALL

## 2024-09-18 ENCOUNTER — TELEPHONE (OUTPATIENT)
Dept: FAMILY MEDICINE CLINIC | Age: 89
End: 2024-09-18

## 2024-09-18 DIAGNOSIS — G25.0 TREMOR, ESSENTIAL: Primary | ICD-10-CM

## 2024-09-18 RX ORDER — PROPRANOLOL HCL 60 MG
60 CAPSULE, EXTENDED RELEASE 24HR ORAL DAILY
Qty: 90 CAPSULE | Refills: 0 | Status: CANCELLED | OUTPATIENT
Start: 2024-09-18

## 2024-09-18 RX ORDER — PROPRANOLOL HCL 20 MG
20 TABLET ORAL 3 TIMES DAILY
Qty: 270 TABLET | Refills: 0 | Status: SHIPPED | OUTPATIENT
Start: 2024-09-18 | End: 2024-12-17

## 2024-10-16 DIAGNOSIS — R41.3 MEMORY IMPAIRMENT: ICD-10-CM

## 2024-10-16 DIAGNOSIS — G30.9 DEMENTIA DUE TO ALZHEIMER'S DISEASE (HCC): ICD-10-CM

## 2024-10-16 DIAGNOSIS — F02.80 DEMENTIA DUE TO ALZHEIMER'S DISEASE (HCC): ICD-10-CM

## 2024-10-16 NOTE — TELEPHONE ENCOUNTER
Future Appointments   Date Time Provider Department Center   10/29/2024  9:20 AM Alec Andrew MD [facility] E[facility] DEP     LOV 9/11/2024

## 2024-10-17 RX ORDER — MEMANTINE HYDROCHLORIDE 10 MG/1
10 TABLET ORAL 2 TIMES DAILY
Qty: 180 TABLET | Refills: 0 | Status: SHIPPED | OUTPATIENT
Start: 2024-10-17

## 2024-10-29 ENCOUNTER — OFFICE VISIT (OUTPATIENT)
Dept: FAMILY MEDICINE CLINIC | Age: 89
End: 2024-10-29

## 2024-10-29 VITALS
TEMPERATURE: 97.3 F | HEART RATE: 60 BPM | OXYGEN SATURATION: 95 % | WEIGHT: 154 LBS | SYSTOLIC BLOOD PRESSURE: 120 MMHG | DIASTOLIC BLOOD PRESSURE: 60 MMHG | RESPIRATION RATE: 16 BRPM | BODY MASS INDEX: 26.77 KG/M2

## 2024-10-29 DIAGNOSIS — Z53.20 MAMMOGRAM DECLINED: ICD-10-CM

## 2024-10-29 DIAGNOSIS — M41.9 SCOLIOSIS, UNSPECIFIED SCOLIOSIS TYPE, UNSPECIFIED SPINAL REGION: ICD-10-CM

## 2024-10-29 DIAGNOSIS — E03.9 ACQUIRED HYPOTHYROIDISM: ICD-10-CM

## 2024-10-29 DIAGNOSIS — G25.0 TREMOR, ESSENTIAL: ICD-10-CM

## 2024-10-29 DIAGNOSIS — Z78.0 POSTMENOPAUSAL: ICD-10-CM

## 2024-10-29 DIAGNOSIS — G30.9 DEMENTIA DUE TO ALZHEIMER'S DISEASE (HCC): ICD-10-CM

## 2024-10-29 DIAGNOSIS — Z13.220 LIPID SCREENING: ICD-10-CM

## 2024-10-29 DIAGNOSIS — M89.9 DISORDER OF BONE, UNSPECIFIED: ICD-10-CM

## 2024-10-29 DIAGNOSIS — F02.80 DEMENTIA DUE TO ALZHEIMER'S DISEASE (HCC): ICD-10-CM

## 2024-10-29 DIAGNOSIS — R53.83 OTHER FATIGUE: ICD-10-CM

## 2024-10-29 DIAGNOSIS — Z00.00 MEDICARE ANNUAL WELLNESS VISIT, SUBSEQUENT: Primary | ICD-10-CM

## 2024-10-29 DIAGNOSIS — E78.00 HYPERCHOLESTEROLEMIA: ICD-10-CM

## 2024-10-29 DIAGNOSIS — I10 HTN (HYPERTENSION), BENIGN: ICD-10-CM

## 2024-10-29 DIAGNOSIS — Z28.21 IMMUNIZATION DECLINED: ICD-10-CM

## 2024-10-29 DIAGNOSIS — R41.3 MEMORY IMPAIRMENT: ICD-10-CM

## 2024-10-29 LAB
25(OH)D3 SERPL-MCNC: 25.8 NG/ML
ALBUMIN SERPL-MCNC: 4.1 G/DL (ref 3.4–5)
ALBUMIN/GLOB SERPL: 1.6 {RATIO} (ref 1.1–2.2)
ALP SERPL-CCNC: 85 U/L (ref 40–129)
ALT SERPL-CCNC: 15 U/L (ref 10–40)
ANION GAP SERPL CALCULATED.3IONS-SCNC: 10 MMOL/L (ref 3–16)
AST SERPL-CCNC: 21 U/L (ref 15–37)
BILIRUB SERPL-MCNC: 0.5 MG/DL (ref 0–1)
BILIRUBIN, POC: NEGATIVE
BLOOD URINE, POC: NORMAL
BUN SERPL-MCNC: 18 MG/DL (ref 7–20)
CALCIUM SERPL-MCNC: 8.9 MG/DL (ref 8.3–10.6)
CHLORIDE SERPL-SCNC: 95 MMOL/L (ref 99–110)
CHOLEST SERPL-MCNC: 177 MG/DL (ref 0–199)
CLARITY, POC: NORMAL
CO2 SERPL-SCNC: 29 MMOL/L (ref 21–32)
COLOR, POC: NORMAL
CREAT SERPL-MCNC: 0.8 MG/DL (ref 0.6–1.2)
DEPRECATED RDW RBC AUTO: 13.5 % (ref 12.4–15.4)
FOLATE SERPL-MCNC: 25.1 NG/ML (ref 4.78–24.2)
GFR SERPLBLD CREATININE-BSD FMLA CKD-EPI: 71 ML/MIN/{1.73_M2}
GLUCOSE SERPL-MCNC: 81 MG/DL (ref 70–99)
GLUCOSE URINE, POC: NEGATIVE MG/DL
HCT VFR BLD AUTO: 36.9 % (ref 36–48)
HDLC SERPL-MCNC: 63 MG/DL (ref 40–60)
HGB BLD-MCNC: 12.6 G/DL (ref 12–16)
KETONES, POC: NEGATIVE MG/DL
LDL CHOLESTEROL: 102 MG/DL
LEUKOCYTE EST, POC: NEGATIVE
MCH RBC QN AUTO: 33.2 PG (ref 26–34)
MCHC RBC AUTO-ENTMCNC: 34.1 G/DL (ref 31–36)
MCV RBC AUTO: 97.2 FL (ref 80–100)
NITRITE, POC: NEGATIVE
PH, POC: 5.5
PLATELET # BLD AUTO: 225 K/UL (ref 135–450)
PMV BLD AUTO: 8.2 FL (ref 5–10.5)
POTASSIUM SERPL-SCNC: 3.3 MMOL/L (ref 3.5–5.1)
PROT SERPL-MCNC: 6.7 G/DL (ref 6.4–8.2)
PROTEIN, POC: NEGATIVE MG/DL
RBC # BLD AUTO: 3.79 M/UL (ref 4–5.2)
SODIUM SERPL-SCNC: 134 MMOL/L (ref 136–145)
SPECIFIC GRAVITY, POC: 1.01
TRIGL SERPL-MCNC: 60 MG/DL (ref 0–150)
TSH SERPL DL<=0.005 MIU/L-ACNC: 0.42 UIU/ML (ref 0.27–4.2)
UROBILINOGEN, POC: NORMAL MG/DL
VIT B12 SERPL-MCNC: 2646 PG/ML (ref 211–911)
VLDLC SERPL CALC-MCNC: 12 MG/DL
WBC # BLD AUTO: 6.5 K/UL (ref 4–11)

## 2024-10-29 RX ORDER — RISPERIDONE 0.5 MG/1
0.5 TABLET ORAL DAILY
COMMUNITY
Start: 2024-09-18

## 2024-10-29 RX ORDER — PROPRANOLOL HCL 20 MG
20 TABLET ORAL 4 TIMES DAILY
Qty: 360 TABLET | Refills: 0 | Status: SHIPPED | OUTPATIENT
Start: 2024-10-29 | End: 2025-01-27

## 2024-10-29 RX ORDER — PRIMIDONE 50 MG/1
50 TABLET ORAL 2 TIMES DAILY
Qty: 270 TABLET | Refills: 3 | Status: SHIPPED | OUTPATIENT
Start: 2024-10-29

## 2024-10-29 SDOH — ECONOMIC STABILITY: FOOD INSECURITY: WITHIN THE PAST 12 MONTHS, THE FOOD YOU BOUGHT JUST DIDN'T LAST AND YOU DIDN'T HAVE MONEY TO GET MORE.: NEVER TRUE

## 2024-10-29 SDOH — ECONOMIC STABILITY: INCOME INSECURITY: HOW HARD IS IT FOR YOU TO PAY FOR THE VERY BASICS LIKE FOOD, HOUSING, MEDICAL CARE, AND HEATING?: NOT VERY HARD

## 2024-10-29 SDOH — ECONOMIC STABILITY: FOOD INSECURITY: WITHIN THE PAST 12 MONTHS, YOU WORRIED THAT YOUR FOOD WOULD RUN OUT BEFORE YOU GOT MONEY TO BUY MORE.: NEVER TRUE

## 2024-10-29 ASSESSMENT — PATIENT HEALTH QUESTIONNAIRE - PHQ9
SUM OF ALL RESPONSES TO PHQ QUESTIONS 1-9: 0
2. FEELING DOWN, DEPRESSED OR HOPELESS: NOT AT ALL
SUM OF ALL RESPONSES TO PHQ QUESTIONS 1-9: 0
SUM OF ALL RESPONSES TO PHQ QUESTIONS 1-9: 0
SUM OF ALL RESPONSES TO PHQ9 QUESTIONS 1 & 2: 0
1. LITTLE INTEREST OR PLEASURE IN DOING THINGS: NOT AT ALL
SUM OF ALL RESPONSES TO PHQ QUESTIONS 1-9: 0

## 2024-10-29 NOTE — PROGRESS NOTES
primidone (MYSOLINE) 50 MG tablet Take 1 tablet by mouth in the morning and at bedtime Yes Alec Andrew MD   propranolol (INDERAL) 20 MG tablet Take 1 tablet by mouth 4 times daily Yes Alec Andrew MD   memantine (NAMENDA) 10 MG tablet Take 1 tablet by mouth 2 times daily Yes Alec Andrew MD   levothyroxine (SYNTHROID) 125 MCG tablet TAKE 1 TABLET EVERY DAY Yes Bobbi Cho DO   pantoprazole (PROTONIX) 40 MG tablet TAKE 1 TABLET EVERY MORNING BEFORE BREAKFAST Yes Bobbi Cho DO   hydroCHLOROthiazide (MICROZIDE) 12.5 MG capsule TAKE 1 CAPSULE EVERY DAY Yes Bobbi Cho DO   cyanocobalamin 1000 MCG tablet Take 1 tablet by mouth daily Yes Veena Bray MD   fexofenadine (ALLEGRA) 180 MG tablet Take 1 tablet by mouth daily Yes Veena Bray MD   fluticasone (FLONASE) 50 MCG/ACT nasal spray USE 2 SPRAYS IN EACH NOSTRIL EVERY DAY Yes Bobbi Cho DO   Bioflavonoid Products (BIOFLEX PO) Take by mouth Yes Veena Bray MD   docusate sodium (COLACE) 100 MG capsule Take 1 capsule by mouth as needed Yes Veena Bray MD   ibuprofen (ADVIL;MOTRIN) 200 MG CAPS Take 1 capsule by mouth as needed Yes Veena Bray MD   Multiple Vitamin (MULTI-VITAMIN) TABS Take 1 tablet by mouth as needed. Yes ProviderVeena MD       CareTeam (Including outside providers/suppliers regularly involved in providing care):   Patient Care Team:  Alec Andrew MD as PCP - General (Family Medicine)  Alec Andrew MD as PCP - Empaneled Provider      Reviewed and updated this visit:  Tobacco  Allergies  Meds  Med Hx  Surg Hx  Soc Hx  Fam Hx          An electronic signature was used to authenticate this note.    --Alec Andrew MD on 10/29/2024

## 2024-10-29 NOTE — PATIENT INSTRUCTIONS
Bring in copy of Living will   Labs today     Health Maintenance Due   Topic Date Due    DTaP/Tdap/Td vaccine (1 - Tdap) Never done    Shingles vaccine (1 of 2) Never done    Respiratory Syncytial Virus (RSV) Pregnant or age 60 yrs+ (1 - 1-dose 60+ series) Never done    Flu vaccine (1) 08/01/2024    COVID-19 Vaccine (7 - 2023-24 season) 09/01/2024              Preventing Falls: Care Instructions      Injuries and health problems such as trouble walking or poor eyesight can increase your risk of falling. So can some medicines. But there are things you can do to help prevent falls. You can exercise to get stronger. You can also arrange your home to make it safer.    Talk to your doctor about the medicines you take. Ask if any of them increase the risk of falls and whether they can be changed or stopped.   Try to exercise regularly. It can help improve your strength and balance. This can help lower your risk of falling.         Practice fall safety and prevention.   Wear low-heeled shoes that fit well and give your feet good support. Talk to your doctor if you have foot problems that make this hard.  Carry a cellphone or wear a medical alert device that you can use to call for help.  Use stepladders instead of chairs to reach high objects. Don't climb if you're at risk for falls. Ask for help, if needed.  Wear the correct eyeglasses, if you need them.        Make your home safer.   Remove rugs, cords, clutter, and furniture from walkways.  Keep your house well lit. Use night-lights in hallways and bathrooms.  Install and use sturdy handrails on stairways.  Wear nonskid footwear, even inside. Don't walk barefoot or in socks without shoes.        Be safe outside.   Use handrails, curb cuts, and ramps whenever possible.  Keep your hands free by using a shoulder bag or backpack.  Try to walk in well-lit areas. Watch out for uneven ground, changes in pavement, and debris.  Be careful in the winter. Walk on the grass or

## 2024-10-31 DIAGNOSIS — E87.6 HYPOKALEMIA: ICD-10-CM

## 2024-10-31 DIAGNOSIS — R31.29 MICROSCOPIC HEMATURIA: Primary | ICD-10-CM

## 2024-11-12 ENCOUNTER — TELEPHONE (OUTPATIENT)
Dept: FAMILY MEDICINE CLINIC | Age: 89
End: 2024-11-12

## 2024-11-12 ENCOUNTER — NURSE ONLY (OUTPATIENT)
Dept: FAMILY MEDICINE CLINIC | Age: 89
End: 2024-11-12
Payer: MEDICARE

## 2024-11-12 DIAGNOSIS — E87.6 HYPOKALEMIA: ICD-10-CM

## 2024-11-12 DIAGNOSIS — R39.9 URINARY TRACT INFECTION SYMPTOMS: Primary | ICD-10-CM

## 2024-11-12 LAB
ANION GAP SERPL CALCULATED.3IONS-SCNC: 11 MMOL/L (ref 3–16)
BILIRUBIN, POC: NORMAL
BLOOD URINE, POC: NEGATIVE
BUN SERPL-MCNC: 10 MG/DL (ref 7–20)
CALCIUM SERPL-MCNC: 9.2 MG/DL (ref 8.3–10.6)
CHLORIDE SERPL-SCNC: 87 MMOL/L (ref 99–110)
CLARITY, POC: NORMAL
CO2 SERPL-SCNC: 28 MMOL/L (ref 21–32)
COLOR, POC: NORMAL
CREAT SERPL-MCNC: 0.8 MG/DL (ref 0.6–1.2)
GFR SERPLBLD CREATININE-BSD FMLA CKD-EPI: 71 ML/MIN/{1.73_M2}
GLUCOSE SERPL-MCNC: 115 MG/DL (ref 70–99)
GLUCOSE URINE, POC: NEGATIVE MG/DL
KETONES, POC: NEGATIVE MG/DL
LEUKOCYTE EST, POC: NORMAL
NITRITE, POC: NEGATIVE
PH, POC: 7
POTASSIUM SERPL-SCNC: 3.2 MMOL/L (ref 3.5–5.1)
PROTEIN, POC: NEGATIVE MG/DL
SODIUM SERPL-SCNC: 126 MMOL/L (ref 136–145)
SPECIFIC GRAVITY, POC: 1.01
UROBILINOGEN, POC: 1 MG/DL

## 2024-11-12 PROCEDURE — 81003 URINALYSIS AUTO W/O SCOPE: CPT | Performed by: STUDENT IN AN ORGANIZED HEALTH CARE EDUCATION/TRAINING PROGRAM

## 2024-11-12 NOTE — TELEPHONE ENCOUNTER
Daughter called. Patient here 10/29/2024      Her mom complained of pain and pressure only once yesterday.   Should they do a repeat UA when she comes to do her repeat lab of of BMP?    Please advise she wanted to bring her today

## 2024-11-13 DIAGNOSIS — E87.6 HYPOKALEMIA: Primary | ICD-10-CM

## 2024-11-13 LAB — BACTERIA UR CULT: NORMAL

## 2024-11-13 RX ORDER — POTASSIUM CHLORIDE 750 MG/1
10 TABLET, EXTENDED RELEASE ORAL DAILY
Qty: 30 TABLET | Refills: 0 | Status: SHIPPED | OUTPATIENT
Start: 2024-11-13

## 2024-11-26 ENCOUNTER — TELEPHONE (OUTPATIENT)
Dept: FAMILY MEDICINE CLINIC | Age: 89
End: 2024-11-26

## 2024-11-26 NOTE — TELEPHONE ENCOUNTER
Maribel Francois called pt will be transitioning for independent living    Faxed Med list and patient summary to [facility] [phone]  Fax confirmation received

## 2024-12-05 DIAGNOSIS — E87.6 HYPOKALEMIA: ICD-10-CM

## 2024-12-05 NOTE — TELEPHONE ENCOUNTER
Future Appointments   Date Time Provider Department Center   12/9/2024  9:40 AM Alec Andrew MD [facility] E[facility] DEP           Lov 10/29/2024

## 2024-12-06 RX ORDER — HYDROCHLOROTHIAZIDE 12.5 MG/1
12.5 CAPSULE ORAL DAILY
Qty: 90 CAPSULE | Refills: 10 | Status: SHIPPED | OUTPATIENT
Start: 2024-12-06 | End: 2024-12-06 | Stop reason: ALTCHOICE

## 2024-12-06 RX ORDER — POTASSIUM CHLORIDE 750 MG/1
10 TABLET, EXTENDED RELEASE ORAL DAILY
Qty: 90 TABLET | Refills: 1 | Status: ON HOLD | OUTPATIENT
Start: 2024-12-06

## 2024-12-06 NOTE — TELEPHONE ENCOUNTER
Called [facility] pharmacy and spoke with Pharmacists, discontinued the hydrochlorothiazide.     Called and spoke with patient and made her aware that she needs to have repeat labs. She states she will try to go next week when she can get transportation.

## 2024-12-06 NOTE — TELEPHONE ENCOUNTER
Please call pharmacy and confirm that hydrochlorothiazide was discontinued.  And have patient come in to repeat her potassium labs

## 2024-12-07 ENCOUNTER — APPOINTMENT (OUTPATIENT)
Dept: CT IMAGING | Age: 89
DRG: 641 | End: 2024-12-07
Payer: MEDICARE

## 2024-12-07 ENCOUNTER — HOSPITAL ENCOUNTER (INPATIENT)
Age: 89
LOS: 5 days | Discharge: HOME | DRG: 641 | End: 2024-12-12
Attending: EMERGENCY MEDICINE | Admitting: INTERNAL MEDICINE
Payer: MEDICARE

## 2024-12-07 ENCOUNTER — APPOINTMENT (OUTPATIENT)
Dept: GENERAL RADIOLOGY | Age: 89
DRG: 641 | End: 2024-12-07
Payer: MEDICARE

## 2024-12-07 DIAGNOSIS — E87.1 HYPONATREMIA: Primary | ICD-10-CM
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
## 2024-12-07 DIAGNOSIS — Z86.59 HISTORY OF DEMENTIA: ICD-10-CM

## 2024-12-07 DIAGNOSIS — R29.898 WEAKNESS OF BOTH LEGS: ICD-10-CM

## 2024-12-07 DIAGNOSIS — Z71.89 GOALS OF CARE, COUNSELING/DISCUSSION: ICD-10-CM

## 2024-12-07 LAB
ALBUMIN SERPL-MCNC: 3.7 G/DL (ref 3.4–5)
ALBUMIN/GLOB SERPL: 1.4 {RATIO} (ref 1.1–2.2)
ALP SERPL-CCNC: 65 U/L (ref 40–129)
ALT SERPL-CCNC: 19 U/L (ref 10–40)
ANION GAP SERPL CALCULATED.3IONS-SCNC: 11 MMOL/L (ref 3–16)
ANION GAP SERPL CALCULATED.3IONS-SCNC: 11 MMOL/L (ref 3–16)
AST SERPL-CCNC: 32 U/L (ref 15–37)
BACTERIA URNS QL MICRO: ABNORMAL /HPF
BASOPHILS # BLD: 0.1 K/UL (ref 0–0.2)
BASOPHILS NFR BLD: 0.9 %
BILIRUB SERPL-MCNC: 0.5 MG/DL (ref 0–1)
BILIRUB UR QL STRIP.AUTO: NEGATIVE
BILIRUB UR QL STRIP.AUTO: NEGATIVE
BUN SERPL-MCNC: 12 MG/DL (ref 7–20)
BUN SERPL-MCNC: 14 MG/DL (ref 7–20)
CALCIUM SERPL-MCNC: 8.8 MG/DL (ref 8.3–10.6)
CALCIUM SERPL-MCNC: 8.9 MG/DL (ref 8.3–10.6)
CHLORIDE SERPL-SCNC: 87 MMOL/L (ref 99–110)
CHLORIDE SERPL-SCNC: 87 MMOL/L (ref 99–110)
CLARITY UR: CLEAR
CLARITY UR: CLEAR
CO2 SERPL-SCNC: 24 MMOL/L (ref 21–32)
CO2 SERPL-SCNC: 26 MMOL/L (ref 21–32)
COLOR UR: ABNORMAL
COLOR UR: YELLOW
CREAT SERPL-MCNC: 0.6 MG/DL (ref 0.6–1.2)
CREAT SERPL-MCNC: 0.7 MG/DL (ref 0.6–1.2)
CREAT UR-MCNC: 21.8 MG/DL (ref 28–259)
DEPRECATED RDW RBC AUTO: 13.2 % (ref 12.4–15.4)
EOSINOPHIL # BLD: 0.2 K/UL (ref 0–0.6)
EOSINOPHIL NFR BLD: 1.9 %
EPI CELLS #/AREA URNS HPF: ABNORMAL /HPF (ref 0–5)
EPI CELLS #/AREA URNS HPF: ABNORMAL /HPF (ref 0–5)
GFR SERPLBLD CREATININE-BSD FMLA CKD-EPI: 83 ML/MIN/{1.73_M2}
GFR SERPLBLD CREATININE-BSD FMLA CKD-EPI: 86 ML/MIN/{1.73_M2}
GLUCOSE SERPL-MCNC: 101 MG/DL (ref 70–99)
GLUCOSE SERPL-MCNC: 98 MG/DL (ref 70–99)
GLUCOSE UR STRIP.AUTO-MCNC: NEGATIVE MG/DL
GLUCOSE UR STRIP.AUTO-MCNC: NEGATIVE MG/DL
HCT VFR BLD AUTO: 34.1 % (ref 36–48)
HGB BLD-MCNC: 11.3 G/DL (ref 12–16)
HGB UR QL STRIP.AUTO: NEGATIVE
HGB UR QL STRIP.AUTO: NEGATIVE
KETONES UR STRIP.AUTO-MCNC: ABNORMAL MG/DL
KETONES UR STRIP.AUTO-MCNC: ABNORMAL MG/DL
LEUKOCYTE ESTERASE UR QL STRIP.AUTO: ABNORMAL
LEUKOCYTE ESTERASE UR QL STRIP.AUTO: NEGATIVE
LYMPHOCYTES # BLD: 1.9 K/UL (ref 1–5.1)
LYMPHOCYTES NFR BLD: 23.1 %
MAGNESIUM SERPL-MCNC: 1.44 MG/DL (ref 1.8–2.4)
MCH RBC QN AUTO: 32.4 PG (ref 26–34)
MCHC RBC AUTO-ENTMCNC: 33.2 G/DL (ref 31–36)
MCV RBC AUTO: 97.8 FL (ref 80–100)
MONOCYTES # BLD: 1.5 K/UL (ref 0–1.3)
MONOCYTES NFR BLD: 17.5 %
NEUTROPHILS # BLD: 4.7 K/UL (ref 1.7–7.7)
NEUTROPHILS NFR BLD: 56.6 %
NITRITE UR QL STRIP.AUTO: NEGATIVE
NITRITE UR QL STRIP.AUTO: NEGATIVE
PH UR STRIP.AUTO: 6.5 [PH] (ref 5–8)
PH UR STRIP.AUTO: 7 [PH] (ref 5–8)
PLATELET # BLD AUTO: 191 K/UL (ref 135–450)
PMV BLD AUTO: 8.1 FL (ref 5–10.5)
POTASSIUM SERPL-SCNC: 3.4 MMOL/L (ref 3.5–5.1)
POTASSIUM SERPL-SCNC: 3.6 MMOL/L (ref 3.5–5.1)
POTASSIUM SERPL-SCNC: 3.7 MMOL/L (ref 3.5–5.1)
PROT SERPL-MCNC: 6.4 G/DL (ref 6.4–8.2)
PROT UR STRIP.AUTO-MCNC: NEGATIVE MG/DL
PROT UR STRIP.AUTO-MCNC: NEGATIVE MG/DL
RBC # BLD AUTO: 3.49 M/UL (ref 4–5.2)
RBC #/AREA URNS HPF: ABNORMAL /HPF (ref 0–4)
RBC #/AREA URNS HPF: ABNORMAL /HPF (ref 0–4)
REASON FOR REJECTION: NORMAL
REJECTED TEST: NORMAL
SODIUM SERPL-SCNC: 122 MMOL/L (ref 136–145)
SODIUM SERPL-SCNC: 124 MMOL/L (ref 136–145)
SODIUM UR-SCNC: 84 MMOL/L
SP GR UR STRIP.AUTO: 1.01 (ref 1–1.03)
SP GR UR STRIP.AUTO: 1.01 (ref 1–1.03)
UA COMPLETE W REFLEX CULTURE PNL UR: ABNORMAL
UA DIPSTICK W REFLEX MICRO PNL UR: ABNORMAL
UA DIPSTICK W REFLEX MICRO PNL UR: YES
URN SPEC COLLECT METH UR: ABNORMAL
URN SPEC COLLECT METH UR: ABNORMAL
UROBILINOGEN UR STRIP-ACNC: 0.2 E.U./DL
UROBILINOGEN UR STRIP-ACNC: 0.2 E.U./DL
WBC # BLD AUTO: 8.4 K/UL (ref 4–11)
WBC #/AREA URNS HPF: ABNORMAL /HPF (ref 0–5)
WBC #/AREA URNS HPF: ABNORMAL /HPF (ref 0–5)

## 2024-12-07 PROCEDURE — 83930 ASSAY OF BLOOD OSMOLALITY: CPT

## 2024-12-07 PROCEDURE — 83935 ASSAY OF URINE OSMOLALITY: CPT

## 2024-12-07 PROCEDURE — 2580000003 HC RX 258: Performed by: INTERNAL MEDICINE

## 2024-12-07 PROCEDURE — 36415 COLL VENOUS BLD VENIPUNCTURE: CPT

## 2024-12-07 PROCEDURE — 6370000000 HC RX 637 (ALT 250 FOR IP): Performed by: PHYSICIAN ASSISTANT

## 2024-12-07 PROCEDURE — 80053 COMPREHEN METABOLIC PANEL: CPT

## 2024-12-07 PROCEDURE — 84132 ASSAY OF SERUM POTASSIUM: CPT

## 2024-12-07 PROCEDURE — 71045 X-RAY EXAM CHEST 1 VIEW: CPT

## 2024-12-07 PROCEDURE — 2060000000 HC ICU INTERMEDIATE R&B

## 2024-12-07 PROCEDURE — 81001 URINALYSIS AUTO W/SCOPE: CPT

## 2024-12-07 PROCEDURE — 84300 ASSAY OF URINE SODIUM: CPT

## 2024-12-07 PROCEDURE — 6370000000 HC RX 637 (ALT 250 FOR IP): Performed by: INTERNAL MEDICINE

## 2024-12-07 PROCEDURE — 99285 EMERGENCY DEPT VISIT HI MDM: CPT

## 2024-12-07 PROCEDURE — 72192 CT PELVIS W/O DYE: CPT

## 2024-12-07 PROCEDURE — 70450 CT HEAD/BRAIN W/O DYE: CPT

## 2024-12-07 PROCEDURE — 85025 COMPLETE CBC W/AUTO DIFF WBC: CPT

## 2024-12-07 PROCEDURE — 82570 ASSAY OF URINE CREATININE: CPT

## 2024-12-07 PROCEDURE — 83735 ASSAY OF MAGNESIUM: CPT

## 2024-12-07 RX ORDER — MEMANTINE HYDROCHLORIDE 5 MG/1
10 TABLET ORAL 2 TIMES DAILY
Status: DISCONTINUED | OUTPATIENT
Start: 2024-12-07 | End: 2024-12-12 | Stop reason: HOSPADM

## 2024-12-07 RX ORDER — ONDANSETRON 4 MG/1
4 TABLET, ORALLY DISINTEGRATING ORAL EVERY 8 HOURS PRN
Status: DISCONTINUED | OUTPATIENT
Start: 2024-12-07 | End: 2024-12-12 | Stop reason: HOSPADM

## 2024-12-07 RX ORDER — SODIUM CHLORIDE 9 MG/ML
INJECTION, SOLUTION INTRAVENOUS PRN
Status: DISCONTINUED | OUTPATIENT
Start: 2024-12-07 | End: 2024-12-12 | Stop reason: HOSPADM

## 2024-12-07 RX ORDER — POTASSIUM CHLORIDE 7.45 MG/ML
10 INJECTION INTRAVENOUS PRN
Status: DISCONTINUED | OUTPATIENT
Start: 2024-12-07 | End: 2024-12-11

## 2024-12-07 RX ORDER — PROPRANOLOL HYDROCHLORIDE 10 MG/1
20 TABLET ORAL ONCE
Status: COMPLETED | OUTPATIENT
Start: 2024-12-07 | End: 2024-12-07

## 2024-12-07 RX ORDER — POTASSIUM CHLORIDE 1500 MG/1
40 TABLET, EXTENDED RELEASE ORAL PRN
Status: DISCONTINUED | OUTPATIENT
Start: 2024-12-07 | End: 2024-12-11

## 2024-12-07 RX ORDER — PRIMIDONE 50 MG/1
50 TABLET ORAL 2 TIMES DAILY
Status: DISCONTINUED | OUTPATIENT
Start: 2024-12-07 | End: 2024-12-12 | Stop reason: HOSPADM

## 2024-12-07 RX ORDER — LEVOTHYROXINE SODIUM 125 UG/1
125 TABLET ORAL
Status: DISCONTINUED | OUTPATIENT
Start: 2024-12-08 | End: 2024-12-12 | Stop reason: HOSPADM

## 2024-12-07 RX ORDER — POLYETHYLENE GLYCOL 3350 17 G/17G
17 POWDER, FOR SOLUTION ORAL DAILY PRN
Status: DISCONTINUED | OUTPATIENT
Start: 2024-12-07 | End: 2024-12-12 | Stop reason: HOSPADM

## 2024-12-07 RX ORDER — PROPRANOLOL HYDROCHLORIDE 10 MG/1
20 TABLET ORAL 4 TIMES DAILY
Status: DISCONTINUED | OUTPATIENT
Start: 2024-12-07 | End: 2024-12-12 | Stop reason: HOSPADM

## 2024-12-07 RX ORDER — MAGNESIUM SULFATE IN WATER 40 MG/ML
2000 INJECTION, SOLUTION INTRAVENOUS PRN
Status: DISCONTINUED | OUTPATIENT
Start: 2024-12-07 | End: 2024-12-11

## 2024-12-07 RX ORDER — ONDANSETRON 2 MG/ML
4 INJECTION INTRAMUSCULAR; INTRAVENOUS EVERY 6 HOURS PRN
Status: DISCONTINUED | OUTPATIENT
Start: 2024-12-07 | End: 2024-12-12 | Stop reason: HOSPADM

## 2024-12-07 RX ORDER — CETIRIZINE HYDROCHLORIDE 10 MG/1
10 TABLET ORAL DAILY
COMMUNITY

## 2024-12-07 RX ORDER — SODIUM CHLORIDE 0.9 % (FLUSH) 0.9 %
5-40 SYRINGE (ML) INJECTION PRN
Status: DISCONTINUED | OUTPATIENT
Start: 2024-12-07 | End: 2024-12-12 | Stop reason: HOSPADM

## 2024-12-07 RX ORDER — PANTOPRAZOLE SODIUM 40 MG/1
40 TABLET, DELAYED RELEASE ORAL
Status: DISCONTINUED | OUTPATIENT
Start: 2024-12-08 | End: 2024-12-12 | Stop reason: HOSPADM

## 2024-12-07 RX ORDER — POTASSIUM CHLORIDE 750 MG/1
10 TABLET, EXTENDED RELEASE ORAL DAILY
Status: DISCONTINUED | OUTPATIENT
Start: 2024-12-07 | End: 2024-12-10

## 2024-12-07 RX ORDER — HYDROXYZINE HYDROCHLORIDE 10 MG/1
10 TABLET, FILM COATED ORAL 2 TIMES DAILY PRN
Status: DISCONTINUED | OUTPATIENT
Start: 2024-12-07 | End: 2024-12-10

## 2024-12-07 RX ORDER — RISPERIDONE 0.25 MG/1
0.5 TABLET ORAL ONCE
Status: COMPLETED | OUTPATIENT
Start: 2024-12-07 | End: 2024-12-07

## 2024-12-07 RX ORDER — ENOXAPARIN SODIUM 100 MG/ML
40 INJECTION SUBCUTANEOUS DAILY
Status: DISCONTINUED | OUTPATIENT
Start: 2024-12-08 | End: 2024-12-12 | Stop reason: HOSPADM

## 2024-12-07 RX ORDER — SODIUM CHLORIDE 0.9 % (FLUSH) 0.9 %
5-40 SYRINGE (ML) INJECTION EVERY 12 HOURS SCHEDULED
Status: DISCONTINUED | OUTPATIENT
Start: 2024-12-07 | End: 2024-12-12 | Stop reason: HOSPADM

## 2024-12-07 RX ORDER — RISPERIDONE 0.25 MG/1
0.5 TABLET ORAL DAILY
Status: DISCONTINUED | OUTPATIENT
Start: 2024-12-08 | End: 2024-12-12 | Stop reason: HOSPADM

## 2024-12-07 RX ADMIN — RISPERIDONE 0.5 MG: 0.25 TABLET, FILM COATED ORAL at 15:44

## 2024-12-07 RX ADMIN — MEMANTINE 10 MG: 5 TABLET ORAL at 20:26

## 2024-12-07 RX ADMIN — PROPRANOLOL HYDROCHLORIDE 20 MG: 10 TABLET ORAL at 15:44

## 2024-12-07 RX ADMIN — VASOPRESSIN: 20 INJECTION, SOLUTION INTRAVENOUS at 18:27

## 2024-12-07 RX ADMIN — POTASSIUM CHLORIDE 10 MEQ: 750 TABLET, EXTENDED RELEASE ORAL at 20:26

## 2024-12-07 RX ADMIN — HYDROXYZINE HYDROCHLORIDE 10 MG: 10 TABLET ORAL at 20:26

## 2024-12-07 RX ADMIN — PRIMIDONE 50 MG: 50 TABLET ORAL at 20:26

## 2024-12-07 RX ADMIN — PROPRANOLOL HYDROCHLORIDE 20 MG: 10 TABLET ORAL at 20:26

## 2024-12-07 ASSESSMENT — PAIN - FUNCTIONAL ASSESSMENT: PAIN_FUNCTIONAL_ASSESSMENT: 0-10

## 2024-12-07 ASSESSMENT — LIFESTYLE VARIABLES
HOW OFTEN DO YOU HAVE A DRINK CONTAINING ALCOHOL: NEVER
HOW MANY STANDARD DRINKS CONTAINING ALCOHOL DO YOU HAVE ON A TYPICAL DAY: PATIENT DOES NOT DRINK

## 2024-12-07 ASSESSMENT — PAIN SCALES - GENERAL
PAINLEVEL_OUTOF10: 0
PAINLEVEL_OUTOF10: 0
PAINLEVEL_OUTOF10: 8

## 2024-12-07 NOTE — ED PROVIDER NOTES
[facility]  ED  EMERGENCY DEPARTMENT ENCOUNTER      Pt Name: Radha Magallon  MRN: [id number]  Birthdate 1935  Date of evaluation: 12/7/2024  Provider: Ghislaine Martell MD  PCP: Alec Andrew MD  Note Started: 2:09 PM EST 12/7/24    ED Attending Attestation Note     CHIEF COMPLAINT       Chief Complaint   Patient presents with    Fall     Fall from standing position. Unknown how fall occurred or if patient hit head. Patient reports pain all over and anywhere touched. Complaining of pain in legs due to restless leg syndrome. Pt lives at [facility]. Daughter was there this morning, but did not witness fall. Per daughter, patient will be moving to assisted living. Hx of dementia, pt only alert to self. Took advil at 11am. Per daughter, no LOC. Per daughter, patient was complaining of hip pain after fall.     EMERGENCY DEPARTMENT COURSE and DIFFERENTIAL DIAGNOSIS/MDM:      This patient was seen by the advance practice provider.  In addition to the EZ I personally saw Radha Magallon and made/approved the management plan. I take responsibility for the patient management.     Briefly, this is a 89 y.o. female who presents to the emergency department with her daughter secondary to concern for a fall.  The daughter believes she fell from a standing position; However she does not know how she fell or if she hit her head.  No known loss of consciousness.    The patient here endorses that she is having pain everywhere and especially in her legs.  She does have a history of RLS.  She lives currently in independent living.  The family is concerned about cognitive issues with the patient however they feel the patient does not know or if they talk about it she gets agitated so they tried not to discussed those kinds of things in front of her.  They do have plans to have her put in a new location next week potentially.    On my assessment the patient is awake and alert.  She can tell me her

## 2024-12-07 NOTE — ED NOTES
Radha Magallon is a 89 y.o. female admitted for  Principal Problem:    Hyponatremia  Resolved Problems:    * No resolved hospital problems. *  .   Patient Assisted Living via EMS transportation with   Chief Complaint   Patient presents with    Fall     Fall from standing position. Unknown how fall occurred or if patient hit head. Patient reports pain all over and anywhere touched. Complaining of pain in legs due to restless leg syndrome. Pt lives at [facility]. Daughter was there this morning, but did not witness fall. Per daughter, patient will be moving to assisted living. Hx of dementia, pt only alert to self. Took advil at 11am. Per daughter, no LOC. Per daughter, patient was complaining of hip pain after fall.   .  Patient is alert and Person  Patient's baseline mobility: Baseline Mobility: Walker  Code Status: No Order   Cardiac Rhythm:       Is patient on baseline Oxygen: no how many Liters:   Abnormal Assessment Findings: sodium 122    Isolation: None      NIH Score:    C-SSRS: Risk of Suicide: No Risk  Bedside swallow:        Active LDA's:    Patient admitted with a dye: no If the dye is chronic was it exchanged:NA  Reason for dye:   Patient admitted with Central Line:  . PICC line placement confirmed: YES OR NO:[id number]}   Reason for Central line:   Was central line Inserted from an outside facility:        Family/Caregiver Present no Any Concerns: no   Restraints no  Sitter no         Vitals: MEWS Score: 1    Vitals:    12/07/24 1243 12/07/24 1313 12/07/24 1453 12/07/24 1556   BP: (!) 150/76 (!) 157/85 (!) 162/69 (!) 132/94   Pulse: 63 71 75 65   Resp: 21 21 18 23   Temp:       TempSrc:       SpO2: 96% 95% 92% 95%   Weight:       Height:           Last documented pain score (0-10 scale) Pain Level: 8  Pain medication administered No.    Pertinent or High Risk Medications/Drips: No.    Pending Blood Product Administration: no    Abnormal labs:   Abnormal Labs Reviewed   CBC WITH AUTO

## 2024-12-07 NOTE — CONSULTS
[facility] Consult Note           Reason for Consult:  Hyponatremia  Requesting Physician:  Dr. Stokes    Chief Complaint:  Fall  History Obtained From:  patient, electronic medical record    History of Present Ilness:    89 year old female with Dementia. HTN, Essential tremor who presents with fall.  We have been asked to assist in further mgmt of her Hyponatremia.    SNa 122 on admission, prior 126-134 from Oct/Nov 2024, wnl prior to that from 2023 readings.  Taken off hctz recently due to low Na and K levels, but per report has been restarted to help with LE edema.  Intake adequate, +weak, no n/v/d, no acute confusion.    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     Current Outpatient Medications on File Prior to Encounter   Medication Sig Dispense Refill    KLOR-CON M10 10 MEQ extended release tablet TAKE 1 TABLET BY MOUTH EVERY DAY 90 tablet 1    risperiDONE (RISPERDAL) 0.5 MG tablet Take 1 tablet by mouth daily      sertraline (ZOLOFT) 50 MG tablet Take 1 tablet by mouth daily      primidone (MYSOLINE) 50 MG tablet Take 1 tablet by mouth in the morning and at bedtime 270 tablet 3    propranolol (INDERAL) 20 MG tablet Take 1 tablet by mouth 4 times daily 360 tablet 0    memantine (NAMENDA) 10 MG tablet Take 1 tablet by

## 2024-12-07 NOTE — ED PROVIDER NOTES
[facility]  ED  EMERGENCY DEPARTMENT ENCOUNTER        Pt Name: Radha Magallon  MRN: [id number]  Birthdate 1935  Date of evaluation: 12/7/2024  Provider: FORTUNATO PAULA PA-C  PCP: Alec Andrew MD  ED Attending: MD Jayshree       I have seen and evaluated this patient with my supervising physician Ghislaine Martell MD.    CHIEF COMPLAINT:     Chief Complaint   Patient presents with    Fall     Fall from standing position. Unknown how fall occurred or if patient hit head. Patient reports pain all over and anywhere touched. Complaining of pain in legs due to restless leg syndrome. Pt lives at [facility]. Daughter was there this morning, but did not witness fall. Per daughter, patient will be moving to assisted living. Hx of dementia, pt only alert to self. Took advil at 11am. Per daughter, no LOC. Per daughter, patient was complaining of hip pain after fall.       HISTORY OF PRESENT ILLNESS:      History provided by the patient and her daughter.  Limitations: Patient has dementia.    Radha Magallon is a 89 y.o. female who arrives to the ED by EMS.  Patient lives at [facility].  The patient's daughter was at the facility today.  Daughter states the patient was in the bathroom sitting on the seat of her rolling walker, brushing her teeth.  The patient's daughter stepped out for a minute and then heard her mom cry out.  She went into the bathroom and saw her mom had fallen off of her seat onto the floor.  She complained of hip pain.  It was unclear if she had hit her head but she did not seem to complain of any other symptoms.  The patient's daughter called for help and EMS ultimately came to transport her.  Over the last several days the patient has been complaining of restless legs and weakness in her legs.  She has had trouble going from a sitting to a standing position.  A few weeks ago patient was evaluated by PCP with lab work that revealed hyponatremia

## 2024-12-07 NOTE — H&P
1 tablet by mouth 4 times daily 10/29/24 1/27/25  Alec Andrew MD   memantine (NAMENDA) 10 MG tablet Take 1 tablet by mouth 2 times daily 10/17/24   Alec Andrew MD   levothyroxine (SYNTHROID) 125 MCG tablet TAKE 1 TABLET EVERY DAY 6/14/24   Bobbi Cho DO   pantoprazole (PROTONIX) 40 MG tablet TAKE 1 TABLET EVERY MORNING BEFORE BREAKFAST 6/14/24   Bobbi Cho DO   cyanocobalamin 1000 MCG tablet Take 1 tablet by mouth daily    Veena Bray MD   fexofenadine (ALLEGRA) 180 MG tablet Take 1 tablet by mouth daily    Veena Bray MD   fluticasone (FLONASE) 50 MCG/ACT nasal spray USE 2 SPRAYS IN EACH NOSTRIL EVERY DAY 7/21/21   Bobbi Cho DO   Bioflavonoid Products (BIOFLEX PO) Take by mouth    Veena Bray MD   docusate sodium (COLACE) 100 MG capsule Take 1 capsule by mouth as needed 1/7/11   Veena Bray MD   ibuprofen (ADVIL;MOTRIN) 200 MG CAPS Take 1 capsule by mouth as needed 1/7/11   Veena Bray MD   Multiple Vitamin (MULTI-VITAMIN) TABS Take 1 tablet by mouth as needed. 1/7/11   Veena Bray MD       Labs: Personally reviewed and interpreted for clinical significance.   Recent Labs     12/07/24  1259   WBC 8.4   HGB 11.3*   HCT 34.1*        Recent Labs     12/07/24  1259 12/07/24  1553   *  --    K 3.7 3.6   CL 87*  --    CO2 24  --    BUN 14  --    CREATININE 0.7  --    CALCIUM 8.8  --      No results for input(s): \"PROBNP\", \"TROPHS\" in the last 72 hours.  No results for input(s): \"LABA1C\" in the last 72 hours.  Recent Labs     12/07/24  1259   AST 32   ALT 19   BILITOT 0.5   ALKPHOS 65     No results for input(s): \"INR\", \"LACTA\", \"TSH\" in the last 72 hours.     Dean Stokes MD

## 2024-12-08 LAB
ANION GAP SERPL CALCULATED.3IONS-SCNC: 10 MMOL/L (ref 3–16)
ANION GAP SERPL CALCULATED.3IONS-SCNC: 12 MMOL/L (ref 3–16)
BASOPHILS # BLD: 0.1 K/UL (ref 0–0.2)
BASOPHILS NFR BLD: 1 %
BUN SERPL-MCNC: 10 MG/DL (ref 7–20)
BUN SERPL-MCNC: 13 MG/DL (ref 7–20)
CALCIUM SERPL-MCNC: 8.2 MG/DL (ref 8.3–10.6)
CALCIUM SERPL-MCNC: 8.4 MG/DL (ref 8.3–10.6)
CHLORIDE SERPL-SCNC: 93 MMOL/L (ref 99–110)
CHLORIDE SERPL-SCNC: 94 MMOL/L (ref 99–110)
CO2 SERPL-SCNC: 23 MMOL/L (ref 21–32)
CO2 SERPL-SCNC: 25 MMOL/L (ref 21–32)
CREAT SERPL-MCNC: 0.6 MG/DL (ref 0.6–1.2)
CREAT SERPL-MCNC: 0.7 MG/DL (ref 0.6–1.2)
DEPRECATED RDW RBC AUTO: 13.2 % (ref 12.4–15.4)
EOSINOPHIL # BLD: 0.2 K/UL (ref 0–0.6)
EOSINOPHIL NFR BLD: 3.4 %
GFR SERPLBLD CREATININE-BSD FMLA CKD-EPI: 83 ML/MIN/{1.73_M2}
GFR SERPLBLD CREATININE-BSD FMLA CKD-EPI: 86 ML/MIN/{1.73_M2}
GLUCOSE SERPL-MCNC: 91 MG/DL (ref 70–99)
GLUCOSE SERPL-MCNC: 92 MG/DL (ref 70–99)
HCT VFR BLD AUTO: 32.6 % (ref 36–48)
HGB BLD-MCNC: 11 G/DL (ref 12–16)
LYMPHOCYTES # BLD: 1.3 K/UL (ref 1–5.1)
LYMPHOCYTES NFR BLD: 22.5 %
MAGNESIUM SERPL-MCNC: 1.45 MG/DL (ref 1.8–2.4)
MAGNESIUM SERPL-MCNC: 2.2 MG/DL (ref 1.8–2.4)
MCH RBC QN AUTO: 32.5 PG (ref 26–34)
MCHC RBC AUTO-ENTMCNC: 33.8 G/DL (ref 31–36)
MCV RBC AUTO: 96.2 FL (ref 80–100)
MONOCYTES # BLD: 1 K/UL (ref 0–1.3)
MONOCYTES NFR BLD: 17.1 %
NEUTROPHILS # BLD: 3.3 K/UL (ref 1.7–7.7)
NEUTROPHILS NFR BLD: 56 %
OSMOLALITY SERPL: 268 MOSM/KG (ref 280–301)
OSMOLALITY UR: 293 MOSM/KG (ref 390–1070)
PLATELET # BLD AUTO: 211 K/UL (ref 135–450)
PMV BLD AUTO: 7.3 FL (ref 5–10.5)
POTASSIUM SERPL-SCNC: 3.5 MMOL/L (ref 3.5–5.1)
POTASSIUM SERPL-SCNC: 3.7 MMOL/L (ref 3.5–5.1)
RBC # BLD AUTO: 3.39 M/UL (ref 4–5.2)
SODIUM SERPL-SCNC: 128 MMOL/L (ref 136–145)
SODIUM SERPL-SCNC: 129 MMOL/L (ref 136–145)
WBC # BLD AUTO: 5.9 K/UL (ref 4–11)

## 2024-12-08 PROCEDURE — 80048 BASIC METABOLIC PNL TOTAL CA: CPT

## 2024-12-08 PROCEDURE — 97535 SELF CARE MNGMENT TRAINING: CPT

## 2024-12-08 PROCEDURE — 6370000000 HC RX 637 (ALT 250 FOR IP): Performed by: NURSE PRACTITIONER

## 2024-12-08 PROCEDURE — 2060000000 HC ICU INTERMEDIATE R&B

## 2024-12-08 PROCEDURE — 97530 THERAPEUTIC ACTIVITIES: CPT

## 2024-12-08 PROCEDURE — 6360000002 HC RX W HCPCS: Performed by: INTERNAL MEDICINE

## 2024-12-08 PROCEDURE — 83735 ASSAY OF MAGNESIUM: CPT

## 2024-12-08 PROCEDURE — 6370000000 HC RX 637 (ALT 250 FOR IP): Performed by: INTERNAL MEDICINE

## 2024-12-08 PROCEDURE — 2580000003 HC RX 258: Performed by: INTERNAL MEDICINE

## 2024-12-08 PROCEDURE — 97166 OT EVAL MOD COMPLEX 45 MIN: CPT

## 2024-12-08 PROCEDURE — 36415 COLL VENOUS BLD VENIPUNCTURE: CPT

## 2024-12-08 PROCEDURE — 85025 COMPLETE CBC W/AUTO DIFF WBC: CPT

## 2024-12-08 PROCEDURE — 97116 GAIT TRAINING THERAPY: CPT

## 2024-12-08 PROCEDURE — 97162 PT EVAL MOD COMPLEX 30 MIN: CPT

## 2024-12-08 RX ORDER — OLANZAPINE 5 MG/1
5 TABLET ORAL ONCE
Status: COMPLETED | OUTPATIENT
Start: 2024-12-09 | End: 2024-12-08

## 2024-12-08 RX ORDER — MAGNESIUM SULFATE IN WATER 40 MG/ML
2000 INJECTION, SOLUTION INTRAVENOUS ONCE
Status: COMPLETED | OUTPATIENT
Start: 2024-12-08 | End: 2024-12-08

## 2024-12-08 RX ORDER — MAGNESIUM SULFATE IN WATER 40 MG/ML
4000 INJECTION, SOLUTION INTRAVENOUS ONCE
Status: DISCONTINUED | OUTPATIENT
Start: 2024-12-08 | End: 2024-12-08

## 2024-12-08 RX ORDER — MAGNESIUM SULFATE 1 G/100ML
1000 INJECTION INTRAVENOUS ONCE
Status: DISCONTINUED | OUTPATIENT
Start: 2024-12-08 | End: 2024-12-08

## 2024-12-08 RX ADMIN — PROPRANOLOL HYDROCHLORIDE 20 MG: 10 TABLET ORAL at 13:22

## 2024-12-08 RX ADMIN — RISPERIDONE 0.5 MG: 0.25 TABLET, FILM COATED ORAL at 10:01

## 2024-12-08 RX ADMIN — LEVOTHYROXINE SODIUM 125 MCG: 0.12 TABLET ORAL at 14:00

## 2024-12-08 RX ADMIN — PROPRANOLOL HYDROCHLORIDE 20 MG: 10 TABLET ORAL at 20:04

## 2024-12-08 RX ADMIN — PANTOPRAZOLE SODIUM 40 MG: 40 TABLET, DELAYED RELEASE ORAL at 07:38

## 2024-12-08 RX ADMIN — SERTRALINE HYDROCHLORIDE 50 MG: 50 TABLET ORAL at 07:40

## 2024-12-08 RX ADMIN — POTASSIUM CHLORIDE 10 MEQ: 750 TABLET, EXTENDED RELEASE ORAL at 10:01

## 2024-12-08 RX ADMIN — SODIUM CHLORIDE, PRESERVATIVE FREE 10 ML: 5 INJECTION INTRAVENOUS at 20:05

## 2024-12-08 RX ADMIN — SODIUM CHLORIDE, PRESERVATIVE FREE 10 ML: 5 INJECTION INTRAVENOUS at 10:01

## 2024-12-08 RX ADMIN — PROPRANOLOL HYDROCHLORIDE 20 MG: 10 TABLET ORAL at 16:10

## 2024-12-08 RX ADMIN — HYDROXYZINE HYDROCHLORIDE 10 MG: 10 TABLET ORAL at 20:04

## 2024-12-08 RX ADMIN — MAGNESIUM SULFATE HEPTAHYDRATE 2000 MG: 40 INJECTION, SOLUTION INTRAVENOUS at 10:29

## 2024-12-08 RX ADMIN — MEMANTINE 10 MG: 5 TABLET ORAL at 20:04

## 2024-12-08 RX ADMIN — PROPRANOLOL HYDROCHLORIDE 20 MG: 10 TABLET ORAL at 10:01

## 2024-12-08 RX ADMIN — MEMANTINE 10 MG: 5 TABLET ORAL at 10:01

## 2024-12-08 RX ADMIN — PRIMIDONE 50 MG: 50 TABLET ORAL at 10:00

## 2024-12-08 RX ADMIN — MAGNESIUM SULFATE HEPTAHYDRATE 2000 MG: 40 INJECTION, SOLUTION INTRAVENOUS at 17:16

## 2024-12-08 RX ADMIN — PRIMIDONE 50 MG: 50 TABLET ORAL at 20:04

## 2024-12-08 RX ADMIN — OLANZAPINE 5 MG: 5 TABLET, FILM COATED ORAL at 23:59

## 2024-12-08 RX ADMIN — ENOXAPARIN SODIUM 40 MG: 100 INJECTION SUBCUTANEOUS at 10:00

## 2024-12-08 ASSESSMENT — PAIN SCALES - GENERAL
PAINLEVEL_OUTOF10: 0

## 2024-12-09 LAB
ANION GAP SERPL CALCULATED.3IONS-SCNC: 9 MMOL/L (ref 3–16)
BILIRUB UR QL STRIP.AUTO: NEGATIVE
BUN SERPL-MCNC: 10 MG/DL (ref 7–20)
CALCIUM SERPL-MCNC: 8.8 MG/DL (ref 8.3–10.6)
CHLORIDE SERPL-SCNC: 94 MMOL/L (ref 99–110)
CLARITY UR: CLEAR
CO2 SERPL-SCNC: 27 MMOL/L (ref 21–32)
COLOR UR: YELLOW
CREAT SERPL-MCNC: 0.7 MG/DL (ref 0.6–1.2)
GFR SERPLBLD CREATININE-BSD FMLA CKD-EPI: 83 ML/MIN/{1.73_M2}
GLUCOSE SERPL-MCNC: 104 MG/DL (ref 70–99)
GLUCOSE UR STRIP.AUTO-MCNC: NEGATIVE MG/DL
HGB UR QL STRIP.AUTO: NEGATIVE
KETONES UR STRIP.AUTO-MCNC: NEGATIVE MG/DL
LEUKOCYTE ESTERASE UR QL STRIP.AUTO: NEGATIVE
MAGNESIUM SERPL-MCNC: 2.11 MG/DL (ref 1.8–2.4)
NITRITE UR QL STRIP.AUTO: NEGATIVE
PH UR STRIP.AUTO: 7 [PH] (ref 5–8)
POTASSIUM SERPL-SCNC: 3.5 MMOL/L (ref 3.5–5.1)
PROT UR STRIP.AUTO-MCNC: NEGATIVE MG/DL
SODIUM SERPL-SCNC: 130 MMOL/L (ref 136–145)
SP GR UR STRIP.AUTO: 1.01 (ref 1–1.03)
UA COMPLETE W REFLEX CULTURE PNL UR: NORMAL
UA DIPSTICK W REFLEX MICRO PNL UR: NORMAL
URN SPEC COLLECT METH UR: NORMAL
UROBILINOGEN UR STRIP-ACNC: 0.2 E.U./DL

## 2024-12-09 PROCEDURE — 2060000000 HC ICU INTERMEDIATE R&B

## 2024-12-09 PROCEDURE — 2580000003 HC RX 258

## 2024-12-09 PROCEDURE — 81003 URINALYSIS AUTO W/O SCOPE: CPT

## 2024-12-09 PROCEDURE — 6360000002 HC RX W HCPCS: Performed by: NURSE PRACTITIONER

## 2024-12-09 PROCEDURE — 80048 BASIC METABOLIC PNL TOTAL CA: CPT

## 2024-12-09 PROCEDURE — 83735 ASSAY OF MAGNESIUM: CPT

## 2024-12-09 PROCEDURE — 36415 COLL VENOUS BLD VENIPUNCTURE: CPT

## 2024-12-09 PROCEDURE — 51798 US URINE CAPACITY MEASURE: CPT

## 2024-12-09 PROCEDURE — 51701 INSERT BLADDER CATHETER: CPT

## 2024-12-09 PROCEDURE — 2580000003 HC RX 258: Performed by: INTERNAL MEDICINE

## 2024-12-09 PROCEDURE — 6370000000 HC RX 637 (ALT 250 FOR IP): Performed by: INTERNAL MEDICINE

## 2024-12-09 PROCEDURE — 6360000002 HC RX W HCPCS: Performed by: INTERNAL MEDICINE

## 2024-12-09 RX ORDER — WATER 10 ML/10ML
INJECTION INTRAMUSCULAR; INTRAVENOUS; SUBCUTANEOUS
Status: COMPLETED
Start: 2024-12-09 | End: 2024-12-09

## 2024-12-09 RX ORDER — ZIPRASIDONE MESYLATE 20 MG/ML
10 INJECTION, POWDER, LYOPHILIZED, FOR SOLUTION INTRAMUSCULAR ONCE
Status: COMPLETED | OUTPATIENT
Start: 2024-12-09 | End: 2024-12-09

## 2024-12-09 RX ORDER — MECOBALAMIN 5000 MCG
10 TABLET,DISINTEGRATING ORAL NIGHTLY
Status: COMPLETED | OUTPATIENT
Start: 2024-12-09 | End: 2024-12-09

## 2024-12-09 RX ADMIN — SODIUM CHLORIDE, PRESERVATIVE FREE 10 ML: 5 INJECTION INTRAVENOUS at 21:31

## 2024-12-09 RX ADMIN — MEMANTINE 10 MG: 5 TABLET ORAL at 21:31

## 2024-12-09 RX ADMIN — SERTRALINE HYDROCHLORIDE 50 MG: 50 TABLET ORAL at 10:24

## 2024-12-09 RX ADMIN — Medication 10 MG: at 21:31

## 2024-12-09 RX ADMIN — ZIPRASIDONE MESYLATE 10 MG: 20 INJECTION, POWDER, LYOPHILIZED, FOR SOLUTION INTRAMUSCULAR at 02:34

## 2024-12-09 RX ADMIN — PRIMIDONE 50 MG: 50 TABLET ORAL at 21:30

## 2024-12-09 RX ADMIN — POTASSIUM CHLORIDE 10 MEQ: 750 TABLET, EXTENDED RELEASE ORAL at 10:24

## 2024-12-09 RX ADMIN — ENOXAPARIN SODIUM 40 MG: 100 INJECTION SUBCUTANEOUS at 10:23

## 2024-12-09 RX ADMIN — RISPERIDONE 0.5 MG: 0.25 TABLET, FILM COATED ORAL at 10:24

## 2024-12-09 RX ADMIN — PROPRANOLOL HYDROCHLORIDE 20 MG: 10 TABLET ORAL at 10:23

## 2024-12-09 RX ADMIN — PANTOPRAZOLE SODIUM 40 MG: 40 TABLET, DELAYED RELEASE ORAL at 06:20

## 2024-12-09 RX ADMIN — PRIMIDONE 50 MG: 50 TABLET ORAL at 10:24

## 2024-12-09 RX ADMIN — LEVOTHYROXINE SODIUM 125 MCG: 0.12 TABLET ORAL at 06:20

## 2024-12-09 RX ADMIN — MEMANTINE 10 MG: 5 TABLET ORAL at 10:24

## 2024-12-09 RX ADMIN — PROPRANOLOL HYDROCHLORIDE 20 MG: 10 TABLET ORAL at 13:06

## 2024-12-09 RX ADMIN — WATER: 1 INJECTION INTRAMUSCULAR; INTRAVENOUS; SUBCUTANEOUS at 03:09

## 2024-12-09 RX ADMIN — PROPRANOLOL HYDROCHLORIDE 20 MG: 10 TABLET ORAL at 21:31

## 2024-12-09 ASSESSMENT — PAIN SCALES - GENERAL
PAINLEVEL_OUTOF10: 0
PAINLEVEL_OUTOF10: 0

## 2024-12-09 NOTE — CARE COORDINATION
Case Management Assessment  Initial Evaluation    Date/Time of Evaluation: 12/9/2024 1:37 PM  Assessment Completed by: Karen Francisco RN    If patient is discharged prior to next notation, then this note serves as note for discharge by case management.    Patient Name: Radha Magallon                   YOB: 1935  Diagnosis: Hyponatremia [E87.1]  Weakness of both legs [R29.898]  Goals of care, counseling/discussion [Z71.89]  Fall, initial encounter [W19.XXXA]  History of dementia [Z86.59]                   Date / Time: 12/7/2024 12:24 PM    Patient Admission Status: Inpatient   Readmission Risk (Low < 19, Mod (19-27), High > 27): Readmission Risk Score: 9.5    Current PCP: Alec Andrew MD  PCP verified by CM? Yes    Chart Reviewed: Yes      History Provided by: Child/Family, Medical Record  Patient Orientation: Self (Pt is very sleepy and oriented to self)    Patient Cognition: Dementia / Early Alzheimer's    Hospitalization in the last 30 days (Readmission):  No    If yes, Readmission Assessment in  Navigator will be completed.    Advance Directives:      Code Status: DNR-CCA   Patient's Primary Decision Maker is: Legal Next of Kin    Primary Decision Maker: betsey bustosi - Child - [phone]    Secondary Decision Maker: CHINO MUNOZ - Child - [phone]    Discharge Planning:    Patient lives with: Alone Type of Home: Independent Living  Primary Care Giver: Family (family at [facility])  Patient Support Systems include: Family Members, Children   Current Financial resources: Medicare  Current community resources: None  Current services prior to admission: [facility]            Current DME:              Type of Home Care services:  None    ADLS  Prior functional level: Assistance with the following:, Bathing, Dressing, Cooking, Housework, Shopping  Current functional level: Assistance with the following:, Bathing, Dressing, Toileting, Cooking, Housework, Shopping, Mobility    PT

## 2024-12-10 LAB
ANION GAP SERPL CALCULATED.3IONS-SCNC: 7 MMOL/L (ref 3–16)
BUN SERPL-MCNC: 16 MG/DL (ref 7–20)
CALCIUM SERPL-MCNC: 8.4 MG/DL (ref 8.3–10.6)
CHLORIDE SERPL-SCNC: 98 MMOL/L (ref 99–110)
CO2 SERPL-SCNC: 27 MMOL/L (ref 21–32)
CREAT SERPL-MCNC: 0.8 MG/DL (ref 0.6–1.2)
GFR SERPLBLD CREATININE-BSD FMLA CKD-EPI: 71 ML/MIN/{1.73_M2}
GLUCOSE SERPL-MCNC: 99 MG/DL (ref 70–99)
POTASSIUM SERPL-SCNC: 4.2 MMOL/L (ref 3.5–5.1)
SODIUM SERPL-SCNC: 132 MMOL/L (ref 136–145)

## 2024-12-10 PROCEDURE — 6360000002 HC RX W HCPCS: Performed by: INTERNAL MEDICINE

## 2024-12-10 PROCEDURE — 97110 THERAPEUTIC EXERCISES: CPT

## 2024-12-10 PROCEDURE — 6370000000 HC RX 637 (ALT 250 FOR IP): Performed by: NURSE PRACTITIONER

## 2024-12-10 PROCEDURE — 97116 GAIT TRAINING THERAPY: CPT

## 2024-12-10 PROCEDURE — 97530 THERAPEUTIC ACTIVITIES: CPT

## 2024-12-10 PROCEDURE — 2060000000 HC ICU INTERMEDIATE R&B

## 2024-12-10 PROCEDURE — 2580000003 HC RX 258: Performed by: INTERNAL MEDICINE

## 2024-12-10 PROCEDURE — 97535 SELF CARE MNGMENT TRAINING: CPT

## 2024-12-10 PROCEDURE — 80048 BASIC METABOLIC PNL TOTAL CA: CPT

## 2024-12-10 PROCEDURE — 6370000000 HC RX 637 (ALT 250 FOR IP): Performed by: INTERNAL MEDICINE

## 2024-12-10 RX ORDER — HYDROXYZINE HYDROCHLORIDE 10 MG/1
10 TABLET, FILM COATED ORAL 2 TIMES DAILY PRN
Status: DISCONTINUED | OUTPATIENT
Start: 2024-12-10 | End: 2024-12-11

## 2024-12-10 RX ORDER — MECOBALAMIN 5000 MCG
10 TABLET,DISINTEGRATING ORAL NIGHTLY
Status: DISCONTINUED | OUTPATIENT
Start: 2024-12-10 | End: 2024-12-12 | Stop reason: HOSPADM

## 2024-12-10 RX ADMIN — LEVOTHYROXINE SODIUM 125 MCG: 0.12 TABLET ORAL at 06:47

## 2024-12-10 RX ADMIN — MEMANTINE 10 MG: 5 TABLET ORAL at 21:04

## 2024-12-10 RX ADMIN — PROPRANOLOL HYDROCHLORIDE 20 MG: 10 TABLET ORAL at 21:04

## 2024-12-10 RX ADMIN — HYDROXYZINE HYDROCHLORIDE 10 MG: 10 TABLET ORAL at 06:47

## 2024-12-10 RX ADMIN — MEMANTINE 10 MG: 5 TABLET ORAL at 09:14

## 2024-12-10 RX ADMIN — PROPRANOLOL HYDROCHLORIDE 20 MG: 10 TABLET ORAL at 17:08

## 2024-12-10 RX ADMIN — HYDROXYZINE HYDROCHLORIDE 10 MG: 10 TABLET ORAL at 21:06

## 2024-12-10 RX ADMIN — PANTOPRAZOLE SODIUM 40 MG: 40 TABLET, DELAYED RELEASE ORAL at 06:47

## 2024-12-10 RX ADMIN — POTASSIUM CHLORIDE 10 MEQ: 750 TABLET, EXTENDED RELEASE ORAL at 09:14

## 2024-12-10 RX ADMIN — SERTRALINE HYDROCHLORIDE 50 MG: 50 TABLET ORAL at 09:14

## 2024-12-10 RX ADMIN — Medication 10 MG: at 21:45

## 2024-12-10 RX ADMIN — PROPRANOLOL HYDROCHLORIDE 20 MG: 10 TABLET ORAL at 09:22

## 2024-12-10 RX ADMIN — PRIMIDONE 50 MG: 50 TABLET ORAL at 21:04

## 2024-12-10 RX ADMIN — RISPERIDONE 0.5 MG: 0.25 TABLET, FILM COATED ORAL at 09:14

## 2024-12-10 RX ADMIN — SODIUM CHLORIDE, PRESERVATIVE FREE 10 ML: 5 INJECTION INTRAVENOUS at 09:15

## 2024-12-10 RX ADMIN — PRIMIDONE 50 MG: 50 TABLET ORAL at 09:14

## 2024-12-10 RX ADMIN — ENOXAPARIN SODIUM 40 MG: 100 INJECTION SUBCUTANEOUS at 09:15

## 2024-12-10 NOTE — CONSULTS
Palliative Care Initial Note  Palliative Care Admit date: 12/10/24    ACP/palcare referral noted

## 2024-12-10 NOTE — CARE COORDINATION
RAINER update- Pt will probably be discharged tomorrow back to [facility] in  [place] but in the Assisted Living  unit . Rainer faxed the H & P from Dr. Stokes to [facility]. The Med Rec will need to be updated and faxed to them at discharge. Also the attending will need to write orders for PT/OT . [facility] has a 3rd party company on site that does therapies.     [facility] Du[facility] wants to transport the pt back to [facility].

## 2024-12-10 NOTE — ACP (ADVANCE CARE PLANNING)
Advance Care Planning     Palliative Team Advance Care Planning (ACP) Conversation    Date of Conversation: 12/10/24    Individuals present for the conversation: Daughter Yesenia     ACP documents on file prior to discussion:  -Power of  for Healthcare  -Living Will    Previously completed document/s not on file: Not discussed.    Healthcare Decision Maker:    Primary Decision Maker: yesneia bustos - Child - [phone]    Secondary Decision Maker: CHINO MUNOZ - Child - [phone]     Conversation Summary:  Spoke w/ Yesenia via phone who has been designated as pts HCPOA.  Writer called to seek clarification as to pts wishes re: resuscitation.  After clarification of cpr and intubation, Yesenia was clear to say that, besides NO cpr, pt would NOT want to be intubated, even for the short term.  She understands that we have multiple levels of oxygen support if pts respiratory status worsened.  However, if the higher levels of respiratory support were not effective, pt would prefer that we shift the focus of support to comfort rather than intubate her.  Code status further modified to 'limited' to reflect DNR/DNI.     We also had discussion as to the progression of dementia, likely resulting in changes to appetite or difficulty swallowing.  Yesenia stated they are aware of that likely eventuality and \"that will be a sad time.\"  For now, pt is eating well and w/o seeming difficulties.      Family planning d/c to Assisted Living (from pts previous Independent Living) apt @ [place] and will have PT there.    Resuscitation Status:   Code Status: DNR-CCA  Modified to 'Limited'    Documentation Completed:  -No new documents completed.    I spent 30 minutes with the patient and/or surrogate decision maker discussing the patient's wishes and goals.

## 2024-12-11 LAB
ANION GAP SERPL CALCULATED.3IONS-SCNC: 9 MMOL/L (ref 3–16)
BUN SERPL-MCNC: 15 MG/DL (ref 7–20)
CALCIUM SERPL-MCNC: 8.7 MG/DL (ref 8.3–10.6)
CHLORIDE SERPL-SCNC: 99 MMOL/L (ref 99–110)
CO2 SERPL-SCNC: 26 MMOL/L (ref 21–32)
CREAT SERPL-MCNC: 0.8 MG/DL (ref 0.6–1.2)
GFR SERPLBLD CREATININE-BSD FMLA CKD-EPI: 71 ML/MIN/{1.73_M2}
GLUCOSE SERPL-MCNC: 105 MG/DL (ref 70–99)
POTASSIUM SERPL-SCNC: 4.5 MMOL/L (ref 3.5–5.1)
SODIUM SERPL-SCNC: 134 MMOL/L (ref 136–145)

## 2024-12-11 PROCEDURE — 2580000003 HC RX 258: Performed by: INTERNAL MEDICINE

## 2024-12-11 PROCEDURE — 6370000000 HC RX 637 (ALT 250 FOR IP)

## 2024-12-11 PROCEDURE — 6370000000 HC RX 637 (ALT 250 FOR IP): Performed by: NURSE PRACTITIONER

## 2024-12-11 PROCEDURE — 6370000000 HC RX 637 (ALT 250 FOR IP): Performed by: INTERNAL MEDICINE

## 2024-12-11 PROCEDURE — 80048 BASIC METABOLIC PNL TOTAL CA: CPT

## 2024-12-11 PROCEDURE — 6360000002 HC RX W HCPCS: Performed by: INTERNAL MEDICINE

## 2024-12-11 PROCEDURE — 97110 THERAPEUTIC EXERCISES: CPT

## 2024-12-11 PROCEDURE — 97530 THERAPEUTIC ACTIVITIES: CPT

## 2024-12-11 PROCEDURE — 97535 SELF CARE MNGMENT TRAINING: CPT

## 2024-12-11 PROCEDURE — 2060000000 HC ICU INTERMEDIATE R&B

## 2024-12-11 RX ORDER — QUETIAPINE FUMARATE 25 MG/1
25 TABLET, FILM COATED ORAL ONCE
Status: COMPLETED | OUTPATIENT
Start: 2024-12-11 | End: 2024-12-11

## 2024-12-11 RX ORDER — HYDROXYZINE HYDROCHLORIDE 25 MG/1
25 TABLET, FILM COATED ORAL EVERY 6 HOURS PRN
Status: DISCONTINUED | OUTPATIENT
Start: 2024-12-11 | End: 2024-12-12 | Stop reason: HOSPADM

## 2024-12-11 RX ADMIN — SODIUM CHLORIDE, PRESERVATIVE FREE 10 ML: 5 INJECTION INTRAVENOUS at 20:20

## 2024-12-11 RX ADMIN — HYDROXYZINE HYDROCHLORIDE 25 MG: 25 TABLET, FILM COATED ORAL at 18:53

## 2024-12-11 RX ADMIN — ENOXAPARIN SODIUM 40 MG: 100 INJECTION SUBCUTANEOUS at 08:11

## 2024-12-11 RX ADMIN — LEVOTHYROXINE SODIUM 125 MCG: 0.12 TABLET ORAL at 07:41

## 2024-12-11 RX ADMIN — SODIUM CHLORIDE, PRESERVATIVE FREE 10 ML: 5 INJECTION INTRAVENOUS at 08:13

## 2024-12-11 RX ADMIN — HYDROXYZINE HYDROCHLORIDE 10 MG: 10 TABLET ORAL at 12:40

## 2024-12-11 RX ADMIN — RISPERIDONE 0.5 MG: 0.25 TABLET, FILM COATED ORAL at 08:12

## 2024-12-11 RX ADMIN — PROPRANOLOL HYDROCHLORIDE 20 MG: 10 TABLET ORAL at 08:12

## 2024-12-11 RX ADMIN — SERTRALINE HYDROCHLORIDE 50 MG: 50 TABLET ORAL at 08:11

## 2024-12-11 RX ADMIN — PROPRANOLOL HYDROCHLORIDE 20 MG: 10 TABLET ORAL at 12:39

## 2024-12-11 RX ADMIN — PRIMIDONE 50 MG: 50 TABLET ORAL at 20:19

## 2024-12-11 RX ADMIN — PRIMIDONE 50 MG: 50 TABLET ORAL at 08:11

## 2024-12-11 RX ADMIN — PANTOPRAZOLE SODIUM 40 MG: 40 TABLET, DELAYED RELEASE ORAL at 07:42

## 2024-12-11 RX ADMIN — MEMANTINE 10 MG: 5 TABLET ORAL at 08:12

## 2024-12-11 RX ADMIN — Medication 10 MG: at 20:19

## 2024-12-11 RX ADMIN — QUETIAPINE FUMARATE 25 MG: 25 TABLET ORAL at 01:04

## 2024-12-11 RX ADMIN — PROPRANOLOL HYDROCHLORIDE 20 MG: 10 TABLET ORAL at 20:18

## 2024-12-11 RX ADMIN — PROPRANOLOL HYDROCHLORIDE 20 MG: 10 TABLET ORAL at 17:18

## 2024-12-11 RX ADMIN — MEMANTINE 10 MG: 5 TABLET ORAL at 20:19

## 2024-12-11 ASSESSMENT — PAIN SCALES - GENERAL
PAINLEVEL_OUTOF10: 5
PAINLEVEL_OUTOF10: 3
PAINLEVEL_OUTOF10: 0

## 2024-12-11 ASSESSMENT — PAIN DESCRIPTION - LOCATION: LOCATION: BACK

## 2024-12-11 NOTE — CARE COORDINATION
LOS 4.  Care managed by [facility]  Here w Hyponatremia, Fall, Dementia. Pt is from [facility]- plan DC to [facility]- family to trans. Will need PT OT orders for facility. Pt has sitter at bedside.  Call placed to DON at facility to ensure pt appropriate for this level of care.  Discussed w granddtr at bedside and dtr on phone. Continue to follow. Emy Jiménez RN      1141 Again placed call to [facility] [place] [facility]"" has only received an H&P.  Sent current MD notes and therapy to fax # [phone].  They will review. Discussed pt's needs for supervision- again notified pt has sitter.  Spoke to dtr on phone- family would like to know why pt became hyponatremic and what DC meds will be.  Continue to follow. Emy Jiménez RN     7076 Received call from [facility] updated fax number for records- [phone]- faxed as requested.  Await their review. Discussed w attending- states hyponatremia likely from HCTZ. Await dispo confirmation for final med rec and poss DC orders. Dtr updated. Continue to follow. Emy Jiménez RN     1410 [facility] now stating they rec'd fax- but not in its entirety.  Emailed to ELISHA.  Emy Jiménez RN     1500 Another call placed to [facility] at [phone] to confirm records rec'd and confirm pt acceptance.  Msg left. Emy Jiménez RN     1600 Another call placed to [facility] at [phone] to confirm records rec'd and confirm pt acceptance. Msg left. Call to dtr to update. States another nsg contact at facility is Mariana at [phone]- but she is gone for the day. Attending updated.  Emy Jiménez RN

## 2024-12-12 VITALS
DIASTOLIC BLOOD PRESSURE: 64 MMHG | OXYGEN SATURATION: 95 % | HEIGHT: 63 IN | SYSTOLIC BLOOD PRESSURE: 125 MMHG | HEART RATE: 61 BPM | BODY MASS INDEX: 26.95 KG/M2 | WEIGHT: 152.12 LBS | RESPIRATION RATE: 16 BRPM | TEMPERATURE: 98.5 F

## 2024-12-12 LAB
ANION GAP SERPL CALCULATED.3IONS-SCNC: 8 MMOL/L (ref 3–16)
BASOPHILS # BLD: 0.1 K/UL (ref 0–0.2)
BASOPHILS NFR BLD: 1.1 %
BUN SERPL-MCNC: 14 MG/DL (ref 7–20)
CALCIUM SERPL-MCNC: 8.8 MG/DL (ref 8.3–10.6)
CHLORIDE SERPL-SCNC: 96 MMOL/L (ref 99–110)
CO2 SERPL-SCNC: 27 MMOL/L (ref 21–32)
CREAT SERPL-MCNC: 0.6 MG/DL (ref 0.6–1.2)
DEPRECATED RDW RBC AUTO: 13 % (ref 12.4–15.4)
EOSINOPHIL # BLD: 0.2 K/UL (ref 0–0.6)
EOSINOPHIL NFR BLD: 3.8 %
GFR SERPLBLD CREATININE-BSD FMLA CKD-EPI: 86 ML/MIN/{1.73_M2}
GLUCOSE SERPL-MCNC: 98 MG/DL (ref 70–99)
HCT VFR BLD AUTO: 32.8 % (ref 36–48)
HGB BLD-MCNC: 11.2 G/DL (ref 12–16)
LYMPHOCYTES # BLD: 1.8 K/UL (ref 1–5.1)
LYMPHOCYTES NFR BLD: 32.1 %
MCH RBC QN AUTO: 33.3 PG (ref 26–34)
MCHC RBC AUTO-ENTMCNC: 34 G/DL (ref 31–36)
MCV RBC AUTO: 97.9 FL (ref 80–100)
MONOCYTES # BLD: 1 K/UL (ref 0–1.3)
MONOCYTES NFR BLD: 17.7 %
NEUTROPHILS # BLD: 2.6 K/UL (ref 1.7–7.7)
NEUTROPHILS NFR BLD: 45.3 %
PLATELET # BLD AUTO: 219 K/UL (ref 135–450)
PMV BLD AUTO: 7.7 FL (ref 5–10.5)
POTASSIUM SERPL-SCNC: 4.3 MMOL/L (ref 3.5–5.1)
RBC # BLD AUTO: 3.35 M/UL (ref 4–5.2)
SODIUM SERPL-SCNC: 131 MMOL/L (ref 136–145)
WBC # BLD AUTO: 5.8 K/UL (ref 4–11)

## 2024-12-12 PROCEDURE — 36415 COLL VENOUS BLD VENIPUNCTURE: CPT

## 2024-12-12 PROCEDURE — 6370000000 HC RX 637 (ALT 250 FOR IP)

## 2024-12-12 PROCEDURE — 2580000003 HC RX 258: Performed by: INTERNAL MEDICINE

## 2024-12-12 PROCEDURE — 6370000000 HC RX 637 (ALT 250 FOR IP): Performed by: INTERNAL MEDICINE

## 2024-12-12 PROCEDURE — 85025 COMPLETE CBC W/AUTO DIFF WBC: CPT

## 2024-12-12 PROCEDURE — 80048 BASIC METABOLIC PNL TOTAL CA: CPT

## 2024-12-12 PROCEDURE — 6360000002 HC RX W HCPCS: Performed by: INTERNAL MEDICINE

## 2024-12-12 RX ADMIN — RISPERIDONE 0.5 MG: 0.25 TABLET, FILM COATED ORAL at 10:10

## 2024-12-12 RX ADMIN — ENOXAPARIN SODIUM 40 MG: 100 INJECTION SUBCUTANEOUS at 10:10

## 2024-12-12 RX ADMIN — LEVOTHYROXINE SODIUM 125 MCG: 0.12 TABLET ORAL at 06:05

## 2024-12-12 RX ADMIN — PROPRANOLOL HYDROCHLORIDE 20 MG: 10 TABLET ORAL at 10:35

## 2024-12-12 RX ADMIN — PANTOPRAZOLE SODIUM 40 MG: 40 TABLET, DELAYED RELEASE ORAL at 06:05

## 2024-12-12 RX ADMIN — MEMANTINE 10 MG: 5 TABLET ORAL at 10:10

## 2024-12-12 RX ADMIN — SODIUM CHLORIDE, PRESERVATIVE FREE 10 ML: 5 INJECTION INTRAVENOUS at 10:17

## 2024-12-12 RX ADMIN — PRIMIDONE 50 MG: 50 TABLET ORAL at 10:10

## 2024-12-12 RX ADMIN — HYDROXYZINE HYDROCHLORIDE 25 MG: 25 TABLET, FILM COATED ORAL at 06:05

## 2024-12-12 RX ADMIN — SERTRALINE HYDROCHLORIDE 50 MG: 50 TABLET ORAL at 10:10

## 2024-12-12 ASSESSMENT — PAIN SCALES - GENERAL: PAINLEVEL_OUTOF10: 0

## 2024-12-12 NOTE — CARE COORDINATION
Attending wants the pt to have C since going to AL at [facility]. CM spoke to [facility] , they accepted.         CASE MANAGEMENT DISCHARGE SUMMARY      Discharge to: [facility]    IMM given: 12/11/24    New Durable Medical Equipment ordered/agency: no    Transportation:    Family/car:yes  w/ daughter Yesenia   Confirmed discharge plan with:     Patient: yes     Family:  yes    Name:Yesenia Contact number:     Facility/Agency, name:  [facility] faxed   Phone number for report to facility:   [phone]   RN, name: Nadine    Note: Discharging nurse to complete OUSMANE, reconcile [facility], and place final copy with patient's discharge packet. RN to ensure that written prescriptions for  Level II medications are sent with patient to the facility as per protocol.      Karen Francisco RN Case manager

## 2024-12-12 NOTE — PLAN OF CARE
Problem: Confusion  Goal: Confusion, delirium, dementia, or psychosis is improved or at baseline  Description: INTERVENTIONS:  1. Assess for possible contributors to thought disturbance, including medications, impaired vision or hearing, underlying metabolic abnormalities, dehydration, psychiatric diagnoses, and notify attending LIP  2. [place] high risk fall precautions, as indicated  3. Provide frequent short contacts to provide reality reorientation, refocusing and direction  4. Decrease environmental stimuli, including noise as appropriate  5. Monitor and intervene to maintain adequate nutrition, hydration, elimination, sleep and activity  6. If unable to ensure safety without constant attention obtain sitter and review sitter guidelines with assigned personnel  7. Initiate Psychosocial CNS and Spiritual Care consult, as indicated  12/9/2024 5852 by Suzanne Painter, RN  Outcome: Progressing  12/9/2024 0542 by Marion Luz, RN  Outcome: Not Progressing     
  Problem: Confusion  Goal: Confusion, delirium, dementia, or psychosis is improved or at baseline  Description: INTERVENTIONS:  1. Assess for possible contributors to thought disturbance, including medications, impaired vision or hearing, underlying metabolic abnormalities, dehydration, psychiatric diagnoses, and notify attending LIP  2. [place] high risk fall precautions, as indicated  3. Provide frequent short contacts to provide reality reorientation, refocusing and direction  4. Decrease environmental stimuli, including noise as appropriate  5. Monitor and intervene to maintain adequate nutrition, hydration, elimination, sleep and activity  6. If unable to ensure safety without constant attention obtain sitter and review sitter guidelines with assigned personnel  7. Initiate Psychosocial CNS and Spiritual Care consult, as indicated  Outcome: Not Progressing     Problem: Confusion  Goal: Confusion, delirium, dementia, or psychosis is improved or at baseline  Description: INTERVENTIONS:  1. Assess for possible contributors to thought disturbance, including medications, impaired vision or hearing, underlying metabolic abnormalities, dehydration, psychiatric diagnoses, and notify attending LIP  2. [place] high risk fall precautions, as indicated  3. Provide frequent short contacts to provide reality reorientation, refocusing and direction  4. Decrease environmental stimuli, including noise as appropriate  5. Monitor and intervene to maintain adequate nutrition, hydration, elimination, sleep and activity  6. If unable to ensure safety without constant attention obtain sitter and review sitter guidelines with assigned personnel  7. Initiate Psychosocial CNS and Spiritual Care consult, as indicated  Outcome: Not Progressing     
  Problem: Discharge Planning  Goal: Discharge to home or other facility with appropriate resources  12/10/2024 0309 by Ember Hoffmann RN  Outcome: Progressing  12/9/2024 1742 by Suzanne Painter RN  Outcome: Progressing     Problem: Pain  Goal: Verbalizes/displays adequate comfort level or baseline comfort level  12/10/2024 0309 by Ember Hoffmann RN  Outcome: Progressing  12/9/2024 1742 by Suzanne Painter RN  Outcome: Progressing     Problem: Confusion  Goal: Confusion, delirium, dementia, or psychosis is improved or at baseline  Description: INTERVENTIONS:  1. Assess for possible contributors to thought disturbance, including medications, impaired vision or hearing, underlying metabolic abnormalities, dehydration, psychiatric diagnoses, and notify attending LIP  2. [place] high risk fall precautions, as indicated  3. Provide frequent short contacts to provide reality reorientation, refocusing and direction  4. Decrease environmental stimuli, including noise as appropriate  5. Monitor and intervene to maintain adequate nutrition, hydration, elimination, sleep and activity  6. If unable to ensure safety without constant attention obtain sitter and review sitter guidelines with assigned personnel  7. Initiate Psychosocial CNS and Spiritual Care consult, as indicated  12/10/2024 0309 by Ember Hoffmann RN  Outcome: Progressing  12/9/2024 1742 by Suzanne Painter RN  Outcome: Progressing     Problem: Skin/Tissue Integrity  Goal: Absence of new skin breakdown  Description: 1.  Monitor for areas of redness and/or skin breakdown  2.  Assess vascular access sites hourly  3.  Every 4-6 hours minimum:  Change oxygen saturation probe site  4.  Every 4-6 hours:  If on nasal continuous positive airway pressure, respiratory therapy assess nares and determine need for appliance change or resting period.  12/10/2024 0309 by Ember Hoffmann RN  Outcome: Progressing  12/9/2024 1742 by Inocencio 
  Problem: Discharge Planning  Goal: Discharge to home or other facility with appropriate resources  12/10/2024 1358 by Mily Adkins RN  Outcome: Progressing  12/10/2024 0309 by Ember Hoffmann RN  Outcome: Progressing     Problem: Pain  Goal: Verbalizes/displays adequate comfort level or baseline comfort level  12/10/2024 1358 by Mily Adkins RN  Outcome: Progressing  12/10/2024 0309 by Ember Hoffmann RN  Outcome: Progressing     Problem: Confusion  Goal: Confusion, delirium, dementia, or psychosis is improved or at baseline  Description: INTERVENTIONS:  1. Assess for possible contributors to thought disturbance, including medications, impaired vision or hearing, underlying metabolic abnormalities, dehydration, psychiatric diagnoses, and notify attending LIP  2. [place] high risk fall precautions, as indicated  3. Provide frequent short contacts to provide reality reorientation, refocusing and direction  4. Decrease environmental stimuli, including noise as appropriate  5. Monitor and intervene to maintain adequate nutrition, hydration, elimination, sleep and activity  6. If unable to ensure safety without constant attention obtain sitter and review sitter guidelines with assigned personnel  7. Initiate Psychosocial CNS and Spiritual Care consult, as indicated  12/10/2024 1358 by Mily Adkins RN  Outcome: Progressing  12/10/2024 0309 by Ember Hoffmann RN  Outcome: Progressing     Problem: Skin/Tissue Integrity  Goal: Absence of new skin breakdown  Description: 1.  Monitor for areas of redness and/or skin breakdown  2.  Assess vascular access sites hourly  3.  Every 4-6 hours minimum:  Change oxygen saturation probe site  4.  Every 4-6 hours:  If on nasal continuous positive airway pressure, respiratory therapy assess nares and determine need for appliance change or resting period.  12/10/2024 1358 by Mily Adkins RN  Outcome: Progressing  12/10/2024 0309 by Ember Hoffmann 
  Problem: Discharge Planning  Goal: Discharge to home or other facility with appropriate resources  12/12/2024 1146 by Nadine Martinez RN  Outcome: Adequate for Discharge  12/12/2024 0237 by Marion Luz RN  Outcome: Progressing     Problem: Pain  Goal: Verbalizes/displays adequate comfort level or baseline comfort level  12/12/2024 1146 by Nadine Martinez RN  Outcome: Adequate for Discharge  12/12/2024 0237 by Marion Luz RN  Outcome: Progressing     Problem: Confusion  Goal: Confusion, delirium, dementia, or psychosis is improved or at baseline  Description: INTERVENTIONS:  1. Assess for possible contributors to thought disturbance, including medications, impaired vision or hearing, underlying metabolic abnormalities, dehydration, psychiatric diagnoses, and notify attending LIP  2. [place] high risk fall precautions, as indicated  3. Provide frequent short contacts to provide reality reorientation, refocusing and direction  4. Decrease environmental stimuli, including noise as appropriate  5. Monitor and intervene to maintain adequate nutrition, hydration, elimination, sleep and activity  6. If unable to ensure safety without constant attention obtain sitter and review sitter guidelines with assigned personnel  7. Initiate Psychosocial CNS and Spiritual Care consult, as indicated  12/12/2024 1146 by Nadine Martinez RN  Outcome: Adequate for Discharge  12/12/2024 0237 by Marion Luz RN  Outcome: Progressing     Problem: Skin/Tissue Integrity  Goal: Absence of new skin breakdown  Description: 1.  Monitor for areas of redness and/or skin breakdown  2.  Assess vascular access sites hourly  3.  Every 4-6 hours minimum:  Change oxygen saturation probe site  4.  Every 4-6 hours:  If on nasal continuous positive airway pressure, respiratory therapy assess nares and determine need for appliance change or resting period.  Outcome: Adequate for Discharge     Problem: ABCDS Injury Assessment  Goal: Absence of physical 
  Problem: Discharge Planning  Goal: Discharge to home or other facility with appropriate resources  Outcome: Progressing     Problem: Pain  Goal: Verbalizes/displays adequate comfort level or baseline comfort level  Outcome: Progressing     Problem: Confusion  Goal: Confusion, delirium, dementia, or psychosis is improved or at baseline  Description: INTERVENTIONS:  1. Assess for possible contributors to thought disturbance, including medications, impaired vision or hearing, underlying metabolic abnormalities, dehydration, psychiatric diagnoses, and notify attending LIP  2. [place] high risk fall precautions, as indicated  3. Provide frequent short contacts to provide reality reorientation, refocusing and direction  4. Decrease environmental stimuli, including noise as appropriate  5. Monitor and intervene to maintain adequate nutrition, hydration, elimination, sleep and activity  6. If unable to ensure safety without constant attention obtain sitter and review sitter guidelines with assigned personnel  7. Initiate Psychosocial CNS and Spiritual Care consult, as indicated  Outcome: Progressing     
  Problem: Discharge Planning  Goal: Discharge to home or other facility with appropriate resources  Outcome: Progressing     Problem: Pain  Goal: Verbalizes/displays adequate comfort level or baseline comfort level  Outcome: Progressing     Problem: Confusion  Goal: Confusion, delirium, dementia, or psychosis is improved or at baseline  Description: INTERVENTIONS:  1. Assess for possible contributors to thought disturbance, including medications, impaired vision or hearing, underlying metabolic abnormalities, dehydration, psychiatric diagnoses, and notify attending LIP  2. [place] high risk fall precautions, as indicated  3. Provide frequent short contacts to provide reality reorientation, refocusing and direction  4. Decrease environmental stimuli, including noise as appropriate  5. Monitor and intervene to maintain adequate nutrition, hydration, elimination, sleep and activity  6. If unable to ensure safety without constant attention obtain sitter and review sitter guidelines with assigned personnel  7. Initiate Psychosocial CNS and Spiritual Care consult, as indicated  Outcome: Progressing     
  Problem: Pain  Goal: Verbalizes/displays adequate comfort level or baseline comfort level  12/8/2024 1058 by Klaudia Lizama RN  Outcome: Progressing  12/8/2024 0159 by Marion Luz RN  Outcome: Progressing     Problem: Confusion  Goal: Confusion, delirium, dementia, or psychosis is improved or at baseline  Description: INTERVENTIONS:  1. Assess for possible contributors to thought disturbance, including medications, impaired vision or hearing, underlying metabolic abnormalities, dehydration, psychiatric diagnoses, and notify attending LIP  2. [place] high risk fall precautions, as indicated  3. Provide frequent short contacts to provide reality reorientation, refocusing and direction  4. Decrease environmental stimuli, including noise as appropriate  5. Monitor and intervene to maintain adequate nutrition, hydration, elimination, sleep and activity  6. If unable to ensure safety without constant attention obtain sitter and review sitter guidelines with assigned personnel  7. Initiate Psychosocial CNS and Spiritual Care consult, as indicated  12/8/2024 1058 by Klaudia Lizmaa RN  Outcome: Progressing  12/8/2024 0159 by Marion Luz RN  Outcome: Progressing     Problem: Skin/Tissue Integrity  Goal: Absence of new skin breakdown  Description: 1.  Monitor for areas of redness and/or skin breakdown  2.  Assess vascular access sites hourly  3.  Every 4-6 hours minimum:  Change oxygen saturation probe site  4.  Every 4-6 hours:  If on nasal continuous positive airway pressure, respiratory therapy assess nares and determine need for appliance change or resting period.  Outcome: Progressing     
Increase function to baseline.    
UE There ex, Bed mobility, Transfers, ADL training, Adaptative equipment training, Therapeutic activity, Neuromuscular re-education, Patient education   
Assessment  Goal: Absence of physical injury  12/11/2024 0247 by Ember Hoffmann RN  Outcome: Progressing  12/10/2024 1358 by Mily Adkins RN  Outcome: Progressing     Problem: Safety - Adult  Goal: Free from fall injury  12/11/2024 0247 by Ember Hoffmann RN  Outcome: Progressing  12/10/2024 1358 by Mily Adkins RN  Outcome: Progressing     Problem: Safety - Medical Restraint  Goal: Remains free of injury from restraints (Restraint for Interference with Medical Device)  Description: INTERVENTIONS:  1. Determine that other, less restrictive measures have been tried or would not be effective before applying the restraint  2. Evaluate the patient's condition at the time of restraint application  3. Inform patient/family regarding the reason for restraint  4. Q2H: Monitor safety, psychosocial status, comfort, nutrition and hydration  12/11/2024 0247 by Ember Hoffmann RN  Outcome: Progressing  12/10/2024 1358 by Mily Adkins RN  Outcome: Progressing

## 2024-12-12 NOTE — CARE COORDINATION
RAINER spoke with [facility] L[facility]pez and the pt is ready for discharge. CM then called [facility] and they are ready for the pt to return. They are requesting us to fax the Med Rec to them @ [phone] asap. Pt will need orders for PT/OT there at discharge.    CM will notify the [facility] for transport as soon as orders are received. RN aware.

## 2024-12-12 NOTE — CARE COORDINATION
CM discussed with the dtr about the pt getting HHC because she isn't going to SNF. Only one company can provide all services. [facility] is following and can provide NSG/PT/OT. The dtr is okay with that. CM will notify [facility] of discharge. Also CM clarified with dtr the pt already has a RW.

## 2024-12-12 NOTE — PROGRESS NOTES
Hospital Medicine Progress Note  V 10.25      Date of Admission: 12/7/2024    Hospital Day: 2      Chief Admission Complaint:  I had fallen    Subjective:  resting in bed, no complaints    Presenting Admission History:         89 y.o. female with hx of gerd, hypothyroid, htn, essential tremor, dementia who presented to [facility] with weakness and fall.  Pt lives at [facility] in independent living but with frequent care from family.  Pt was in the bathroom today bruising her teeth and daughter suddenly heard pt scream out , 'oh my hip.'  Pt was found on the ground and 'help' button pushed. Aide came and helped pt up to chair in bathroom and then EMS called.  Pt was brought in for evaluation.  -of late, pt was on hctz but was stopped by PCP due to electrolyte abn per family. It was restarted by family around thanksgiving time due to worsening leg/feet swelling to where even shoes would not fit.   -pt also notes more restless legs of late in the past few days but has been intermittent for the past few weeks  -family is thinking of increasing care at facility     Ryley- daughter who was with pt when she fell  Genromina- daughter who is aware of pt's medications  Araceli-MARLENY     ER course: CT head and CT pelvis ordered, labs obtained and noted sodium of 122    Assessment/Plan:      Current Principal Problem:  Hyponatremia      Hyponatremia- 122 on admission, ?related to HCTZ use  -nephro consulted, started on 2% saline, ok with loop diuretics in the future  -trended labs  -challenged with ivfs  -check Ua, urine lytes, serm/urine osm ordered     Gen weakness- with fall, CT head w/o acute abn, CT pelvis w/o acute abn, noted 2.7cm cyst in left adexa  -Pt/ot eval ordered, rec snf     HTN- stable  -on propranolol     Anemia-normocytic, 11.3 on admission  -monitored labs     Possible RLS?- started low dose hyroxizine prn     Dementia- namenda, risperdone, zoloft continued     Ess tremors- ongoing 
  Hospital Medicine Progress Note  V 10.25      Date of Admission: 12/7/2024    Hospital Day: 5      Chief Admission Complaint:  I had fallen    Subjective:  no needs expressed at this time, states she just wants to sleep    Presenting Admission History: \"89 y.o. female with hx of gerd, hypothyroid, htn, essential tremor, dementia who presented to [facility] with weakness and fall.  Pt lives at [facility] in independent living but with frequent care from family.  Pt was in the bathroom today bruising her teeth and daughter suddenly heard pt scream out , 'oh my hip.'  Pt was found on the ground and 'help' button pushed. Aide came and helped pt up to chair in bathroom and then EMS called.  Pt was brought in for evaluation.  -of late, pt was on hctz but was stopped by PCP due to electrolyte abn per family. It was restarted by family around thanksgiving time due to worsening leg/feet swelling to where even shoes would not fit.   -pt also notes more restless legs of late in the past few days but has been intermittent for the past few weeks  -family is thinking of increasing care at facility  Ryley- daughter who was with pt when she fell  Genie- daughter who is aware of pt's medications  Araceli-HCPOA  ER course: CT head and CT pelvis ordered, labs obtained and noted sodium of 122\"    Assessment/Plan:      Current Principal Problem:  Hyponatremia    Hyponatremia, improved.  Na was 122 on admission, it is currently 134.  Received 2% saline per nephrology initially.  IVF were stopped 12/8.  HCTZ at home use possibly contributing.  UA bland.  Urine creat 21, omol 293, Na 84.  KH is following: continue 1L fluid restriction,      Essential hypertension.  Continue propanolol.  Monitor    Normocytic anemia.  Hgb was 11.3 on arrival, repeat the next day was 11.      Possible RLS.  Continue risperidone    Dementia.  Continue home dose namenda, primidone    Hypothyroidism.  Clinically euthyroid.  TSH noted 
  Physician Progress Note      PATIENT:               RUPALI ANDERSON  CSN #:                  [id number]  :                       1935  ADMIT DATE:       2024 12:24 PM  DISCH DATE:  RESPONDING  PROVIDER #:        Dean Stokes MD          QUERY TEXT:    Pt admitted with fall and has dementia documented.  Pt noted to be agitated   per nursing note . If possible, please document in the progress notes and   discharge summary if you are evaluating and / or treating any of the   following:    The medical record reflects the following:  Risk Factors: dementia  Clinical Indicators:  Nursing note -  \"Patient extremely restless, climbing out of bed, and unable to be redirected   since 2300. Multiple medications given with no change in behavior. Order for   bilat wrist restrains obtained and patient placed in them for patient and   staff safety.\"  Treatment: Restraints, sitter, avasys, zyprexa, geodon, supportive care    Thank you,  Selena Christine, RN, BSN, MARIYA  Options provided:  -- Dementia with behavioral disturbance, agitation  -- Other - I will add my own diagnosis  -- Disagree - Not applicable / Not valid  -- Disagree - Clinically unable to determine / Unknown  -- Refer to Clinical Documentation Reviewer    PROVIDER RESPONSE TEXT:    This patient has dementia with behavioral disturbance, agitiation    Query created by: Selena Christine on 2024 9:20 AM      Electronically signed by:  Dean Stokes MD 12/10/2024 9:33 PM          
  [facility] Progress Note           Subjective/   89 y.o. year old female who we are seeing in consultation for Hyponatremia.     HPI:  Sodium levels better with course of concentrated saline.  Denies any shortness of breath, on RA.    ROS:  +weak, intake adequate.    Objective/   GEN:  Chronically ill, /69   Pulse 65   Temp 97.3 °F (36.3 °C) (Axillary)   Resp 16   Ht 1.6 m (5' 3\")   Wt 72.1 kg (158 lb 15.2 oz)   SpO2 93%   BMI 28.16 kg/m²   HEENT: non-icteric, no JVD  CV: S1, S2 without m/r/g; + LE edema  RESP: CTA B without w/r/r; breathing wnl  ABD: +bs, soft, nt, no hsm  SKIN: warm, no rashes    Data/  Recent Labs     12/07/24  1259 12/08/24  0534   WBC 8.4 5.9   HGB 11.3* 11.0*   HCT 34.1* 32.6*   MCV 97.8 96.2    211     Recent Labs     12/07/24  1259 12/07/24  1553 12/07/24  1758 12/08/24  0534   *  --  124* 128*   K 3.7 3.6 3.4* 3.5   CL 87*  --  87* 93*   CO2 24  --  26 23   GLUCOSE 101*  --  98 92   MG  --   --  1.44* 1.45*   BUN 14  --  12 10   CREATININE 0.7  --  0.6 0.6   LABGLOM 83  --  86 86     Urine sodium 84    Assessment/     - Hyponatremia - acute, impaired urinary dilution in setting of hctz exposure   SNa 122 on admission, prior 126-134 from Oct/Nov 2024, wnl prior to that from 2023 readings     - Hypokalemia - on maintenance K replacement    - Hypomagnesemia - prn replacement     - Hypertension      Plan/     - Trial off of ~2% saline today  - Goal correction of sodium 6-8  meq/24 hours, 134-136 range by AM  - Can use loop diuretics if needed for edema  - Trend labs, bp's, weights, & urine output, f/u urine osmolality      ____________________________________  Cheo Motley MD  [facility]  [email or web address]  Office: [phone]    
  [facility] Progress Note           Subjective/   89 y.o. year old female who we are seeing in consultation for Hyponatremia.     HPI:  The patient was seen and examined; she feels well today with no CP, SOB, nausea or vomiting.    ROS: No fever or chills.  Social: No family at bedside.    Objective/   GEN:  Chronically ill, /64   Pulse 58   Temp 97.2 °F (36.2 °C) (Axillary)   Resp 18   Ht 1.6 m (5' 3\")   Wt 68.8 kg (151 lb 9.6 oz)   SpO2 92%   BMI 26.85 kg/m²     HEENT: non-icteric, no JVD  CV: S1, S2 without m/r/g; + LE edema  RESP: CTA B without w/r/r; breathing wnl  ABD: +bs, soft, nt, no hsm  SKIN: warm, no rashes    Data/  Recent Labs     12/07/24  1259 12/08/24  0534   WBC 8.4 5.9   HGB 11.3* 11.0*   HCT 34.1* 32.6*   MCV 97.8 96.2    211     Recent Labs     12/08/24  0534 12/08/24  1728 12/09/24  0520   * 129* 130*   K 3.5 3.7 3.5   CL 93* 94* 94*   CO2 23 25 27   GLUCOSE 92 91 104*   MG 1.45* 2.20 2.11   BUN 10 13 10   CREATININE 0.6 0.7 0.7   LABGLOM 86 83 83     Urine sodium 84    Assessment/     - Hyponatremia - acute, impaired urinary dilution in setting of hctz exposure   SNa 122 on admission, prior 126-134 from Oct/Nov 2024, wnl prior to that from 2023 readings     - Hypokalemia - on maintenance K replacement    - Hypomagnesemia - prn replacement     - Hypertension      Plan/     - Monitor off of IV fluids     - Daily free water restriction at 32 ounces    - Trend labs, bp's, weights, & urine output      ____________________________________  Yadi Mccann MD  [facility]  [email or web address]  Office: [phone]    
  [facility] Progress Note           Subjective/   89 y.o. year old female who we are seeing in consultation for Hyponatremia.     HPI:  The patient was seen and examined; she feels well today with no CP, SOB, nausea or vomiting.    ROS: No fever or chills.  Social: No family at bedside.    Objective/   GEN:  Chronically ill, /66   Pulse 59   Temp 98.1 °F (36.7 °C) (Oral)   Resp 18   Ht 1.6 m (5' 3\")   Wt 69.4 kg (153 lb 1.6 oz)   SpO2 95%   BMI 27.12 kg/m²     HEENT: non-icteric, no JVD  CV: S1, S2 without m/r/g; + LE edema  RESP: CTA B without w/r/r; breathing wnl  ABD: +bs, soft, nt, no hsm  SKIN: warm, no rashes    Data/  Recent Labs     12/08/24  0534   WBC 5.9   HGB 11.0*   HCT 32.6*   MCV 96.2        Recent Labs     12/08/24  0534 12/08/24  1728 12/09/24  0520 12/10/24  0552   * 129* 130* 132*   K 3.5 3.7 3.5 4.2   CL 93* 94* 94* 98*   CO2 23 25 27 27   GLUCOSE 92 91 104* 99   MG 1.45* 2.20 2.11  --    BUN 10 13 10 16   CREATININE 0.6 0.7 0.7 0.8   LABGLOM 86 83 83 71     Urine sodium 84    Assessment/     - Hyponatremia - acute, impaired urinary dilution in setting of hctz exposure   SNa 122 on admission, prior 126-134 from Oct/Nov 2024, wnl prior to that from 2023 readings     - Hypokalemia - on maintenance K replacement    - Hypomagnesemia - prn replacement     - Hypertension      Plan/     - Monitor off of IV fluids     - Daily free water restriction at 32 ounces    - Trend labs, bp's, weights, & urine output      ____________________________________  Yadi Mccann MD  [facility]  [email or web address]  Office: [phone]    
  [facility] Progress Note           Subjective/   89 y.o. year old female who we are seeing in consultation for Hyponatremia.     HPI:  The patient was seen and examined; she was resting comfortably with no acute events noted overnight.    ROS: No fever or chills.  Social: No family at bedside.    Objective/   GEN:  Chronically ill, /64   Pulse 61   Temp 98.5 °F (36.9 °C) (Oral)   Resp 16   Ht 1.6 m (5' 3\")   Wt 69 kg (152 lb 1.9 oz)   SpO2 95%   BMI 26.95 kg/m²     HEENT: non-icteric, no JVD  CV: S1, S2 without m/r/g; + LE edema  RESP: CTA B without w/r/r; breathing wnl  ABD: +bs, soft, nt, no hsm  SKIN: warm, no rashes    Data/  Recent Labs     12/12/24  0518   WBC 5.8   HGB 11.2*   HCT 32.8*   MCV 97.9          Recent Labs     12/10/24  0552 12/11/24  0544 12/12/24  0518   * 134* 131*   K 4.2 4.5 4.3   CL 98* 99 96*   CO2 27 26 27   GLUCOSE 99 105* 98   BUN 16 15 14   CREATININE 0.8 0.8 0.6   LABGLOM 71 71 86     Urine sodium 84    Assessment/     - Hyponatremia - acute, impaired urinary dilution in setting of hctz exposure   SNa 122 on admission, prior 126-134 from Oct/Nov 2024, wnl prior to that from 2023 readings     - Hypokalemia - on maintenance K replacement    - Hypomagnesemia - prn replacement     - Hypertension      Plan/     - Continue daily free water restriction    - Trend labs, bp's, weights, & urine output      ____________________________________  Yadi Mccann MD  [facility]  [email or web address]  Office: [phone]    
  [facility] Progress Note           Subjective/   89 y.o. year old female who we are seeing in consultation for Hyponatremia.     HPI:  The patient was seen and examined; she was resting comfortably with no acute events noted overnight.    ROS: No fever or chills.  Social: No family at bedside.    Objective/   GEN:  Chronically ill, BP (!) 152/76   Pulse 63   Temp 97.8 °F (36.6 °C) (Oral)   Resp 18   Ht 1.6 m (5' 3\")   Wt 70 kg (154 lb 4.8 oz)   SpO2 91%   BMI 27.33 kg/m²     HEENT: non-icteric, no JVD  CV: S1, S2 without m/r/g; + LE edema  RESP: CTA B without w/r/r; breathing wnl  ABD: +bs, soft, nt, no hsm  SKIN: warm, no rashes    Data/  No results for input(s): \"WBC\", \"HGB\", \"HCT\", \"MCV\", \"PLT\" in the last 72 hours.    Recent Labs     12/08/24  1728 12/09/24  0520 12/10/24  0552 12/11/24  0544   * 130* 132* 134*   K 3.7 3.5 4.2 4.5   CL 94* 94* 98* 99   CO2 25 27 27 26   GLUCOSE 91 104* 99 105*   MG 2.20 2.11  --   --    BUN 13 10 16 15   CREATININE 0.7 0.7 0.8 0.8   LABGLOM 83 83 71 71     Urine sodium 84    Assessment/     - Hyponatremia - acute, impaired urinary dilution in setting of hctz exposure   SNa 122 on admission, prior 126-134 from Oct/Nov 2024, wnl prior to that from 2023 readings     - Hypokalemia - on maintenance K replacement    - Hypomagnesemia - prn replacement     - Hypertension      Plan/     - Daily free water restriction at 32 ounces    - Trend labs, bp's, weights, & urine output      ____________________________________  Yadi Mccann MD  [facility]  [email or web address]  Office: [phone]    
Attempted to call [facility] to give report. I was told they would call me back. OUSMANE complete. AVS printed.   
Family unable to stay overnight. Concerned that pt will not be redirectable with AVASYS. Order for sitter obtained. Charge nurse made aware. Jayashree Ortega RN   
[place] nurse returned call. Report given. Pt. To transport to [place] with daughter in personal vehicle. All belongings at bedside. VS stable. AVS/OUSMANE completed and in chart.   
Occupational Therapy  Facility/Department: [facility]  Daily Treatment Note  NAME: Radha Magallon  : 1935  MRN: [id number]    Date of Service: 2024    Discharge Recommendations:  Subacute/Skilled Nursing Facility       Therapy discharge recommendations are subject to collaboration from the patient’s interdisciplinary healthcare team, including MD and case management recommendations.  Barriers to Home Discharge:   [] Steps to access home entry or bed/bath   [x] Unable to transfer, ambulate, or propel wheelchair household distances without assist   [x] Limited available assist at home upon discharge (pt lives alone in indep senior living apartment)   [x] Patient or family requests d/c to post-acute facility    [x] Poor cognition/safety awareness for d/c to home alone    [] Unable to maintain ordered weight bearing status    [] Patient with salient signs of long-standing immobility   [x] Decreased independence with ADLs   [x] Increased risk for falls    Patient Diagnosis(es): The primary encounter diagnosis was Hyponatremia. Diagnoses of Fall, initial encounter, Weakness of both legs, History of dementia, and Goals of care, counseling/discussion were also pertinent to this visit.     Assessment   Assessment: Pt demos good tolerance of OT treatment and good progress toward OT goals this session. Pt continues to be limited by baseline cognitive deficits, requiring reorientation and frequent verbal cues for completion of ADL tasks. Pt grossly CGA for all functional transfers and mobility using RW this session. Pt tolerating UE therex well without rest breaks. Pt functioning below her baseline, continue to recommend SNF at d/c.   Activity Tolerance: Patient tolerated treatment well  Discharge Recommendations: Subacute/Skilled Nursing Facility     Plan  Occupational Therapy Plan  Times Per Week: 3-5x/ week    Restrictions  Restrictions/Precautions  Restrictions/Precautions: General Precautions;Fall 
Occupational Therapy  Facility/Department: [facility] C4 U  Occupational Therapy Initial Assessment    Name: Radha Magallon  : 1935  MRN: [id number]  Date of Service: 2024    Discharge Recommendations:  Subacute/Skilled Nursing Facility  Therapy discharge recommendations take into account each patient's current medical complexities and are subject to input/oversight from the patient's healthcare team.   Barriers to Home Discharge:      [x] Unable to transfer, ambulate, or propel wheelchair household distances without assist   [x] Limited available assist at home upon discharge    [x] amily requests d/c to post-acute facility    [x] Poor cognition/safety awareness for d/c to home alone       If pt is unable to be seen after this session, please let this note serve as discharge summary.  Please see case management note for discharge disposition.  Thank you.           Patient Diagnosis(es): The primary encounter diagnosis was Hyponatremia. Diagnoses of Fall, initial encounter, Weakness of both legs, History of dementia, and Goals of care, counseling/discussion were also pertinent to this visit.  Past Medical History:  has a past medical history of Acid fast bacillus, Bronchiectasis (HCC), Dementia (HCC), DJD (degenerative joint disease) of knee, Environmental allergies, Hemoptysis, HTN (hypertension), benign, Hyperlipidemia, Hypothyroidism, Lung disease, Mycobacterial infection, non-TB, Pneumonia, Pulmonary nodule, and Tremor, essential.  Past Surgical History:  has a past surgical history that includes Tonsillectomy; Eye surgery; and bronchoscopy.           Assessment  Performance deficits / Impairments: Decreased functional mobility ;Decreased ADL status;Decreased balance;Decreased strength;Decreased safe awareness;Decreased cognition  Assessment: pt from [facility], normally independent with BADL's& functional mobility with 4 WW  except family assist with showers; pt admitted s/p 
Patient extremely restless, climbing out of bed, and unable to be redirected since 2300. Multiple medications given with no change in behavior. Order for bilat wrist restrains obtained and patient placed in them for patient and staff safety. Emergency contact Barbara Ramos notified of restraints.     1:1 sitter at bedside.   
Patient still restless, sat in chair for approximately 10 minutes before wanting to get up again. Patient taken to bathroom and then put in wheelchair and taken around unit. Frequent reorientation and distraction by staff.    
Physical Therapy  Facility/Department: [facility] C4 PCU  Daily Treatment Note  NAME: Radha Magallon  : 1935  MRN: [id number]    Date of Service: 12/10/2024    Discharge Recommendations:  Subacute/Skilled Nursing Facility   PT Equipment Recommendations  Equipment Needed: No  Other: TBD at SNF    Therapy discharge recommendations are subject to collaboration from the patient’s interdisciplinary healthcare team, including MD and case management recommendations.    Barriers to Home Discharge:   [] Steps to access home entry or bed/bath:   [x] Unable to transfer, ambulate, or propel wheelchair household distances without assist   [x] Limited available assist at home upon discharge (pt lives alone in indep senior living apartment)   [x] Patient or family requests d/c to post-acute facility    [x] Poor cognition/safety awareness for d/c to home alone    [] Unable to maintain ordered weight bearing status    [] Patient with salient signs of long-standing immobility   [x] Decreased independence with ADLs   [x] Increased risk for falls    If pt is unable to be seen after this session, please let this note serve as discharge summary.  Please see case management note for discharge disposition.  Thank you.    Patient Diagnosis(es): The primary encounter diagnosis was Hyponatremia. Diagnoses of Fall, initial encounter, Weakness of both legs, History of dementia, and Goals of care, counseling/discussion were also pertinent to this visit.    Assessment  Assessment: Pt participated well when seen for PT follow-up this date.  Pt progressing well since last seen by PT, now performing functional mobility tasks with min/CGA x 1 with support of RW.  Pt able to amb markedly increased distance (125 feet) today vs. previous PT session although still with occasional unsteadiness and requiring cues for safety & direction.  Pt still limited in overall strength and level of (I) with mobility compared to baseline LOF.  Continue to recommend 
Physical Therapy  Facility/Department: [facility] C4 PCU  Physical Therapy Initial Assessment    Name: Radha Magallon  : 1935  MRN: [id number]  Date of Service: 2024    Discharge Recommendations:  Subacute/Skilled Nursing Facility   PT Equipment Recommendations  Equipment Needed: No  Other: defer to next level of care.      Therapy discharge recommendations take into account each patient's current medical complexities and are subject to input/oversight from the patient's healthcare team.   Barriers to Home Discharge:   [] Steps to access home entry or bed/bath:   [x] Unable to transfer, ambulate, or propel wheelchair household distances without assist   [x] Unable to perform ADLs without assist   [x] Limited available assist at home upon discharge    [] Patient or family requests d/c to post-acute facility    [x] Poor cognition/safety awareness for d/c to home alone    [] Unable to maintain ordered weight bearing status    [] Patient with salient signs of long-standing immobility   [x] Other:  pt at increased risk for falls.    Patient Diagnosis(es): The primary encounter diagnosis was Hyponatremia. Diagnoses of Fall, initial encounter, Weakness of both legs, History of dementia, and Goals of care, counseling/discussion were also pertinent to this visit.  Past Medical History:  has a past medical history of Acid fast bacillus, Bronchiectasis (HCC), Dementia (HCC), DJD (degenerative joint disease) of knee, Environmental allergies, Hemoptysis, HTN (hypertension), benign, Hyperlipidemia, Hypothyroidism, Lung disease, Mycobacterial infection, non-TB, Pneumonia, Pulmonary nodule, and Tremor, essential.  Past Surgical History:  has a past surgical history that includes Tonsillectomy; Eye surgery; and bronchoscopy.    Assessment  Assessment: Pt seen in conjunction with OT to maximize pt safety and mobility and safely assess pt maximal level of mobility.  Pt presents to [facility] with primary diagnosis of hyponatremia, 
Pt arrived to room 441 from ED. Pt confused, family at bedside. VSS. Pt and family oriented to unit, room and call light. Brief wet. Brief and purwick changed and connected to suction. Pt consistently trying to get out of bed to urinate, needed many reminders that she can urinate with purwick. [place] alarm placed under pt, as bed alarm does not connect to wall. AVASYS ordered for safety. Jayashree Ortega RN   
UA obtained and sent for urinary frequency     
[]Other -      Code status: DNR-CCA    PT/OT Eval Status:   []NOT yet ordered  []Ordered and Pending   [x]Seen with Recommendations for:   []Home independently  []Home w/ assist  []HHC  [x]SNF  []Acute Rehab    Multi-Disciplinary Rounds with Case Management completed on 12/9/2024 with the following recommendations:  Anticipated Discharge Location: []Home w/ []HHC vs [x]SNF  []Acute Rehab  []LTAC  []Hospice  []Other -    Anticipated Discharge Day/Date:  12/10?  Barriers to Discharge: Pending workup, nephro recs, stable sodium,  --------------------------------------------------    MDM (any 2 required for High level billing)    A. Problems (any 1)  [x] Acute/Chronic Illness/injury posing ongoing threat to life and/or bodily function without ongoing treatment    [] Severe exacerbation of chronic illness    --------------------------------------------------  B. Risk of Treatment (any 1)    [] Drugs/treatments that require intensive monitoring for toxicity    [] IV ABX (Vancomycin, Aminoglycosides, etc)     [] Post-Cath/Contrast study requiring serial monitoring    [] IV Narcotic analgesia    [] Aggressive IV diuresis    [] Hypertonic Saline    [] Critical electrolyte abnormalities requiring IV replacement    [] Insulin - Scheduled/SSI or Insulin gtt    [] Anticoagulation (Heparin gtt or Coumadin - other anticoagulants in special circumstances)    [] Cardiac Medications (IV Amiodarone/Diltiazem, Tikosyn, etc)    [] Hemodialysis    [] Other -    [] Change in code status    [] Decision to escalate care    [] Major surgery/procedure with associated risk factors    --------------------------------------------------  C. Data (any 2)    [x] Data Review (any 3)    [x] Consultant notes from yesterday/today    [x] All available current labs reviewed interpreted for clinical significance    [x] Appropriate follow-up labs were ordered  [] Collateral history obtained     [x] Independent Interpretation of tests (any 1)    [x] 
importance of OOB activity  Education Method: Demonstration;Verbal  Barriers to Learning: Cognition  Education Outcome: Continued education needed;Verbalized understanding    Goals  Short Term Goals  Time Frame for Short Term Goals: 1 week(12-15-24); ongoing  Short Term Goal 1: min assist with bathroom mobility with RW by 12-14-24  Short Term Goal 2: CGA standing ADL's for 3 minutes at sink by 12-15-24; GOAL MET 12/10, continue goal for consistency  Short Term Goal 3: CGA with functional/toilet transfers  Short Term Goal 4: tolerate 15 reps BUE exercises to increase strength for transfers/ADL's  Patient Goals   Patient goals : \"get stronger\"    AM-PAC - ADL  AM-PAC Daily Activity - Inpatient   How much help is needed for putting on and taking off regular lower body clothing?: A Lot  How much help is needed for bathing (which includes washing, rinsing, drying)?: A Lot  How much help is needed for toileting (which includes using toilet, bedpan, or urinal)?: A Lot  How much help is needed for putting on and taking off regular upper body clothing?: A Lot  How much help is needed for taking care of personal grooming?: A Little  How much help for eating meals?: A Little  AM-PAC Inpatient Daily Activity Raw Score: 14  AM-PAC Inpatient ADL T-Scale Score : 33.39  ADL Inpatient CMS 0-100% Score: 59.67  ADL Inpatient CMS G-Code Modifier : CK    Therapy Time   Individual Concurrent Group Co-treatment   Time In 0827         Time Out 0851         Minutes 24         Timed Code Treatment Minutes: 24 Minutes     If pt is discharged prior to next OT session, this note will serve as the discharge summary.    Rebeka Jorgensen OT     
personally reviewed and interpreted        [] Imaging personally reviewed and interpreted     [x] Discussion (any 1)  [x] Multi-Disciplinary Rounds with Case Management  [] Discussed management of the case with           Labs:  Personally reviewed on 12/10/2024 and interpreted for clinical significance as documented above.     Recent Labs     12/08/24  0534   WBC 5.9   HGB 11.0*   HCT 32.6*        Recent Labs     12/08/24  0534 12/08/24  1728 12/09/24  0520 12/10/24  0552   * 129* 130* 132*   K 3.5 3.7 3.5 4.2   CL 93* 94* 94* 98*   CO2 23 25 27 27   BUN 10 13 10 16   CREATININE 0.6 0.7 0.7 0.8   CALCIUM 8.2* 8.4 8.8 8.4   MG 1.45* 2.20 2.11  --      No results for input(s): \"PROBNP\", \"TROPHS\" in the last 72 hours.  No results for input(s): \"LABA1C\" in the last 72 hours.  No results for input(s): \"AST\", \"ALT\", \"BILIDIR\", \"BILITOT\", \"ALKPHOS\" in the last 72 hours.  No results for input(s): \"INR\", \"LACTA\", \"TSH\" in the last 72 hours.    Urine Cultures:   Lab Results   Component Value Date/Time    LABURIN  11/12/2024 12:20 PM     <50,000 CFU/ml mixed skin/urogenital tara. No further workup     Blood Cultures: No results found for: \"BC\"  No results found for: \"BLOODCULT2\"  Organism: No results found for: \"ORG\"      Dean Stokes MD  
(4) no impairment

## 2024-12-12 NOTE — DISCHARGE INSTR - COC
Continuity of Care Form    Patient Name: Radha Magallon   :  1935  MRN:  [id number]    Admit date:  2024  Discharge date:  24    Code Status Order: Limited   Advance Directives:   Advance Care Flowsheet Documentation             Admitting Physician:  Dean Stokes MD  PCP: Alec Andrew MD    Discharging Nurse: Nadine [facility]asarging [facility] Unit/Room#: 0441/0441-01  Discharging Unit Phone Number: [phone]    Emergency Contact:   Extended Emergency Contact Information  Primary Emergency Contact: lula bustos  Home Phone: [phone]  Relation: Child   needed? No  Secondary Emergency Contact: CHINO MUNOZ  Address: [facility]  Home Phone: [phone]  Mobile Phone: [phone]  Relation: Child    Past Surgical History:  Past Surgical History:   Procedure Laterality Date    BRONCHOSCOPY      EYE SURGERY      eye lid spot removed    TONSILLECTOMY         Immunization History:   Immunization History   Administered Date(s) Administered    COVID-19, MODERNA BLUE border, Primary or Immunocompromised, (age 12y+), IM, 100 mcg/0.5mL 2021, 2021    COVID-19, MODERNA Booster BLUE border, (age 18y+), IM, 50mcg/0.25mL 2021    COVID-19, US Vaccine, Vaccine Unspecified 2021, 2021, 2021    Influenza, FLUAD, (age 65 y+), IM, Trivalent PF, 0.5mL 09/10/2019    Influenza, FLUARIX, FLULAVAL, FLUZONE (age 6 mo+) and AFLURIA, (age 3 y+), Quadv PF, 0.5mL 2015, 2017    Influenza, FLUZONE High Dose, (age 65 y+), IM, Trivalent PF, 0.5mL 10/26/2017, 10/17/2018    Pneumococcal, PCV-13, PREVNAR 13, (age 6w+), IM, 0.5mL 10/17/2018    Pneumococcal, PPSV23, PNEUMOVAX 23, (age 2y+), SC/IM, 0.5mL 2015       Active Problems:  Patient Active Problem List   Diagnosis Code    Constipation K59.00    Hypothyroidism E03.9    Hypercholesterolemia E78.00    Family history of colon cancer Z80.0    Seasonal allergies J30.2    Pulmonary infiltrate

## 2024-12-13 ENCOUNTER — CARE COORDINATION (OUTPATIENT)
Dept: CASE MANAGEMENT | Age: 89
End: 2024-12-13

## 2024-12-13 NOTE — CARE COORDINATION
Care Transitions Note    Initial Call - Call within 2 business days of discharge: Yes    Attempted to reach patient for transitions of care follow up. Unable to reach patient.    Outreach Attempts:   HIPAA compliant voicemail left for patient.     Patient: Radha Magallon    Patient : 1935   MRN: [id number]    Reason for Admission: hyponatremia, fall with generalized weakness and pain, [facility]  Discharge Date: 24  RURS: Readmission Risk Score: 10.4    Last Discharge Facility       Date Complaint Diagnosis Description Type Department Provider    24 Fall Hyponatremia ... ED to Hosp-Admission (Discharged) (ADMITTED) Colt Minor MD; Ghislaine Martell ...            Was this an external facility discharge? No    Follow Up Appointment:   Patient does not have a follow up appointment scheduled at time of call.  Will attempt to reach patient at a later time.       Plan for follow-up on next business day.      ADAM Salinas, RN   Care Transition Nurse  Mobile: [phone]

## 2024-12-16 ENCOUNTER — CARE COORDINATION (OUTPATIENT)
Dept: CASE MANAGEMENT | Age: 89
End: 2024-12-16

## 2024-12-16 NOTE — CARE COORDINATION
Care Transitions Note    Initial Call - Call within 2 business days of discharge: Yes    Attempted to reach patient for transitions of care follow up. Unable to reach patient.    Outreach Attempts:   Unable to leave message.  First number listed does not have a vm set up. Second number cannot be completed as dialed.     Patient: Radha Magallon    Patient : 1935   MRN: [id number]    Reason for Admission: fall, hyponatremia  Discharge Date: 24  RURS: Readmission Risk Score: 10.4    Last Discharge Facility       Date Complaint Diagnosis Description Type Department Provider    24 Fall Hyponatremia ... ED to Hosp-Admission (Discharged) (ADMITTED) Colt Minor MD; Ghislaine Martell ...            Was this an external facility discharge? No    Follow Up Appointment:   Patient does not have a follow up appointment scheduled at time of call.  UTR pt Will route message to PCP to facilitate Hospital follow up appointment.        No further follow-up call indicated     ADAM Salinas, RN   Care Transition Nurse  Mobile: [phone]

## 2024-12-28 ENCOUNTER — HOSPITAL ENCOUNTER (INPATIENT)
Age: 89
LOS: 11 days | Discharge: NURSING, REHAB OR LONG-TERM CARE | DRG: 689 | End: 2025-01-09
Attending: EMERGENCY MEDICINE | Admitting: INTERNAL MEDICINE
Payer: MEDICARE

## 2024-12-28 DIAGNOSIS — Z51.5 HOSPICE CARE: ICD-10-CM

## 2024-12-28 DIAGNOSIS — R79.89 ELEVATED BRAIN NATRIURETIC PEPTIDE (BNP) LEVEL: ICD-10-CM

## 2024-12-28 DIAGNOSIS — F02.80 DEMENTIA DUE TO ALZHEIMER'S DISEASE (HCC): ICD-10-CM

## 2024-12-28 DIAGNOSIS — G30.9 DEMENTIA DUE TO ALZHEIMER'S DISEASE (HCC): ICD-10-CM

## 2024-12-28 DIAGNOSIS — U07.1 COVID: Primary | ICD-10-CM

## 2024-12-28 DIAGNOSIS — R79.89 ELEVATED TROPONIN: ICD-10-CM

## 2024-12-29 ENCOUNTER — APPOINTMENT (OUTPATIENT)
Dept: GENERAL RADIOLOGY | Age: 89
DRG: 689 | End: 2024-12-29
Payer: MEDICARE

## 2024-12-29 PROBLEM — U07.1 COVID-19 VIREMIA: Status: ACTIVE | Noted: 2024-12-29

## 2024-12-29 LAB
ALBUMIN SERPL-MCNC: 4 G/DL (ref 3.4–5)
ALBUMIN/GLOB SERPL: 1.3 {RATIO} (ref 1.1–2.2)
ALP SERPL-CCNC: 76 U/L (ref 40–129)
ALT SERPL-CCNC: 13 U/L (ref 10–40)
ANION GAP SERPL CALCULATED.3IONS-SCNC: 15 MMOL/L (ref 3–16)
AST SERPL-CCNC: 24 U/L (ref 15–37)
BACTERIA URNS QL MICRO: ABNORMAL /HPF
BASE EXCESS BLDA CALC-SCNC: -2 MMOL/L (ref -3–3)
BASOPHILS # BLD: 0.1 K/UL (ref 0–0.2)
BASOPHILS NFR BLD: 1.4 %
BILIRUB SERPL-MCNC: 0.5 MG/DL (ref 0–1)
BILIRUB UR QL STRIP.AUTO: NEGATIVE
BUN SERPL-MCNC: 15 MG/DL (ref 7–20)
CA-I BLD-SCNC: 1.15 MMOL/L (ref 1.12–1.32)
CALCIUM SERPL-MCNC: 9.2 MG/DL (ref 8.3–10.6)
CHLORIDE SERPL-SCNC: 95 MMOL/L (ref 99–110)
CLARITY UR: ABNORMAL
CO2 BLDA-SCNC: 22 MMOL/L
CO2 SERPL-SCNC: 20 MMOL/L (ref 21–32)
COLOR UR: YELLOW
CREAT SERPL-MCNC: 0.8 MG/DL (ref 0.6–1.2)
CRP SERPL-MCNC: 10 MG/L (ref 0–5.1)
DEPRECATED RDW RBC AUTO: 13 % (ref 12.4–15.4)
EKG ATRIAL RATE: 64 BPM
EKG ATRIAL RATE: 78 BPM
EKG DIAGNOSIS: NORMAL
EKG DIAGNOSIS: NORMAL
EKG P AXIS: 74 DEGREES
EKG P AXIS: 93 DEGREES
EKG P-R INTERVAL: 162 MS
EKG P-R INTERVAL: 172 MS
EKG Q-T INTERVAL: 376 MS
EKG Q-T INTERVAL: 424 MS
EKG QRS DURATION: 100 MS
EKG QRS DURATION: 92 MS
EKG QTC CALCULATION (BAZETT): 428 MS
EKG QTC CALCULATION (BAZETT): 437 MS
EKG R AXIS: 39 DEGREES
EKG R AXIS: 53 DEGREES
EKG T AXIS: 50 DEGREES
EKG T AXIS: 61 DEGREES
EKG VENTRICULAR RATE: 64 BPM
EKG VENTRICULAR RATE: 78 BPM
EOSINOPHIL # BLD: 0 K/UL (ref 0–0.6)
EOSINOPHIL NFR BLD: 0.6 %
EPI CELLS #/AREA URNS HPF: ABNORMAL /HPF (ref 0–5)
FLUAV RNA RESP QL NAA+PROBE: NOT DETECTED
FLUBV RNA RESP QL NAA+PROBE: NOT DETECTED
GFR SERPLBLD CREATININE-BSD FMLA CKD-EPI: 71 ML/MIN/{1.73_M2}
GLUCOSE BLD-MCNC: 128 MG/DL (ref 70–99)
GLUCOSE BLD-MCNC: 149 MG/DL (ref 70–99)
GLUCOSE SERPL-MCNC: 105 MG/DL (ref 70–99)
GLUCOSE UR STRIP.AUTO-MCNC: NEGATIVE MG/DL
HCO3 BLDA-SCNC: 21.5 MMOL/L (ref 21–29)
HCT VFR BLD AUTO: 38.8 % (ref 36–48)
HCT VFR BLD AUTO: 39 % (ref 36–48)
HGB BLD CALC-MCNC: 13.4 GM/DL (ref 12–16)
HGB BLD-MCNC: 13.3 G/DL (ref 12–16)
HGB UR QL STRIP.AUTO: NEGATIVE
KETONES UR STRIP.AUTO-MCNC: 40 MG/DL
LACTATE BLD-SCNC: 1.18 MMOL/L (ref 0.4–2)
LEUKOCYTE ESTERASE UR QL STRIP.AUTO: ABNORMAL
LYMPHOCYTES # BLD: 0.4 K/UL (ref 1–5.1)
LYMPHOCYTES NFR BLD: 5.3 %
MCH RBC QN AUTO: 33.4 PG (ref 26–34)
MCHC RBC AUTO-ENTMCNC: 34.4 G/DL (ref 31–36)
MCV RBC AUTO: 97.2 FL (ref 80–100)
MONOCYTES # BLD: 1.2 K/UL (ref 0–1.3)
MONOCYTES NFR BLD: 17.3 %
NEUTROPHILS # BLD: 5.3 K/UL (ref 1.7–7.7)
NEUTROPHILS NFR BLD: 75.4 %
NITRITE UR QL STRIP.AUTO: NEGATIVE
NT-PROBNP SERPL-MCNC: 4251 PG/ML (ref 0–449)
PCO2 BLDA: 29.6 MM HG (ref 35–45)
PERFORMED ON: ABNORMAL
PERFORMED ON: ABNORMAL
PH BLDA: 7.47 [PH] (ref 7.35–7.45)
PH UR STRIP.AUTO: 7.5 [PH] (ref 5–8)
PLATELET # BLD AUTO: 243 K/UL (ref 135–450)
PMV BLD AUTO: 7.7 FL (ref 5–10.5)
PO2 BLDA: 80.1 MM HG (ref 75–108)
POC SAMPLE TYPE: ABNORMAL
POTASSIUM BLD-SCNC: 3.5 MMOL/L (ref 3.5–5.1)
POTASSIUM SERPL-SCNC: 4 MMOL/L (ref 3.5–5.1)
PROT SERPL-MCNC: 7.2 G/DL (ref 6.4–8.2)
PROT UR STRIP.AUTO-MCNC: NEGATIVE MG/DL
RBC # BLD AUTO: 3.99 M/UL (ref 4–5.2)
RBC #/AREA URNS HPF: ABNORMAL /HPF (ref 0–4)
SAO2 % BLDA: 97 % (ref 93–100)
SARS-COV-2 RNA RESP QL NAA+PROBE: DETECTED
SODIUM BLD-SCNC: 132 MMOL/L (ref 136–145)
SODIUM SERPL-SCNC: 130 MMOL/L (ref 136–145)
SP GR UR STRIP.AUTO: 1.02 (ref 1–1.03)
TROPONIN, HIGH SENSITIVITY: 28 NG/L (ref 0–14)
TROPONIN, HIGH SENSITIVITY: 30 NG/L (ref 0–14)
UA COMPLETE W REFLEX CULTURE PNL UR: ABNORMAL
UA DIPSTICK W REFLEX MICRO PNL UR: YES
URN SPEC COLLECT METH UR: ABNORMAL
UROBILINOGEN UR STRIP-ACNC: 0.2 E.U./DL
WBC # BLD AUTO: 7 K/UL (ref 4–11)
WBC #/AREA URNS HPF: ABNORMAL /HPF (ref 0–5)

## 2024-12-29 PROCEDURE — 36415 COLL VENOUS BLD VENIPUNCTURE: CPT

## 2024-12-29 PROCEDURE — 1200000000 HC SEMI PRIVATE

## 2024-12-29 PROCEDURE — 6370000000 HC RX 637 (ALT 250 FOR IP): Performed by: NURSE PRACTITIONER

## 2024-12-29 PROCEDURE — 97162 PT EVAL MOD COMPLEX 30 MIN: CPT

## 2024-12-29 PROCEDURE — 97166 OT EVAL MOD COMPLEX 45 MIN: CPT

## 2024-12-29 PROCEDURE — 83605 ASSAY OF LACTIC ACID: CPT

## 2024-12-29 PROCEDURE — 83880 ASSAY OF NATRIURETIC PEPTIDE: CPT

## 2024-12-29 PROCEDURE — 97116 GAIT TRAINING THERAPY: CPT

## 2024-12-29 PROCEDURE — 82803 BLOOD GASES ANY COMBINATION: CPT

## 2024-12-29 PROCEDURE — 2500000003 HC RX 250 WO HCPCS: Performed by: NURSE PRACTITIONER

## 2024-12-29 PROCEDURE — 94640 AIRWAY INHALATION TREATMENT: CPT

## 2024-12-29 PROCEDURE — 93005 ELECTROCARDIOGRAM TRACING: CPT | Performed by: INTERNAL MEDICINE

## 2024-12-29 PROCEDURE — 87086 URINE CULTURE/COLONY COUNT: CPT

## 2024-12-29 PROCEDURE — 97110 THERAPEUTIC EXERCISES: CPT

## 2024-12-29 PROCEDURE — 85014 HEMATOCRIT: CPT

## 2024-12-29 PROCEDURE — 71045 X-RAY EXAM CHEST 1 VIEW: CPT

## 2024-12-29 PROCEDURE — 6360000002 HC RX W HCPCS: Performed by: NURSE PRACTITIONER

## 2024-12-29 PROCEDURE — 80053 COMPREHEN METABOLIC PANEL: CPT

## 2024-12-29 PROCEDURE — 84295 ASSAY OF SERUM SODIUM: CPT

## 2024-12-29 PROCEDURE — 82330 ASSAY OF CALCIUM: CPT

## 2024-12-29 PROCEDURE — 87636 SARSCOV2 & INF A&B AMP PRB: CPT

## 2024-12-29 PROCEDURE — 93005 ELECTROCARDIOGRAM TRACING: CPT | Performed by: PHYSICIAN ASSISTANT

## 2024-12-29 PROCEDURE — 93010 ELECTROCARDIOGRAM REPORT: CPT | Performed by: INTERNAL MEDICINE

## 2024-12-29 PROCEDURE — 97530 THERAPEUTIC ACTIVITIES: CPT

## 2024-12-29 PROCEDURE — 84484 ASSAY OF TROPONIN QUANT: CPT

## 2024-12-29 PROCEDURE — 85025 COMPLETE CBC W/AUTO DIFF WBC: CPT

## 2024-12-29 PROCEDURE — 82947 ASSAY GLUCOSE BLOOD QUANT: CPT

## 2024-12-29 PROCEDURE — 2580000003 HC RX 258: Performed by: INTERNAL MEDICINE

## 2024-12-29 PROCEDURE — 86140 C-REACTIVE PROTEIN: CPT

## 2024-12-29 PROCEDURE — 84132 ASSAY OF SERUM POTASSIUM: CPT

## 2024-12-29 PROCEDURE — 99285 EMERGENCY DEPT VISIT HI MDM: CPT

## 2024-12-29 PROCEDURE — 81001 URINALYSIS AUTO W/SCOPE: CPT

## 2024-12-29 RX ORDER — PANTOPRAZOLE SODIUM 40 MG/1
40 TABLET, DELAYED RELEASE ORAL
Status: DISCONTINUED | OUTPATIENT
Start: 2024-12-29 | End: 2025-01-09 | Stop reason: HOSPADM

## 2024-12-29 RX ORDER — RISPERIDONE 0.25 MG/1
0.5 TABLET ORAL NIGHTLY
Status: DISCONTINUED | OUTPATIENT
Start: 2024-12-29 | End: 2025-01-02

## 2024-12-29 RX ORDER — MEMANTINE HYDROCHLORIDE 5 MG/1
10 TABLET ORAL 2 TIMES DAILY
Status: DISCONTINUED | OUTPATIENT
Start: 2024-12-29 | End: 2025-01-09 | Stop reason: HOSPADM

## 2024-12-29 RX ORDER — PRIMIDONE 50 MG/1
50 TABLET ORAL 2 TIMES DAILY
Status: DISCONTINUED | OUTPATIENT
Start: 2024-12-29 | End: 2024-12-30

## 2024-12-29 RX ORDER — POLYETHYLENE GLYCOL 3350 17 G/17G
17 POWDER, FOR SOLUTION ORAL DAILY PRN
Status: DISCONTINUED | OUTPATIENT
Start: 2024-12-29 | End: 2025-01-09 | Stop reason: HOSPADM

## 2024-12-29 RX ORDER — ALBUTEROL SULFATE 90 UG/1
2 INHALANT RESPIRATORY (INHALATION) EVERY 4 HOURS
Status: DISCONTINUED | OUTPATIENT
Start: 2024-12-29 | End: 2024-12-29

## 2024-12-29 RX ORDER — SODIUM CHLORIDE 0.9 % (FLUSH) 0.9 %
5-40 SYRINGE (ML) INJECTION EVERY 12 HOURS SCHEDULED
Status: DISCONTINUED | OUTPATIENT
Start: 2024-12-29 | End: 2025-01-09 | Stop reason: HOSPADM

## 2024-12-29 RX ORDER — SODIUM CHLORIDE 9 MG/ML
INJECTION, SOLUTION INTRAVENOUS PRN
Status: DISCONTINUED | OUTPATIENT
Start: 2024-12-29 | End: 2025-01-09 | Stop reason: HOSPADM

## 2024-12-29 RX ORDER — ONDANSETRON 2 MG/ML
4 INJECTION INTRAMUSCULAR; INTRAVENOUS EVERY 6 HOURS PRN
Status: DISCONTINUED | OUTPATIENT
Start: 2024-12-29 | End: 2025-01-09 | Stop reason: HOSPADM

## 2024-12-29 RX ORDER — LEVOTHYROXINE SODIUM 125 UG/1
125 TABLET ORAL DAILY
Status: DISCONTINUED | OUTPATIENT
Start: 2024-12-29 | End: 2025-01-09 | Stop reason: HOSPADM

## 2024-12-29 RX ORDER — ONDANSETRON 4 MG/1
4 TABLET, ORALLY DISINTEGRATING ORAL EVERY 8 HOURS PRN
Status: DISCONTINUED | OUTPATIENT
Start: 2024-12-29 | End: 2025-01-09 | Stop reason: HOSPADM

## 2024-12-29 RX ORDER — ALBUTEROL SULFATE 90 UG/1
2 INHALANT RESPIRATORY (INHALATION)
Status: DISCONTINUED | OUTPATIENT
Start: 2024-12-29 | End: 2025-01-04

## 2024-12-29 RX ORDER — 0.9 % SODIUM CHLORIDE 0.9 %
500 INTRAVENOUS SOLUTION INTRAVENOUS ONCE
Status: COMPLETED | OUTPATIENT
Start: 2024-12-29 | End: 2024-12-29

## 2024-12-29 RX ORDER — SODIUM CHLORIDE 0.9 % (FLUSH) 0.9 %
5-40 SYRINGE (ML) INJECTION PRN
Status: DISCONTINUED | OUTPATIENT
Start: 2024-12-29 | End: 2025-01-09 | Stop reason: HOSPADM

## 2024-12-29 RX ORDER — ENOXAPARIN SODIUM 100 MG/ML
40 INJECTION SUBCUTANEOUS DAILY
Status: DISCONTINUED | OUTPATIENT
Start: 2024-12-29 | End: 2025-01-05

## 2024-12-29 RX ORDER — ACETAMINOPHEN 650 MG/1
650 SUPPOSITORY RECTAL EVERY 6 HOURS PRN
Status: DISCONTINUED | OUTPATIENT
Start: 2024-12-29 | End: 2025-01-09 | Stop reason: HOSPADM

## 2024-12-29 RX ORDER — PROPRANOLOL HYDROCHLORIDE 10 MG/1
20 TABLET ORAL 4 TIMES DAILY
Status: DISCONTINUED | OUTPATIENT
Start: 2024-12-29 | End: 2025-01-09 | Stop reason: HOSPADM

## 2024-12-29 RX ORDER — GUAIFENESIN/DEXTROMETHORPHAN 100-10MG/5
5 SYRUP ORAL EVERY 4 HOURS PRN
Status: DISCONTINUED | OUTPATIENT
Start: 2024-12-29 | End: 2025-01-09 | Stop reason: HOSPADM

## 2024-12-29 RX ORDER — ACETAMINOPHEN 325 MG/1
650 TABLET ORAL EVERY 6 HOURS PRN
Status: DISCONTINUED | OUTPATIENT
Start: 2024-12-29 | End: 2025-01-09 | Stop reason: HOSPADM

## 2024-12-29 RX ADMIN — Medication 2 PUFF: at 08:40

## 2024-12-29 RX ADMIN — WATER 1000 MG: 1 INJECTION INTRAMUSCULAR; INTRAVENOUS; SUBCUTANEOUS at 05:34

## 2024-12-29 RX ADMIN — LEVOTHYROXINE SODIUM 125 MCG: 0.12 TABLET ORAL at 05:28

## 2024-12-29 RX ADMIN — PANTOPRAZOLE SODIUM 40 MG: 40 TABLET, DELAYED RELEASE ORAL at 05:28

## 2024-12-29 RX ADMIN — PROPRANOLOL HYDROCHLORIDE 20 MG: 40 TABLET ORAL at 10:56

## 2024-12-29 RX ADMIN — SODIUM CHLORIDE, PRESERVATIVE FREE 10 ML: 5 INJECTION INTRAVENOUS at 22:56

## 2024-12-29 RX ADMIN — MEMANTINE 10 MG: 5 TABLET ORAL at 22:50

## 2024-12-29 RX ADMIN — PRIMIDONE 50 MG: 50 TABLET ORAL at 22:50

## 2024-12-29 RX ADMIN — ENOXAPARIN SODIUM 40 MG: 100 INJECTION SUBCUTANEOUS at 10:57

## 2024-12-29 RX ADMIN — MEMANTINE 10 MG: 5 TABLET ORAL at 10:57

## 2024-12-29 RX ADMIN — PRIMIDONE 50 MG: 50 TABLET ORAL at 10:56

## 2024-12-29 RX ADMIN — SODIUM CHLORIDE 500 ML: 9 INJECTION, SOLUTION INTRAVENOUS at 13:39

## 2024-12-29 RX ADMIN — SERTRALINE HYDROCHLORIDE 50 MG: 50 TABLET ORAL at 10:56

## 2024-12-29 RX ADMIN — RISPERIDONE 0.5 MG: 0.25 TABLET, FILM COATED ORAL at 22:50

## 2024-12-29 ASSESSMENT — PAIN SCALES - GENERAL
PAINLEVEL_OUTOF10: 0
PAINLEVEL_OUTOF10: 0

## 2024-12-29 NOTE — ED NOTES
Radha Magallon is a 89 y.o. 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 *  .   Patient Assisted Living via family with   Chief Complaint   Patient presents with    Fatigue     Per EMS called for generalized weakness. EMS states that family feels like patient current status is \"her normal.\" Glucose 115. DNRCC per EMS.   .  Patient is alert and Person, Place, Time, and Situation  Patient's baseline mobility: Baseline Mobility: Walker  Code Status: Prior   Cardiac Rhythm:       Is patient on baseline Oxygen: no       Isolation: Droplet      NIH Score:    C-SSRS: Risk of Suicide: No Risk  Bedside swallow:        Active LDA's:   Peripheral IV 12/28/24 Left Antecubital (Active)     Patient admitted with a dye: no       Family/Caregiver Present yes Any Concerns: no   Restraints no  Sitter no         Vitals: MEWS Score: 1    Vitals:    12/29/24 0204 12/29/24 0230 12/29/24 0230 12/29/24 0330   BP: (!) 141/72 (!) 166/91 (!) 166/91 112/65   Pulse: 76 79 79 75   Resp: 20 27 27 26   Temp:    97.6 °F (36.4 °C)   TempSrc:    Oral   SpO2: 100% 100% 100% 99%       Last documented pain score (0-10 scale)    Pain medication administered no    Pertinent or High Risk Medications/Drips: No.    Pending Blood Product Administration: no    Abnormal labs:   Abnormal Labs Reviewed   COVID-19 & INFLUENZA COMBO - Abnormal; Notable for the following components:       Result Value    SARS-CoV-2 RNA, RT PCR DETECTED (*)     All other components within normal limits   CBC WITH AUTO DIFFERENTIAL - Abnormal; Notable for the following components:    RBC 3.99 (*)     Lymphocytes Absolute 0.4 (*)     All other components within normal limits   COMPREHENSIVE METABOLIC PANEL W/ REFLEX TO MG FOR LOW K - Abnormal; Notable for the following components:    Sodium 130 (*)     Chloride 95 (*)     CO2 20 (*)     Glucose 105 (*)     All other components within normal limits   URINALYSIS

## 2024-12-29 NOTE — ED NOTES
Pt assisted to bedside commode with this RN and family member. Pt was weak and unsteady on her feet.

## 2024-12-29 NOTE — H&P
administration of intravenous contrast. Automated exposure control, iterative reconstruction, and/or weight based adjustment of the mA/kV was utilized to reduce the radiation dose to as low as reasonably achievable. COMPARISON: MRI 02/19/2019 HISTORY: ORDERING SYSTEM PROVIDED HISTORY: fall, possibly hit head TECHNOLOGIST PROVIDED HISTORY: Reason for exam:->fall, possibly hit head Has a \"code stroke\" or \"stroke alert\" been called?->No Decision Support Exception - unselect if not a suspected or confirmed emergency medical condition->Emergency Medical Condition (MA) Reason for Exam: Fall today with pelvic pain FINDINGS: BRAIN/VENTRICLES: There is no acute intracranial hemorrhage, mass effect or midline shift.  No abnormal extra-axial fluid collection.  The gray-white differentiation is maintained without evidence of an acute infarct.  There is no evidence of hydrocephalus. Patchy and confluent areas of low-attenuation in the periventricular and subcortical white matter likely related to chronic small vessel ischemic changes. ORBITS: The visualized portion of the orbits demonstrate no acute abnormality. SINUSES: The visualized paranasal sinuses and mastoid air cells demonstrate no acute abnormality. SOFT TISSUES/SKULL:  No acute abnormality of the visualized skull or soft tissues.     No acute intracranial abnormality.     CT PELVIS WO CONTRAST Additional Contrast? None    Result Date: 12/7/2024  EXAMINATION: CT OF THE PELVIS WITHOUT CONTRAST 12/7/2024 1:59 pm TECHNIQUE: CT of the pelvis was performed without the administration of intravenous contrast.  Multiplanar reformatted images are provided for review. Adjustment of mA and/or kV according to patient size was utilized.  Automated exposure control, iterative reconstruction, and/or weight based adjustment of the mA/kV was utilized to reduce the radiation dose to as low as reasonably achievable. COMPARISON: None. HISTORY ORDERING SYSTEM PROVIDED HISTORY: hip pain s/p

## 2024-12-30 LAB
25(OH)D3 SERPL-MCNC: 20.6 NG/ML
ANION GAP SERPL CALCULATED.3IONS-SCNC: 14 MMOL/L (ref 3–16)
BACTERIA UR CULT: NORMAL
BASOPHILS # BLD: 0.1 K/UL (ref 0–0.2)
BASOPHILS NFR BLD: 2 %
BUN SERPL-MCNC: 17 MG/DL (ref 7–20)
CALCIUM SERPL-MCNC: 8.6 MG/DL (ref 8.3–10.6)
CHLORIDE SERPL-SCNC: 97 MMOL/L (ref 99–110)
CO2 SERPL-SCNC: 21 MMOL/L (ref 21–32)
CREAT SERPL-MCNC: 0.7 MG/DL (ref 0.6–1.2)
CRP SERPL-MCNC: 28.8 MG/L (ref 0–5.1)
DEPRECATED RDW RBC AUTO: 13.1 % (ref 12.4–15.4)
EOSINOPHIL # BLD: 0 K/UL (ref 0–0.6)
EOSINOPHIL NFR BLD: 0 %
GFR SERPLBLD CREATININE-BSD FMLA CKD-EPI: 83 ML/MIN/{1.73_M2}
GLUCOSE SERPL-MCNC: 91 MG/DL (ref 70–99)
HCT VFR BLD AUTO: 36 % (ref 36–48)
HGB BLD-MCNC: 12.4 G/DL (ref 12–16)
LYMPHOCYTES # BLD: 1.4 K/UL (ref 1–5.1)
LYMPHOCYTES NFR BLD: 27 %
MAGNESIUM SERPL-MCNC: 1.62 MG/DL (ref 1.8–2.4)
MCH RBC QN AUTO: 33.3 PG (ref 26–34)
MCHC RBC AUTO-ENTMCNC: 34.3 G/DL (ref 31–36)
MCV RBC AUTO: 96.9 FL (ref 80–100)
MONOCYTES # BLD: 1.6 K/UL (ref 0–1.3)
MONOCYTES NFR BLD: 30 %
NEUTROPHILS # BLD: 2.1 K/UL (ref 1.7–7.7)
NEUTROPHILS NFR BLD: 32 %
NEUTS BAND NFR BLD MANUAL: 9 % (ref 0–7)
PATH INTERP BLD-IMP: NORMAL
PATH INTERP BLD-IMP: YES
PLATELET # BLD AUTO: 205 K/UL (ref 135–450)
PLATELET BLD QL SMEAR: ADEQUATE
PMV BLD AUTO: 7.9 FL (ref 5–10.5)
POTASSIUM SERPL-SCNC: 3.5 MMOL/L (ref 3.5–5.1)
RBC # BLD AUTO: 3.72 M/UL (ref 4–5.2)
RBC MORPH BLD: NORMAL
SLIDE REVIEW: ABNORMAL
SODIUM SERPL-SCNC: 132 MMOL/L (ref 136–145)
WBC # BLD AUTO: 5.2 K/UL (ref 4–11)

## 2024-12-30 PROCEDURE — 82306 VITAMIN D 25 HYDROXY: CPT

## 2024-12-30 PROCEDURE — 6370000000 HC RX 637 (ALT 250 FOR IP): Performed by: NURSE PRACTITIONER

## 2024-12-30 PROCEDURE — 80048 BASIC METABOLIC PNL TOTAL CA: CPT

## 2024-12-30 PROCEDURE — 2500000003 HC RX 250 WO HCPCS: Performed by: NURSE PRACTITIONER

## 2024-12-30 PROCEDURE — 1200000000 HC SEMI PRIVATE

## 2024-12-30 PROCEDURE — 85025 COMPLETE CBC W/AUTO DIFF WBC: CPT

## 2024-12-30 PROCEDURE — 83735 ASSAY OF MAGNESIUM: CPT

## 2024-12-30 PROCEDURE — 94640 AIRWAY INHALATION TREATMENT: CPT

## 2024-12-30 PROCEDURE — 6370000000 HC RX 637 (ALT 250 FOR IP)

## 2024-12-30 PROCEDURE — 6360000002 HC RX W HCPCS: Performed by: NURSE PRACTITIONER

## 2024-12-30 PROCEDURE — 51798 US URINE CAPACITY MEASURE: CPT

## 2024-12-30 PROCEDURE — 86140 C-REACTIVE PROTEIN: CPT

## 2024-12-30 RX ORDER — PRIMIDONE 50 MG/1
50 TABLET ORAL 3 TIMES DAILY
COMMUNITY

## 2024-12-30 RX ORDER — PRIMIDONE 50 MG/1
50 TABLET ORAL 3 TIMES DAILY
Status: DISCONTINUED | OUTPATIENT
Start: 2024-12-30 | End: 2025-01-09 | Stop reason: HOSPADM

## 2024-12-30 RX ADMIN — Medication 2 PUFF: at 08:46

## 2024-12-30 RX ADMIN — PROPRANOLOL HYDROCHLORIDE 20 MG: 40 TABLET ORAL at 17:29

## 2024-12-30 RX ADMIN — MEMANTINE 10 MG: 5 TABLET ORAL at 08:43

## 2024-12-30 RX ADMIN — ENOXAPARIN SODIUM 40 MG: 100 INJECTION SUBCUTANEOUS at 08:42

## 2024-12-30 RX ADMIN — WATER 1000 MG: 1 INJECTION INTRAMUSCULAR; INTRAVENOUS; SUBCUTANEOUS at 05:31

## 2024-12-30 RX ADMIN — SODIUM CHLORIDE, PRESERVATIVE FREE 10 ML: 5 INJECTION INTRAVENOUS at 09:31

## 2024-12-30 RX ADMIN — SODIUM CHLORIDE, PRESERVATIVE FREE 10 ML: 5 INJECTION INTRAVENOUS at 08:43

## 2024-12-30 RX ADMIN — TIOTROPIUM BROMIDE INHALATION SPRAY 2 PUFF: 3.12 SPRAY, METERED RESPIRATORY (INHALATION) at 08:46

## 2024-12-30 RX ADMIN — Medication 2 PUFF: at 12:00

## 2024-12-30 RX ADMIN — Medication 2 PUFF: at 15:55

## 2024-12-30 RX ADMIN — PRIMIDONE 50 MG: 50 TABLET ORAL at 15:37

## 2024-12-30 RX ADMIN — Medication 1 LOZENGE: at 00:21

## 2024-12-30 RX ADMIN — PRIMIDONE 50 MG: 50 TABLET ORAL at 08:43

## 2024-12-30 RX ADMIN — Medication 2 PUFF: at 19:28

## 2024-12-30 RX ADMIN — PROPRANOLOL HYDROCHLORIDE 20 MG: 40 TABLET ORAL at 13:57

## 2024-12-30 RX ADMIN — GUAIFENESIN AND DEXTROMETHORPHAN 5 ML: 100; 10 SYRUP ORAL at 05:21

## 2024-12-30 RX ADMIN — LEVOTHYROXINE SODIUM 125 MCG: 0.12 TABLET ORAL at 05:20

## 2024-12-30 RX ADMIN — Medication 1 LOZENGE: at 05:20

## 2024-12-30 RX ADMIN — PANTOPRAZOLE SODIUM 40 MG: 40 TABLET, DELAYED RELEASE ORAL at 05:20

## 2024-12-30 RX ADMIN — SODIUM CHLORIDE, PRESERVATIVE FREE 10 ML: 5 INJECTION INTRAVENOUS at 20:54

## 2024-12-30 RX ADMIN — SERTRALINE HYDROCHLORIDE 50 MG: 50 TABLET ORAL at 08:43

## 2024-12-30 RX ADMIN — ACETAMINOPHEN 650 MG: 325 TABLET, FILM COATED ORAL at 13:57

## 2024-12-30 RX ADMIN — RISPERIDONE 0.5 MG: 0.25 TABLET, FILM COATED ORAL at 20:52

## 2024-12-30 RX ADMIN — PROPRANOLOL HYDROCHLORIDE 20 MG: 40 TABLET ORAL at 20:52

## 2024-12-30 RX ADMIN — MEMANTINE 10 MG: 5 TABLET ORAL at 20:52

## 2024-12-30 RX ADMIN — PRIMIDONE 50 MG: 50 TABLET ORAL at 20:52

## 2024-12-30 ASSESSMENT — PAIN SCALES - GENERAL: PAINLEVEL_OUTOF10: 0

## 2024-12-30 NOTE — CARE COORDINATION
CASE MANAGEMENT DISCHARGE SUMMARY      Discharge to: [facility] with Supportive Home Care      IMM given: 12/30    New Durable Medical Equipment ordered/agency: none    Transportation: private     Confirmed discharge plan with:     Patient: yes     Family:  yes son Too at bedside stated dtr Yesenia burroughs her way in     Facility/Agency, name:  [facility] faxed to [facility] [phone] [place] [phone]   Phone number for report to facility: [phone]     RN, name: Lucila Muñoz RN

## 2024-12-30 NOTE — CARE COORDINATION
Case Management Assessment  Initial Evaluation    Date/Time of Evaluation: 12/30/2024 12:26 PM  Assessment Completed by: Elisabeth Muñoz RN    If patient is discharged prior to next notation, then this note serves as note for discharge by case management.    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 Yes    Chart Reviewed: Yes      History Provided by: Child/Family (Yesenia)  Patient Orientation: Unable to Assess (in isolation spoke with daughter Yesenia via phone)    Patient Cognition: Dementia / Early Alzheimer's    Hospitalization in the last 30 days (Readmission):  Yes    If yes, Readmission Assessment in  Navigator will be completed.    Advance Directives:      Code Status: DNR-CCA   Patient's Primary Decision Maker is: Legal Next of Kin    Primary Decision Maker: yesenia bustos - Child - [phone]    Secondary Decision Maker: TAMMYMAIRAI - Child - [phone]    Discharge Planning:    Patient lives with: Alone Type of Home: Assisted living  Primary Care Giver: Private caregiver (JANKI cramer.)  Patient Support Systems include: Children, Family Members   Current Financial resources: Medicare  Current community resources: Assisted Living  Current services prior to admission: Extended Care Placement, Other (Comment) ([facility])            Current DME:              Type of Home Care services:  None    ADLS  Prior functional level: Assistance with the following:, Bathing, Dressing, Toileting, Cooking, Mobility, Shopping, Housework  Current functional level: Assistance with the following:, Bathing, Dressing,

## 2024-12-30 NOTE — ACP (ADVANCE CARE PLANNING)
Advance Care Planning     General Advance Care Planning (ACP) Conversation    Date of Conversation: 12/30/2024  Conducted with: Patient with Decision Making Capacity and Healthcare Decision Maker  Other persons present: Daughter Yesenia    Healthcare Decision Maker:   Primary Decision Maker: yesenia bustos - Child - [phone]    Secondary Decision Maker: CHINO MUNOZ - Child - [phone]       Content/Action Overview:  Has ACP document(s) on file - reflects the patient's care preferences  Reviewed DNR/DNI and patient confirms current DNR status - completed forms on file (place new order if needed)      Length of Voluntary ACP Conversation in minutes:  <16 minutes (Non-Billable)    Elisabeth Muñoz RN

## 2024-12-30 NOTE — DISCHARGE INSTR - COC
Continuity of Care Form    Patient Name: Radha Magallon   :  1935  MRN:  [id number]    Admit date:  2024  Discharge date:  25    Code Status Order: DNR-CCA   Advance Directives:   Advance Care Flowsheet Documentation             Admitting Physician:  No admitting provider for patient encounter.  PCP: Baljeet Syed III, APRN - CNP    Discharging Nurse: Selam   Discharging Hospital Unit/Room#: 0348/0348-01  Discharging Unit Phone Number: [phone]    Emergency Contact:   Extended Emergency Contact Information  Primary Emergency Contact: lula bustos  Home Phone: [phone]  Relation: Child   needed? No  Secondary Emergency Contact: CHINO MUNOZ  Address: [facility]  Home Phone: [phone]  Mobile Phone: [phone]  Relation: Child    Past Surgical History:  Past Surgical History:   Procedure Laterality Date    BRONCHOSCOPY      EYE SURGERY      eye lid spot removed    TONSILLECTOMY         Immunization History:   Immunization History   Administered Date(s) Administered    COVID-19, MODERNA BLUE border, Primary or Immunocompromised, (age 12y+), IM, 100 mcg/0.5mL 2021, 2021    COVID-19, MODERNA Booster BLUE border, (age 18y+), IM, 50mcg/0.25mL 2021    COVID-19, US Vaccine, Vaccine Unspecified 2021, 2021, 2021    Influenza, FLUAD, (age 65 y+), IM, Trivalent PF, 0.5mL 09/10/2019    Influenza, FLUARIX, FLULAVAL, FLUZONE (age 6 mo+) and AFLURIA, (age 3 y+), Quadv PF, 0.5mL 2015, 2017    Influenza, FLUZONE High Dose, (age 65 y+), IM, Trivalent PF, 0.5mL 10/26/2017, 10/17/2018    Pneumococcal, PCV-13, PREVNAR 13, (age 6w+), IM, 0.5mL 10/17/2018    Pneumococcal, PPSV23, PNEUMOVAX 23, (age 2y+), SC/IM, 0.5mL 2015       Active Problems:  Patient Active Problem List   Diagnosis Code    Constipation K59.00    Hypothyroidism E03.9    Hypercholesterolemia E78.00    Family history of colon cancer Z80.0    Seasonal

## 2024-12-31 LAB
ANION GAP SERPL CALCULATED.3IONS-SCNC: 12 MMOL/L (ref 3–16)
BUN SERPL-MCNC: 13 MG/DL (ref 7–20)
CALCIUM SERPL-MCNC: 8.6 MG/DL (ref 8.3–10.6)
CHLORIDE SERPL-SCNC: 95 MMOL/L (ref 99–110)
CO2 SERPL-SCNC: 24 MMOL/L (ref 21–32)
CREAT SERPL-MCNC: 0.6 MG/DL (ref 0.6–1.2)
GFR SERPLBLD CREATININE-BSD FMLA CKD-EPI: 86 ML/MIN/{1.73_M2}
GLUCOSE SERPL-MCNC: 126 MG/DL (ref 70–99)
MAGNESIUM SERPL-MCNC: 1.49 MG/DL (ref 1.8–2.4)
POTASSIUM SERPL-SCNC: 3.3 MMOL/L (ref 3.5–5.1)
SODIUM SERPL-SCNC: 131 MMOL/L (ref 136–145)

## 2024-12-31 PROCEDURE — 97116 GAIT TRAINING THERAPY: CPT

## 2024-12-31 PROCEDURE — 83735 ASSAY OF MAGNESIUM: CPT

## 2024-12-31 PROCEDURE — 36415 COLL VENOUS BLD VENIPUNCTURE: CPT

## 2024-12-31 PROCEDURE — 6370000000 HC RX 637 (ALT 250 FOR IP)

## 2024-12-31 PROCEDURE — 6360000002 HC RX W HCPCS

## 2024-12-31 PROCEDURE — 97530 THERAPEUTIC ACTIVITIES: CPT

## 2024-12-31 PROCEDURE — 1200000000 HC SEMI PRIVATE

## 2024-12-31 PROCEDURE — 80048 BASIC METABOLIC PNL TOTAL CA: CPT

## 2024-12-31 PROCEDURE — 97535 SELF CARE MNGMENT TRAINING: CPT

## 2024-12-31 PROCEDURE — 2500000003 HC RX 250 WO HCPCS: Performed by: NURSE PRACTITIONER

## 2024-12-31 PROCEDURE — 94640 AIRWAY INHALATION TREATMENT: CPT

## 2024-12-31 PROCEDURE — 6360000002 HC RX W HCPCS: Performed by: NURSE PRACTITIONER

## 2024-12-31 PROCEDURE — 6370000000 HC RX 637 (ALT 250 FOR IP): Performed by: NURSE PRACTITIONER

## 2024-12-31 RX ORDER — FLUTICASONE PROPIONATE 50 MCG
1 SPRAY, SUSPENSION (ML) NASAL DAILY
Status: DISCONTINUED | OUTPATIENT
Start: 2024-12-31 | End: 2025-01-09 | Stop reason: HOSPADM

## 2024-12-31 RX ORDER — MAGNESIUM SULFATE IN WATER 40 MG/ML
2000 INJECTION, SOLUTION INTRAVENOUS ONCE
Status: COMPLETED | OUTPATIENT
Start: 2024-12-31 | End: 2024-12-31

## 2024-12-31 RX ADMIN — SERTRALINE HYDROCHLORIDE 50 MG: 50 TABLET ORAL at 07:43

## 2024-12-31 RX ADMIN — ENOXAPARIN SODIUM 40 MG: 100 INJECTION SUBCUTANEOUS at 07:43

## 2024-12-31 RX ADMIN — Medication 2 PUFF: at 21:14

## 2024-12-31 RX ADMIN — PROPRANOLOL HYDROCHLORIDE 20 MG: 40 TABLET ORAL at 17:21

## 2024-12-31 RX ADMIN — LEVOTHYROXINE SODIUM 125 MCG: 0.12 TABLET ORAL at 05:04

## 2024-12-31 RX ADMIN — Medication 2 PUFF: at 15:56

## 2024-12-31 RX ADMIN — SODIUM CHLORIDE, PRESERVATIVE FREE 10 ML: 5 INJECTION INTRAVENOUS at 07:45

## 2024-12-31 RX ADMIN — MEMANTINE 10 MG: 5 TABLET ORAL at 21:34

## 2024-12-31 RX ADMIN — PANTOPRAZOLE SODIUM 40 MG: 40 TABLET, DELAYED RELEASE ORAL at 05:04

## 2024-12-31 RX ADMIN — Medication 2 PUFF: at 11:51

## 2024-12-31 RX ADMIN — ACETAMINOPHEN 650 MG: 325 TABLET, FILM COATED ORAL at 12:50

## 2024-12-31 RX ADMIN — SODIUM CHLORIDE, PRESERVATIVE FREE 10 ML: 5 INJECTION INTRAVENOUS at 21:52

## 2024-12-31 RX ADMIN — PROPRANOLOL HYDROCHLORIDE 20 MG: 40 TABLET ORAL at 21:34

## 2024-12-31 RX ADMIN — PRIMIDONE 50 MG: 50 TABLET ORAL at 15:42

## 2024-12-31 RX ADMIN — RISPERIDONE 0.5 MG: 0.25 TABLET, FILM COATED ORAL at 21:34

## 2024-12-31 RX ADMIN — PROPRANOLOL HYDROCHLORIDE 20 MG: 40 TABLET ORAL at 07:43

## 2024-12-31 RX ADMIN — PRIMIDONE 50 MG: 50 TABLET ORAL at 07:43

## 2024-12-31 RX ADMIN — WATER 1000 MG: 1 INJECTION INTRAMUSCULAR; INTRAVENOUS; SUBCUTANEOUS at 05:00

## 2024-12-31 RX ADMIN — PRIMIDONE 50 MG: 50 TABLET ORAL at 21:34

## 2024-12-31 RX ADMIN — TIOTROPIUM BROMIDE INHALATION SPRAY 2 PUFF: 3.12 SPRAY, METERED RESPIRATORY (INHALATION) at 11:52

## 2024-12-31 RX ADMIN — MAGNESIUM SULFATE IN WATER 2000 MG: 40 INJECTION, SOLUTION INTRAVENOUS at 12:55

## 2024-12-31 RX ADMIN — PROPRANOLOL HYDROCHLORIDE 20 MG: 40 TABLET ORAL at 12:28

## 2024-12-31 RX ADMIN — FLUTICASONE PROPIONATE 1 SPRAY: 50 SPRAY, METERED NASAL at 08:20

## 2024-12-31 RX ADMIN — ACETAMINOPHEN 650 MG: 325 TABLET, FILM COATED ORAL at 06:42

## 2024-12-31 RX ADMIN — MEMANTINE 10 MG: 5 TABLET ORAL at 07:43

## 2024-12-31 RX ADMIN — ACETAMINOPHEN 650 MG: 325 TABLET, FILM COATED ORAL at 22:50

## 2024-12-31 ASSESSMENT — PAIN DESCRIPTION - ORIENTATION: ORIENTATION: INNER

## 2024-12-31 ASSESSMENT — PAIN DESCRIPTION - LOCATION: LOCATION: GROIN

## 2024-12-31 ASSESSMENT — PAIN - FUNCTIONAL ASSESSMENT: PAIN_FUNCTIONAL_ASSESSMENT: ACTIVITIES ARE NOT PREVENTED

## 2024-12-31 ASSESSMENT — PAIN DESCRIPTION - DESCRIPTORS: DESCRIPTORS: ACHING

## 2024-12-31 ASSESSMENT — PAIN SCALES - GENERAL: PAINLEVEL_OUTOF10: 8

## 2024-12-31 NOTE — CARE COORDINATION
Received call from daughter Yesenia. Stated would like palliative care consult. Already ordered. Spoke with Nesha with palliative, she will call Yesenia for meeting time. Elisabeth Muñoz RN    Spoke with Nesha with Palliative care meeting arranged for 1pm. Elisabeth Muñoz RN

## 2024-12-31 NOTE — CONSULTS
Consult Placed   Added to the treatment team  Who: Nesha Broussard  Date: 12/31/2024  Time: 7:34 AM       Electronically signed by Renate Desir on 12/31/2024 at 7:34 AM

## 2024-12-31 NOTE — CONSULTS
PALLIATIVE MEDICINE CONSULTATION     Patient name:Radha Magallon   MRN:[id number]    :1935  Room/Bed:0348/0348-01   LOS: 2 days         Date of consult:2024    Inpatient consult to Palliative Care  Consult performed by: Nesha Broussard APRN - CNP  Consult ordered by: Elliott Marie DO          Consult ordered for: goals of care    ASSESSMENT/RECOMMENDATIONS     89 y.o. female with Alzheimer's dementia (FAST 6c) and progressing disease. Was in independent living until just recently when she was hospitalized for a fall at home and was subsequently placed into assisted living.  She is readmitted 2 weeks later with COVID and UTI after being found slumped over in chair and minimally responsive.  Her weight is down 8 lbs over the past 2 weeks.  Family reports a reduced/inconsistent appetite.  No obvious signs of dysphagia, but family does report some coughing with food.  She is on room air and mental status is at baseline.     Patient is already [facility] with [facility]'s robust geriatric service program.      Number of hospital visits in past 12 months:  2  Last hospitalization: 24      Goals of Care:   Attempt to optimize with comfort as a priority.  Family hope to give patient the next 3 weeks at SNF to declare herself (assisted living vs hospice).  Code status is Limited x 4 Nos    Recommendations:   Ongoing GOC discussions, education and support for family   Community palliative care referral for immediate support and likely transition into hospice        Patient/Family Goals of Care Discussion :    24    Daughter Yesenia requested consult and 1pm family meeting. Met with patient's family for GOC discussion. Meeting was held outside of room away from patient due to concerns for inciting agitation.   Lisa Patterson Terry, and Brody present in person while Juan participated by phone.    Explained the role of palliative care.  Family all agreeable to

## 2024-12-31 NOTE — CARE COORDINATION
Palliative care meeting with Nesha and family completed. Concerned that patients care needs may be too great to return to [facility]. Would like referral to [facility]. Done.  Per Tanesha, they do have beds for skilled. However not in network with patients insurance. Will check to see if has similar OON benefits. Spoke with Nesha and family informed of above. Family stated they are interested in private paying for services. Left message for Tanesha waiting return call. Elisabeth Muñoz RN    Spoke with Tanesha, stated no OON benefits. Updated on desire for private pay. Stated will call [facility], will need to provide documentation of financial ability to pay. Elisabeth Muñoz RN    Spoke with Lucila JOHNSON. Stated family meant [facility]. Called Tanesha to update. She already knew the requested location from conversation with family. She has already emailed financial information needed to family for completion. That will all need worked out prior to admission. Elisabeth Muñoz RN

## 2025-01-01 LAB
ANION GAP SERPL CALCULATED.3IONS-SCNC: 9 MMOL/L (ref 3–16)
BUN SERPL-MCNC: 11 MG/DL (ref 7–20)
CALCIUM SERPL-MCNC: 8.5 MG/DL (ref 8.3–10.6)
CHLORIDE SERPL-SCNC: 96 MMOL/L (ref 99–110)
CO2 SERPL-SCNC: 25 MMOL/L (ref 21–32)
CREAT SERPL-MCNC: 0.6 MG/DL (ref 0.6–1.2)
DEPRECATED RDW RBC AUTO: 13 % (ref 12.4–15.4)
GFR SERPLBLD CREATININE-BSD FMLA CKD-EPI: 86 ML/MIN/{1.73_M2}
GLUCOSE SERPL-MCNC: 96 MG/DL (ref 70–99)
HCT VFR BLD AUTO: 35.3 % (ref 36–48)
HGB BLD-MCNC: 12.2 G/DL (ref 12–16)
MAGNESIUM SERPL-MCNC: 1.83 MG/DL (ref 1.8–2.4)
MCH RBC QN AUTO: 33.3 PG (ref 26–34)
MCHC RBC AUTO-ENTMCNC: 34.5 G/DL (ref 31–36)
MCV RBC AUTO: 96.4 FL (ref 80–100)
PLATELET # BLD AUTO: 200 K/UL (ref 135–450)
PMV BLD AUTO: 7.5 FL (ref 5–10.5)
POTASSIUM SERPL-SCNC: 3.5 MMOL/L (ref 3.5–5.1)
RBC # BLD AUTO: 3.66 M/UL (ref 4–5.2)
SODIUM SERPL-SCNC: 130 MMOL/L (ref 136–145)
WBC # BLD AUTO: 3.5 K/UL (ref 4–11)

## 2025-01-01 PROCEDURE — 1200000000 HC SEMI PRIVATE

## 2025-01-01 PROCEDURE — 94640 AIRWAY INHALATION TREATMENT: CPT

## 2025-01-01 PROCEDURE — 80048 BASIC METABOLIC PNL TOTAL CA: CPT

## 2025-01-01 PROCEDURE — 6370000000 HC RX 637 (ALT 250 FOR IP)

## 2025-01-01 PROCEDURE — 85027 COMPLETE CBC AUTOMATED: CPT

## 2025-01-01 PROCEDURE — 6370000000 HC RX 637 (ALT 250 FOR IP): Performed by: NURSE PRACTITIONER

## 2025-01-01 PROCEDURE — 6360000002 HC RX W HCPCS: Performed by: NURSE PRACTITIONER

## 2025-01-01 PROCEDURE — 83735 ASSAY OF MAGNESIUM: CPT

## 2025-01-01 PROCEDURE — 36415 COLL VENOUS BLD VENIPUNCTURE: CPT

## 2025-01-01 RX ADMIN — GUAIFENESIN AND DEXTROMETHORPHAN 5 ML: 100; 10 SYRUP ORAL at 21:20

## 2025-01-01 RX ADMIN — ACETAMINOPHEN 650 MG: 325 TABLET, FILM COATED ORAL at 21:19

## 2025-01-01 RX ADMIN — PROPRANOLOL HYDROCHLORIDE 20 MG: 40 TABLET ORAL at 09:08

## 2025-01-01 RX ADMIN — Medication 2 PUFF: at 12:22

## 2025-01-01 RX ADMIN — PROPRANOLOL HYDROCHLORIDE 20 MG: 40 TABLET ORAL at 15:35

## 2025-01-01 RX ADMIN — MEMANTINE 10 MG: 5 TABLET ORAL at 21:19

## 2025-01-01 RX ADMIN — ENOXAPARIN SODIUM 40 MG: 100 INJECTION SUBCUTANEOUS at 09:13

## 2025-01-01 RX ADMIN — PRIMIDONE 50 MG: 50 TABLET ORAL at 15:36

## 2025-01-01 RX ADMIN — FLUTICASONE PROPIONATE 1 SPRAY: 50 SPRAY, METERED NASAL at 09:14

## 2025-01-01 RX ADMIN — Medication 2 PUFF: at 21:28

## 2025-01-01 RX ADMIN — LEVOTHYROXINE SODIUM 125 MCG: 0.12 TABLET ORAL at 05:52

## 2025-01-01 RX ADMIN — SERTRALINE HYDROCHLORIDE 50 MG: 50 TABLET ORAL at 09:07

## 2025-01-01 RX ADMIN — TIOTROPIUM BROMIDE INHALATION SPRAY 2 PUFF: 3.12 SPRAY, METERED RESPIRATORY (INHALATION) at 08:31

## 2025-01-01 RX ADMIN — PRIMIDONE 50 MG: 50 TABLET ORAL at 21:19

## 2025-01-01 RX ADMIN — ACETAMINOPHEN 650 MG: 325 TABLET, FILM COATED ORAL at 09:07

## 2025-01-01 RX ADMIN — RISPERIDONE 0.5 MG: 0.25 TABLET, FILM COATED ORAL at 21:19

## 2025-01-01 RX ADMIN — PANTOPRAZOLE SODIUM 40 MG: 40 TABLET, DELAYED RELEASE ORAL at 05:52

## 2025-01-01 RX ADMIN — Medication 2 PUFF: at 15:28

## 2025-01-01 RX ADMIN — PROPRANOLOL HYDROCHLORIDE 20 MG: 40 TABLET ORAL at 18:52

## 2025-01-01 RX ADMIN — PRIMIDONE 50 MG: 50 TABLET ORAL at 09:08

## 2025-01-01 RX ADMIN — MEMANTINE 10 MG: 5 TABLET ORAL at 09:07

## 2025-01-01 RX ADMIN — Medication 2 PUFF: at 08:31

## 2025-01-02 LAB
ANION GAP SERPL CALCULATED.3IONS-SCNC: 9 MMOL/L (ref 3–16)
BUN SERPL-MCNC: 9 MG/DL (ref 7–20)
CALCIUM SERPL-MCNC: 8.6 MG/DL (ref 8.3–10.6)
CHLORIDE SERPL-SCNC: 97 MMOL/L (ref 99–110)
CO2 SERPL-SCNC: 26 MMOL/L (ref 21–32)
CREAT SERPL-MCNC: 0.6 MG/DL (ref 0.6–1.2)
DEPRECATED RDW RBC AUTO: 12.8 % (ref 12.4–15.4)
GFR SERPLBLD CREATININE-BSD FMLA CKD-EPI: 86 ML/MIN/{1.73_M2}
GLUCOSE SERPL-MCNC: 101 MG/DL (ref 70–99)
HCT VFR BLD AUTO: 35.2 % (ref 36–48)
HGB BLD-MCNC: 12.1 G/DL (ref 12–16)
MCH RBC QN AUTO: 33.2 PG (ref 26–34)
MCHC RBC AUTO-ENTMCNC: 34.4 G/DL (ref 31–36)
MCV RBC AUTO: 96.5 FL (ref 80–100)
PLATELET # BLD AUTO: 191 K/UL (ref 135–450)
PMV BLD AUTO: 7.6 FL (ref 5–10.5)
POTASSIUM SERPL-SCNC: 3.6 MMOL/L (ref 3.5–5.1)
RBC # BLD AUTO: 3.65 M/UL (ref 4–5.2)
SODIUM SERPL-SCNC: 132 MMOL/L (ref 136–145)
WBC # BLD AUTO: 3.1 K/UL (ref 4–11)

## 2025-01-02 PROCEDURE — 6370000000 HC RX 637 (ALT 250 FOR IP): Performed by: NURSE PRACTITIONER

## 2025-01-02 PROCEDURE — 6370000000 HC RX 637 (ALT 250 FOR IP)

## 2025-01-02 PROCEDURE — 97530 THERAPEUTIC ACTIVITIES: CPT

## 2025-01-02 PROCEDURE — 97535 SELF CARE MNGMENT TRAINING: CPT

## 2025-01-02 PROCEDURE — 36415 COLL VENOUS BLD VENIPUNCTURE: CPT

## 2025-01-02 PROCEDURE — 85027 COMPLETE CBC AUTOMATED: CPT

## 2025-01-02 PROCEDURE — 6360000002 HC RX W HCPCS: Performed by: NURSE PRACTITIONER

## 2025-01-02 PROCEDURE — 1200000000 HC SEMI PRIVATE

## 2025-01-02 PROCEDURE — 80048 BASIC METABOLIC PNL TOTAL CA: CPT

## 2025-01-02 PROCEDURE — 94640 AIRWAY INHALATION TREATMENT: CPT

## 2025-01-02 PROCEDURE — 97116 GAIT TRAINING THERAPY: CPT

## 2025-01-02 RX ORDER — RISPERIDONE 0.25 MG/1
0.75 TABLET ORAL NIGHTLY
Status: DISCONTINUED | OUTPATIENT
Start: 2025-01-02 | End: 2025-01-08

## 2025-01-02 RX ORDER — QUETIAPINE FUMARATE 25 MG/1
25 TABLET, FILM COATED ORAL ONCE
Status: COMPLETED | OUTPATIENT
Start: 2025-01-02 | End: 2025-01-02

## 2025-01-02 RX ADMIN — PROPRANOLOL HYDROCHLORIDE 20 MG: 40 TABLET ORAL at 13:43

## 2025-01-02 RX ADMIN — PANTOPRAZOLE SODIUM 40 MG: 40 TABLET, DELAYED RELEASE ORAL at 06:53

## 2025-01-02 RX ADMIN — TIOTROPIUM BROMIDE INHALATION SPRAY 2 PUFF: 3.12 SPRAY, METERED RESPIRATORY (INHALATION) at 07:49

## 2025-01-02 RX ADMIN — Medication 2 PUFF: at 01:17

## 2025-01-02 RX ADMIN — LEVOTHYROXINE SODIUM 125 MCG: 0.12 TABLET ORAL at 06:54

## 2025-01-02 RX ADMIN — QUETIAPINE FUMARATE 25 MG: 25 TABLET ORAL at 23:13

## 2025-01-02 RX ADMIN — PRIMIDONE 50 MG: 50 TABLET ORAL at 17:54

## 2025-01-02 RX ADMIN — ENOXAPARIN SODIUM 40 MG: 100 INJECTION SUBCUTANEOUS at 09:35

## 2025-01-02 RX ADMIN — Medication 2 PUFF: at 16:07

## 2025-01-02 RX ADMIN — SERTRALINE HYDROCHLORIDE 50 MG: 50 TABLET ORAL at 09:35

## 2025-01-02 RX ADMIN — PRIMIDONE 50 MG: 50 TABLET ORAL at 20:47

## 2025-01-02 RX ADMIN — GUAIFENESIN AND DEXTROMETHORPHAN 5 ML: 100; 10 SYRUP ORAL at 06:54

## 2025-01-02 RX ADMIN — PROPRANOLOL HYDROCHLORIDE 20 MG: 40 TABLET ORAL at 17:54

## 2025-01-02 RX ADMIN — MEMANTINE 10 MG: 5 TABLET ORAL at 09:35

## 2025-01-02 RX ADMIN — Medication 2 PUFF: at 12:03

## 2025-01-02 RX ADMIN — FLUTICASONE PROPIONATE 1 SPRAY: 50 SPRAY, METERED NASAL at 09:38

## 2025-01-02 RX ADMIN — Medication 2 PUFF: at 07:51

## 2025-01-02 RX ADMIN — PRIMIDONE 50 MG: 50 TABLET ORAL at 09:35

## 2025-01-02 RX ADMIN — Medication 2 PUFF: at 19:26

## 2025-01-02 RX ADMIN — RISPERIDONE 0.75 MG: 0.25 TABLET, FILM COATED ORAL at 20:47

## 2025-01-02 RX ADMIN — PROPRANOLOL HYDROCHLORIDE 20 MG: 40 TABLET ORAL at 09:35

## 2025-01-02 RX ADMIN — MEMANTINE 10 MG: 5 TABLET ORAL at 20:47

## 2025-01-02 NOTE — CARE COORDINATION
Chart reviewed day 4. Plan for admit to [facility].  Not in network with insurance. Family to pay privately. Unable to get financial docs to provide to facility due to holiday. Elisabeth Muñoz RN    Spoke with daughter Yesenia, stated she is looking to other facilities in network. Would like referral to [facility], [facility] and [facility]. Done. Waiting determinations. [facility] cannot accept due to covid. Elisabeth Muñoz RN    [facility] is in network will review. Elisabeth Muñoz RN    [facility] has no bed availability. Still no response from [facility]. Elisabeth Muñoz RN    Spoke with Yesenia would like referral to [facility] done. Waiting determination. Elisabeth Muñoz RN    Spoke with [facility], cannot accept. Elisabeth Muñoz RN     Spoke with Yesenia, emailed list of facilities with medicare ratings list. [email or web address]. requested return call for placement options. Elisabeth Muñoz RN

## 2025-01-03 ENCOUNTER — APPOINTMENT (OUTPATIENT)
Dept: CT IMAGING | Age: 89
DRG: 689 | End: 2025-01-03
Payer: MEDICARE

## 2025-01-03 LAB
ANION GAP SERPL CALCULATED.3IONS-SCNC: 11 MMOL/L (ref 3–16)
BUN SERPL-MCNC: 7 MG/DL (ref 7–20)
CALCIUM SERPL-MCNC: 8.7 MG/DL (ref 8.3–10.6)
CHLORIDE SERPL-SCNC: 100 MMOL/L (ref 99–110)
CO2 SERPL-SCNC: 24 MMOL/L (ref 21–32)
CREAT SERPL-MCNC: 0.6 MG/DL (ref 0.6–1.2)
DEPRECATED RDW RBC AUTO: 12.6 % (ref 12.4–15.4)
GFR SERPLBLD CREATININE-BSD FMLA CKD-EPI: 86 ML/MIN/{1.73_M2}
GLUCOSE SERPL-MCNC: 93 MG/DL (ref 70–99)
HCT VFR BLD AUTO: 38.8 % (ref 36–48)
HGB BLD-MCNC: 13.1 G/DL (ref 12–16)
MAGNESIUM SERPL-MCNC: 1.65 MG/DL (ref 1.8–2.4)
MCH RBC QN AUTO: 32.9 PG (ref 26–34)
MCHC RBC AUTO-ENTMCNC: 33.9 G/DL (ref 31–36)
MCV RBC AUTO: 97.3 FL (ref 80–100)
PLATELET # BLD AUTO: 208 K/UL (ref 135–450)
PMV BLD AUTO: 8.3 FL (ref 5–10.5)
POTASSIUM SERPL-SCNC: 3.5 MMOL/L (ref 3.5–5.1)
RBC # BLD AUTO: 3.99 M/UL (ref 4–5.2)
REASON FOR REJECTION: NORMAL
REJECTED TEST: NORMAL
SODIUM SERPL-SCNC: 135 MMOL/L (ref 136–145)
WBC # BLD AUTO: 4.3 K/UL (ref 4–11)

## 2025-01-03 PROCEDURE — 6370000000 HC RX 637 (ALT 250 FOR IP)

## 2025-01-03 PROCEDURE — 83735 ASSAY OF MAGNESIUM: CPT

## 2025-01-03 PROCEDURE — 6370000000 HC RX 637 (ALT 250 FOR IP): Performed by: NURSE PRACTITIONER

## 2025-01-03 PROCEDURE — 1200000000 HC SEMI PRIVATE

## 2025-01-03 PROCEDURE — 6360000002 HC RX W HCPCS: Performed by: NURSE PRACTITIONER

## 2025-01-03 PROCEDURE — 85027 COMPLETE CBC AUTOMATED: CPT

## 2025-01-03 PROCEDURE — 80048 BASIC METABOLIC PNL TOTAL CA: CPT

## 2025-01-03 PROCEDURE — 36415 COLL VENOUS BLD VENIPUNCTURE: CPT

## 2025-01-03 PROCEDURE — 70450 CT HEAD/BRAIN W/O DYE: CPT

## 2025-01-03 PROCEDURE — 94640 AIRWAY INHALATION TREATMENT: CPT

## 2025-01-03 RX ORDER — RISPERIDONE 0.25 MG/1
0.75 TABLET ORAL NIGHTLY
DISCHARGE
Start: 2025-01-03 | End: 2025-01-08 | Stop reason: HOSPADM

## 2025-01-03 RX ORDER — OLANZAPINE 10 MG/2ML
5 INJECTION, POWDER, FOR SOLUTION INTRAMUSCULAR ONCE
Status: DISCONTINUED | OUTPATIENT
Start: 2025-01-03 | End: 2025-01-09 | Stop reason: HOSPADM

## 2025-01-03 RX ADMIN — PROPRANOLOL HYDROCHLORIDE 20 MG: 40 TABLET ORAL at 13:44

## 2025-01-03 RX ADMIN — Medication 2 PUFF: at 08:06

## 2025-01-03 RX ADMIN — MEMANTINE 10 MG: 5 TABLET ORAL at 09:29

## 2025-01-03 RX ADMIN — Medication 2 PUFF: at 15:30

## 2025-01-03 RX ADMIN — TIOTROPIUM BROMIDE INHALATION SPRAY 2 PUFF: 3.12 SPRAY, METERED RESPIRATORY (INHALATION) at 08:06

## 2025-01-03 RX ADMIN — PRIMIDONE 50 MG: 50 TABLET ORAL at 09:29

## 2025-01-03 RX ADMIN — PROPRANOLOL HYDROCHLORIDE 20 MG: 40 TABLET ORAL at 09:29

## 2025-01-03 RX ADMIN — Medication 2 PUFF: at 11:55

## 2025-01-03 RX ADMIN — SERTRALINE HYDROCHLORIDE 50 MG: 50 TABLET ORAL at 09:30

## 2025-01-03 RX ADMIN — MEMANTINE 10 MG: 5 TABLET ORAL at 20:39

## 2025-01-03 RX ADMIN — ENOXAPARIN SODIUM 40 MG: 100 INJECTION SUBCUTANEOUS at 09:30

## 2025-01-03 RX ADMIN — PRIMIDONE 50 MG: 50 TABLET ORAL at 20:39

## 2025-01-03 RX ADMIN — PRIMIDONE 50 MG: 50 TABLET ORAL at 13:44

## 2025-01-03 RX ADMIN — LEVOTHYROXINE SODIUM 125 MCG: 0.12 TABLET ORAL at 09:29

## 2025-01-03 RX ADMIN — FLUTICASONE PROPIONATE 1 SPRAY: 50 SPRAY, METERED NASAL at 09:30

## 2025-01-03 RX ADMIN — ACETAMINOPHEN 650 MG: 325 TABLET, FILM COATED ORAL at 22:01

## 2025-01-03 RX ADMIN — PANTOPRAZOLE SODIUM 40 MG: 40 TABLET, DELAYED RELEASE ORAL at 09:29

## 2025-01-03 RX ADMIN — RISPERIDONE 0.75 MG: 0.25 TABLET, FILM COATED ORAL at 20:38

## 2025-01-03 NOTE — CARE COORDINATION
Docs were dropped off to [facility]. They have forwarded it to corporate for approval. Per Tanesha, avasys will need turned off for 24hrs. Spoke with Gabriella JOHNSON, will turn off. Provided Tanesha weekend CM # to notify of approval/denial private pay status. Yesenia notified of above. Elisabeth Muñoz RN

## 2025-01-03 NOTE — CARE COORDINATION
Received message from Yesenia. Stated she knows that [facility] has 3 beds and wants to know if they will accept when 10 days of isolation are done. They could managed family support at [facility] that long. Per Malgorzata at [facility], no they will not be able to accept from home setting with any amount of certainty. Even if has supportive therapy input insurance company usually declines when home. Also the 3 beds they have are already promised to other people.  She also would like to know if insurance will pay for private duty nursing for her around the clock. Informed private duty is private pay. Stated she did receive the SNF list I emailed her yesterday and none of the other places meet their standards. Yesenia said would consider taking her to [facility] with family coverage and [facility] with supportive. When I told her I would call she requested I not. Stated she would speak with her family more and get back to me. Elisabeth Muñoz RN    Spoke with daughter would like referrals to [facility], per V[facility] cannot accept till 1/8 10 days of isolation. Referral to [facility], reviewing but can accept covid +. [facility] referral sent. Elisabeth Muñoz RN    Spoke with Yesenia again. Referral to [facility]. [facility] cannot accept covid +. Elisabeth Muñoz RN    [facility] cannot accept they are full. Liaison said [facility] could review if they wanted to go that far. Elisabeth Muñoz RN    [facility] can accept. Elisabeth Muñoz RN    Spoke with daughter. Stated they researched and do not want to go to [place]. Are ready to provide documents to [facility]. Per [facility] can be emailed to [email or web address]. informed Yesenia. Yesenia stated she has all the documents but cannot email them. Would like to drop off at facility. Received ok from . They will drop off today. Will not give answer on acceptance till received. Elisabeth Muñoz RN

## 2025-01-03 NOTE — CARE COORDINATION
Spoke with Tanesha at [place], stated patient will not be able to return to assisted living with her physical status as it is at this time.

## 2025-01-03 NOTE — CARE COORDINATION
Spoke with Tanesha at [facility]. Patient was approved for [facility] private pay. Will be able to d/c tomorrow after 315  tomorrow if tolerates off Avasys. Elisabeth Muñoz RN

## 2025-01-04 LAB
ANION GAP SERPL CALCULATED.3IONS-SCNC: 11 MMOL/L (ref 3–16)
BUN SERPL-MCNC: 8 MG/DL (ref 7–20)
CALCIUM SERPL-MCNC: 8.6 MG/DL (ref 8.3–10.6)
CHLORIDE SERPL-SCNC: 96 MMOL/L (ref 99–110)
CO2 SERPL-SCNC: 26 MMOL/L (ref 21–32)
CREAT SERPL-MCNC: 0.7 MG/DL (ref 0.6–1.2)
DEPRECATED RDW RBC AUTO: 13.4 % (ref 12.4–15.4)
GFR SERPLBLD CREATININE-BSD FMLA CKD-EPI: 83 ML/MIN/{1.73_M2}
GLUCOSE SERPL-MCNC: 105 MG/DL (ref 70–99)
HCT VFR BLD AUTO: 39.5 % (ref 36–48)
HGB BLD-MCNC: 13.3 G/DL (ref 12–16)
MAGNESIUM SERPL-MCNC: 1.56 MG/DL (ref 1.8–2.4)
MCH RBC QN AUTO: 33.6 PG (ref 26–34)
MCHC RBC AUTO-ENTMCNC: 33.8 G/DL (ref 31–36)
MCV RBC AUTO: 99.5 FL (ref 80–100)
PLATELET # BLD AUTO: 196 K/UL (ref 135–450)
PMV BLD AUTO: 7.8 FL (ref 5–10.5)
POTASSIUM SERPL-SCNC: 3.5 MMOL/L (ref 3.5–5.1)
RBC # BLD AUTO: 3.97 M/UL (ref 4–5.2)
SODIUM SERPL-SCNC: 133 MMOL/L (ref 136–145)
WBC # BLD AUTO: 4.3 K/UL (ref 4–11)

## 2025-01-04 PROCEDURE — 2500000003 HC RX 250 WO HCPCS: Performed by: NURSE PRACTITIONER

## 2025-01-04 PROCEDURE — 6370000000 HC RX 637 (ALT 250 FOR IP)

## 2025-01-04 PROCEDURE — 36415 COLL VENOUS BLD VENIPUNCTURE: CPT

## 2025-01-04 PROCEDURE — 85027 COMPLETE CBC AUTOMATED: CPT

## 2025-01-04 PROCEDURE — 6370000000 HC RX 637 (ALT 250 FOR IP): Performed by: NURSE PRACTITIONER

## 2025-01-04 PROCEDURE — 6370000000 HC RX 637 (ALT 250 FOR IP): Performed by: PSYCHIATRY & NEUROLOGY

## 2025-01-04 PROCEDURE — 80048 BASIC METABOLIC PNL TOTAL CA: CPT

## 2025-01-04 PROCEDURE — 94640 AIRWAY INHALATION TREATMENT: CPT

## 2025-01-04 PROCEDURE — 6360000002 HC RX W HCPCS: Performed by: NURSE PRACTITIONER

## 2025-01-04 PROCEDURE — 83735 ASSAY OF MAGNESIUM: CPT

## 2025-01-04 PROCEDURE — 1200000000 HC SEMI PRIVATE

## 2025-01-04 PROCEDURE — 6360000002 HC RX W HCPCS

## 2025-01-04 RX ORDER — QUETIAPINE FUMARATE 25 MG/1
12.5 TABLET, FILM COATED ORAL 3 TIMES DAILY
Status: DISCONTINUED | OUTPATIENT
Start: 2025-01-04 | End: 2025-01-08

## 2025-01-04 RX ORDER — LORAZEPAM 0.5 MG/1
0.5 TABLET ORAL EVERY 6 HOURS PRN
Status: DISCONTINUED | OUTPATIENT
Start: 2025-01-04 | End: 2025-01-06

## 2025-01-04 RX ORDER — MAGNESIUM SULFATE IN WATER 40 MG/ML
2000 INJECTION, SOLUTION INTRAVENOUS ONCE
Status: COMPLETED | OUTPATIENT
Start: 2025-01-04 | End: 2025-01-04

## 2025-01-04 RX ORDER — ALBUTEROL SULFATE 90 UG/1
2 INHALANT RESPIRATORY (INHALATION) EVERY 4 HOURS PRN
Status: DISCONTINUED | OUTPATIENT
Start: 2025-01-04 | End: 2025-01-09 | Stop reason: HOSPADM

## 2025-01-04 RX ADMIN — FLUTICASONE PROPIONATE 1 SPRAY: 50 SPRAY, METERED NASAL at 10:41

## 2025-01-04 RX ADMIN — LEVOTHYROXINE SODIUM 125 MCG: 0.12 TABLET ORAL at 07:35

## 2025-01-04 RX ADMIN — PRIMIDONE 50 MG: 50 TABLET ORAL at 10:41

## 2025-01-04 RX ADMIN — ENOXAPARIN SODIUM 40 MG: 100 INJECTION SUBCUTANEOUS at 10:41

## 2025-01-04 RX ADMIN — ACETAMINOPHEN 650 MG: 325 TABLET, FILM COATED ORAL at 15:01

## 2025-01-04 RX ADMIN — ACETAMINOPHEN 650 MG: 325 TABLET, FILM COATED ORAL at 07:35

## 2025-01-04 RX ADMIN — TIOTROPIUM BROMIDE INHALATION SPRAY 2 PUFF: 3.12 SPRAY, METERED RESPIRATORY (INHALATION) at 07:57

## 2025-01-04 RX ADMIN — PANTOPRAZOLE SODIUM 40 MG: 40 TABLET, DELAYED RELEASE ORAL at 07:35

## 2025-01-04 RX ADMIN — RISPERIDONE 0.75 MG: 0.25 TABLET, FILM COATED ORAL at 21:18

## 2025-01-04 RX ADMIN — MAGNESIUM SULFATE HEPTAHYDRATE 2000 MG: 40 INJECTION, SOLUTION INTRAVENOUS at 10:48

## 2025-01-04 RX ADMIN — PROPRANOLOL HYDROCHLORIDE 20 MG: 40 TABLET ORAL at 21:18

## 2025-01-04 RX ADMIN — PROPRANOLOL HYDROCHLORIDE 20 MG: 40 TABLET ORAL at 10:40

## 2025-01-04 RX ADMIN — QUETIAPINE FUMARATE 12.5 MG: 25 TABLET ORAL at 21:18

## 2025-01-04 RX ADMIN — PRIMIDONE 50 MG: 50 TABLET ORAL at 14:57

## 2025-01-04 RX ADMIN — Medication 2 PUFF: at 07:57

## 2025-01-04 RX ADMIN — SODIUM CHLORIDE, PRESERVATIVE FREE 10 ML: 5 INJECTION INTRAVENOUS at 10:48

## 2025-01-04 RX ADMIN — PROPRANOLOL HYDROCHLORIDE 20 MG: 40 TABLET ORAL at 12:30

## 2025-01-04 RX ADMIN — SERTRALINE HYDROCHLORIDE 50 MG: 50 TABLET ORAL at 10:40

## 2025-01-04 RX ADMIN — PRIMIDONE 50 MG: 50 TABLET ORAL at 21:18

## 2025-01-04 RX ADMIN — QUETIAPINE FUMARATE 12.5 MG: 25 TABLET ORAL at 17:23

## 2025-01-04 RX ADMIN — SODIUM CHLORIDE, PRESERVATIVE FREE 10 ML: 5 INJECTION INTRAVENOUS at 21:54

## 2025-01-04 RX ADMIN — MEMANTINE 10 MG: 5 TABLET ORAL at 21:17

## 2025-01-04 RX ADMIN — PROPRANOLOL HYDROCHLORIDE 20 MG: 40 TABLET ORAL at 17:23

## 2025-01-04 RX ADMIN — MEMANTINE 10 MG: 5 TABLET ORAL at 10:40

## 2025-01-04 ASSESSMENT — PAIN DESCRIPTION - LOCATION: LOCATION: FOOT

## 2025-01-04 ASSESSMENT — PAIN SCALES - GENERAL: PAINLEVEL_OUTOF10: 3

## 2025-01-04 NOTE — CARE COORDINATION
RAINER spoke with Tanesha from [place] in regards to avasys. Avasys was put back in use due to fall and needing added safety. Tanesha states she has to be 24 hours avasys free for them to accept. She also stated she would ask her team if there are any stipulations since we are using this as fall prevention.

## 2025-01-04 NOTE — CONSULTS
88 yo female, hx dementia, asked to see due to fairly constant anxiety, yelling out for help, with prominent increase in tremors, poor sleep, and poor safety recognition.  Occurs all day with some pm exacerbation.  Anxiety sx started about 2-3 wks ago, prior to dx of covid.      Poor historian. Dtr states was started on risperidone, zoloft about 1.5 years ago, for anger/cursing/aggression, and had good response at .25 to .5mg, got worse off it, so restarted with success.  Unclear if zoloft ever helped but never tried off it since.      Risperidone dose increased slightly here, to .75mg.  Also received one time zyprexa yesterday, but nurse does not believe it helped.      Hx familial tremor, on primidone/beta blocker for many years    Medical:  Covid infection, UTI.  But has medically stabilized and felt to be ready for SNF rehab.  However, too restless to be left unsupervised, due to major fall risk, and in fact did fall in past 24hrs    Social:  lives at [place].  Goal is to return there after SNF stay at [place].  Dtrs involved.      MSE:  initially anxious, but fell asleep while I was gathering info from dtr.  Speech clear.  Tremulous at times.  O x self only.  Poor insight, judgment.  No halluc.  No current h/s id.      Impression:  dementia with overlying delirium.      Recommend:   Will add routine seroquel, as it can help all target symptoms.   Further increases in risperidone can cause akathisia, and could thus worsen restlessness.  Also will provide prn ativan, but watch for paradoxical agitation from it.  Dtr in agreement  Look for environmental measures that may help calm, such as music, aromatherapy, reduction of external stimulation, etc.

## 2025-01-04 NOTE — RT PROTOCOL NOTE
RT Inhaler-Nebulizer Bronchodilator Protocol Note    There is a bronchodilator order in the chart from a provider indicating to follow the RT Bronchodilator Protocol and there is an “Initiate RT Inhaler-Nebulizer Bronchodilator Protocol” order as well (see protocol at bottom of note).    CXR Findings:  XR CHEST PORTABLE    Result Date: 12/29/2024  No acute pulmonary findings. Similar appearing reticular markings extending to the periphery which may represent underlying interstitial lung disease.       The findings from the last RT Protocol Assessment were as follows:   History Pulmonary Disease: None or smoker <15 pack years  Respiratory Pattern: Dyspnea on exertion or RR 21-25 bpm  Breath Sounds: Slightly diminished and/or crackles  Cough: Strong, spontaneous, non-productive  Indication for Bronchodilator Therapy: Decreased or absent breath sounds  Bronchodilator Assessment Score: 4    Aerosolized bronchodilator medication orders have been revised according to the RT Inhaler-Nebulizer Bronchodilator Protocol below.    Respiratory Therapist to perform RT Therapy Protocol Assessment initially then follow the protocol.  Repeat RT Therapy Protocol Assessment PRN for score 0-3 or on second treatment, BID, and PRN for scores above 3.    No Indications - adjust the frequency to every 6 hours PRN wheezing or bronchospasm, if no treatments needed after 48 hours then discontinue using Per Protocol order mode.     If indication present, adjust the RT bronchodilator orders based on the Bronchodilator Assessment Score as indicated below.  Use Inhaler orders unless patient has one or more of the following: on home nebulizer, not able to hold breath for 10 seconds, is not alert and oriented, cannot activate and use MDI correctly, or respiratory rate 25 breaths per minute or more, then use the equivalent nebulizer order(s) with same Frequency and PRN reasons based on the score.  If a patient is on this medication at home then do 
RT Inhaler-Nebulizer Bronchodilator Protocol Note    There is a bronchodilator order in the chart from a provider indicating to follow the RT Bronchodilator Protocol and there is an “Initiate RT Inhaler-Nebulizer Bronchodilator Protocol” order as well (see protocol at bottom of note).    CXR Findings:  XR CHEST PORTABLE    Result Date: 12/29/2024  No acute pulmonary findings. Similar appearing reticular markings extending to the periphery which may represent underlying interstitial lung disease.       The findings from the last RT Protocol Assessment were as follows:   History Pulmonary Disease: None or smoker <15 pack years  Respiratory Pattern: Regular pattern and RR 12-20 bpm  Breath Sounds: Slightly diminished and/or crackles  Cough: Strong, spontaneous, non-productive  Indication for Bronchodilator Therapy: Decreased or absent breath sounds  Bronchodilator Assessment Score: 2    Aerosolized bronchodilator medication orders have been revised according to the RT Inhaler-Nebulizer Bronchodilator Protocol below.    Respiratory Therapist to perform RT Therapy Protocol Assessment initially then follow the protocol.  Repeat RT Therapy Protocol Assessment PRN for score 0-3 or on second treatment, BID, and PRN for scores above 3.    No Indications - adjust the frequency to every 6 hours PRN wheezing or bronchospasm, if no treatments needed after 48 hours then discontinue using Per Protocol order mode.     If indication present, adjust the RT bronchodilator orders based on the Bronchodilator Assessment Score as indicated below.  Use Inhaler orders unless patient has one or more of the following: on home nebulizer, not able to hold breath for 10 seconds, is not alert and oriented, cannot activate and use MDI correctly, or respiratory rate 25 breaths per minute or more, then use the equivalent nebulizer order(s) with same Frequency and PRN reasons based on the score.  If a patient is on this medication at home then do not 
breathing using Per Protocol order mode.        4-6 - enter or revise RT Bronchodilator order(s) to two equivalent RT bronchodilator orders with one order with BID Frequency and one order with Frequency of every 4 hours PRN wheezing or increased work of breathing using Per Protocol order mode.        7-10 - enter or revise RT Bronchodilator order(s) to two equivalent RT bronchodilator orders with one order with TID Frequency and one order with Frequency of every 4 hours PRN wheezing or increased work of breathing using Per Protocol order mode.       11-13 - enter or revise RT Bronchodilator order(s) to one equivalent RT bronchodilator order with QID Frequency and an Albuterol order with Frequency of every 4 hours PRN wheezing or increased work of breathing using Per Protocol order mode.      Greater than 13 - enter or revise RT Bronchodilator order(s) to one equivalent RT bronchodilator order with every 4 hours Frequency and an Albuterol order with Frequency of every 2 hours PRN wheezing or increased work of breathing using Per Protocol order mode.     RT to enter RT Home Evaluation for COPD & MDI Assessment order using Per Protocol order mode.    Electronically signed by Claritza Morrison RCP on 1/4/2025 at 11:28 AM

## 2025-01-05 PROCEDURE — 1200000000 HC SEMI PRIVATE

## 2025-01-05 PROCEDURE — 2500000003 HC RX 250 WO HCPCS: Performed by: NURSE PRACTITIONER

## 2025-01-05 PROCEDURE — 6370000000 HC RX 637 (ALT 250 FOR IP): Performed by: NURSE PRACTITIONER

## 2025-01-05 PROCEDURE — 6370000000 HC RX 637 (ALT 250 FOR IP)

## 2025-01-05 PROCEDURE — 6360000002 HC RX W HCPCS: Performed by: NURSE PRACTITIONER

## 2025-01-05 PROCEDURE — 6370000000 HC RX 637 (ALT 250 FOR IP): Performed by: PSYCHIATRY & NEUROLOGY

## 2025-01-05 PROCEDURE — 6360000002 HC RX W HCPCS

## 2025-01-05 RX ORDER — LORAZEPAM 2 MG/ML
1 INJECTION INTRAMUSCULAR EVERY 4 HOURS PRN
Status: DISCONTINUED | OUTPATIENT
Start: 2025-01-05 | End: 2025-01-06

## 2025-01-05 RX ORDER — MORPHINE SULFATE 2 MG/ML
1 INJECTION, SOLUTION INTRAMUSCULAR; INTRAVENOUS EVERY 4 HOURS PRN
Status: DISCONTINUED | OUTPATIENT
Start: 2025-01-05 | End: 2025-01-09 | Stop reason: HOSPADM

## 2025-01-05 RX ORDER — MORPHINE SULFATE 2 MG/ML
2 INJECTION, SOLUTION INTRAMUSCULAR; INTRAVENOUS EVERY 4 HOURS PRN
Status: DISCONTINUED | OUTPATIENT
Start: 2025-01-05 | End: 2025-01-09 | Stop reason: HOSPADM

## 2025-01-05 RX ADMIN — PRIMIDONE 50 MG: 50 TABLET ORAL at 11:45

## 2025-01-05 RX ADMIN — PRIMIDONE 50 MG: 50 TABLET ORAL at 21:04

## 2025-01-05 RX ADMIN — MEMANTINE 10 MG: 5 TABLET ORAL at 21:05

## 2025-01-05 RX ADMIN — MEMANTINE 10 MG: 5 TABLET ORAL at 11:45

## 2025-01-05 RX ADMIN — SERTRALINE HYDROCHLORIDE 50 MG: 50 TABLET ORAL at 11:45

## 2025-01-05 RX ADMIN — FLUTICASONE PROPIONATE 1 SPRAY: 50 SPRAY, METERED NASAL at 11:47

## 2025-01-05 RX ADMIN — LORAZEPAM 0.5 MG: 0.5 TABLET ORAL at 01:22

## 2025-01-05 RX ADMIN — QUETIAPINE FUMARATE 12.5 MG: 25 TABLET ORAL at 15:21

## 2025-01-05 RX ADMIN — PANTOPRAZOLE SODIUM 40 MG: 40 TABLET, DELAYED RELEASE ORAL at 06:27

## 2025-01-05 RX ADMIN — LORAZEPAM 1 MG: 2 INJECTION INTRAMUSCULAR; INTRAVENOUS at 16:33

## 2025-01-05 RX ADMIN — RISPERIDONE 0.75 MG: 0.25 TABLET, FILM COATED ORAL at 21:05

## 2025-01-05 RX ADMIN — SODIUM CHLORIDE, PRESERVATIVE FREE 10 ML: 5 INJECTION INTRAVENOUS at 21:07

## 2025-01-05 RX ADMIN — PRIMIDONE 50 MG: 50 TABLET ORAL at 15:21

## 2025-01-05 RX ADMIN — QUETIAPINE FUMARATE 12.5 MG: 25 TABLET ORAL at 21:05

## 2025-01-05 RX ADMIN — ACETAMINOPHEN 650 MG: 325 TABLET, FILM COATED ORAL at 01:21

## 2025-01-05 RX ADMIN — LORAZEPAM 1 MG: 2 INJECTION INTRAMUSCULAR; INTRAVENOUS at 12:01

## 2025-01-05 RX ADMIN — QUETIAPINE FUMARATE 12.5 MG: 25 TABLET ORAL at 11:45

## 2025-01-05 RX ADMIN — LEVOTHYROXINE SODIUM 125 MCG: 0.12 TABLET ORAL at 06:27

## 2025-01-05 RX ADMIN — SODIUM CHLORIDE, PRESERVATIVE FREE 10 ML: 5 INJECTION INTRAVENOUS at 11:46

## 2025-01-05 ASSESSMENT — PAIN DESCRIPTION - LOCATION: LOCATION: BACK

## 2025-01-05 ASSESSMENT — PAIN SCALES - GENERAL: PAINLEVEL_OUTOF10: 3

## 2025-01-06 PROCEDURE — 6370000000 HC RX 637 (ALT 250 FOR IP)

## 2025-01-06 PROCEDURE — 6360000002 HC RX W HCPCS

## 2025-01-06 PROCEDURE — 6370000000 HC RX 637 (ALT 250 FOR IP): Performed by: INTERNAL MEDICINE

## 2025-01-06 PROCEDURE — 6370000000 HC RX 637 (ALT 250 FOR IP): Performed by: PSYCHIATRY & NEUROLOGY

## 2025-01-06 PROCEDURE — 2500000003 HC RX 250 WO HCPCS: Performed by: NURSE PRACTITIONER

## 2025-01-06 PROCEDURE — 6370000000 HC RX 637 (ALT 250 FOR IP): Performed by: NURSE PRACTITIONER

## 2025-01-06 PROCEDURE — 1200000000 HC SEMI PRIVATE

## 2025-01-06 RX ORDER — MAGNESIUM SULFATE IN WATER 40 MG/ML
2000 INJECTION, SOLUTION INTRAVENOUS ONCE
Status: COMPLETED | OUTPATIENT
Start: 2025-01-06 | End: 2025-01-06

## 2025-01-06 RX ORDER — LORAZEPAM 1 MG/1
1 TABLET ORAL EVERY 8 HOURS PRN
Status: DISCONTINUED | OUTPATIENT
Start: 2025-01-06 | End: 2025-01-08

## 2025-01-06 RX ADMIN — PROPRANOLOL HYDROCHLORIDE 20 MG: 40 TABLET ORAL at 17:50

## 2025-01-06 RX ADMIN — MEMANTINE 10 MG: 5 TABLET ORAL at 21:09

## 2025-01-06 RX ADMIN — QUETIAPINE FUMARATE 12.5 MG: 25 TABLET ORAL at 13:45

## 2025-01-06 RX ADMIN — PRIMIDONE 50 MG: 50 TABLET ORAL at 10:39

## 2025-01-06 RX ADMIN — FLUTICASONE PROPIONATE 1 SPRAY: 50 SPRAY, METERED NASAL at 10:39

## 2025-01-06 RX ADMIN — LEVOTHYROXINE SODIUM 125 MCG: 0.12 TABLET ORAL at 06:28

## 2025-01-06 RX ADMIN — PRIMIDONE 50 MG: 50 TABLET ORAL at 21:09

## 2025-01-06 RX ADMIN — PROPRANOLOL HYDROCHLORIDE 20 MG: 40 TABLET ORAL at 10:39

## 2025-01-06 RX ADMIN — PRIMIDONE 50 MG: 50 TABLET ORAL at 16:22

## 2025-01-06 RX ADMIN — RISPERIDONE 0.75 MG: 0.25 TABLET, FILM COATED ORAL at 21:09

## 2025-01-06 RX ADMIN — PROPRANOLOL HYDROCHLORIDE 20 MG: 40 TABLET ORAL at 21:06

## 2025-01-06 RX ADMIN — MAGNESIUM SULFATE IN WATER 2000 MG: 40 INJECTION, SOLUTION INTRAVENOUS at 10:38

## 2025-01-06 RX ADMIN — MEMANTINE 10 MG: 5 TABLET ORAL at 10:39

## 2025-01-06 RX ADMIN — LORAZEPAM 1 MG: 1 TABLET ORAL at 21:30

## 2025-01-06 RX ADMIN — SERTRALINE HYDROCHLORIDE 50 MG: 50 TABLET ORAL at 10:39

## 2025-01-06 RX ADMIN — LORAZEPAM 0.5 MG: 0.5 TABLET ORAL at 02:36

## 2025-01-06 RX ADMIN — SODIUM CHLORIDE, PRESERVATIVE FREE 10 ML: 5 INJECTION INTRAVENOUS at 21:09

## 2025-01-06 RX ADMIN — PROPRANOLOL HYDROCHLORIDE 20 MG: 40 TABLET ORAL at 13:45

## 2025-01-06 RX ADMIN — SODIUM CHLORIDE, PRESERVATIVE FREE 10 ML: 5 INJECTION INTRAVENOUS at 10:40

## 2025-01-06 RX ADMIN — PANTOPRAZOLE SODIUM 40 MG: 40 TABLET, DELAYED RELEASE ORAL at 06:28

## 2025-01-06 RX ADMIN — QUETIAPINE FUMARATE 12.5 MG: 25 TABLET ORAL at 21:09

## 2025-01-06 ASSESSMENT — PAIN SCALES - GENERAL: PAINLEVEL_OUTOF10: 0

## 2025-01-06 NOTE — CARE COORDINATION
Chart reviewed day 8. Care managed by IM. Plan for return to [place] with [facility]OC services. Patient is unable to return to memory care there till has 10 days from diagnosis of Covid. She will be in a shared room. They will be able to accept on Thurs, the 9th. Per [facility] will have equipment delivered on the 8th. Spoke with patients daughter Yesenia. Elisabeth Muñoz RN     Treatment Plan not completed within required time limits due to: no show appointment   on 11/8/2018

## 2025-01-07 PROCEDURE — 6370000000 HC RX 637 (ALT 250 FOR IP)

## 2025-01-07 PROCEDURE — 94640 AIRWAY INHALATION TREATMENT: CPT

## 2025-01-07 PROCEDURE — 6370000000 HC RX 637 (ALT 250 FOR IP): Performed by: PSYCHIATRY & NEUROLOGY

## 2025-01-07 PROCEDURE — 6370000000 HC RX 637 (ALT 250 FOR IP): Performed by: INTERNAL MEDICINE

## 2025-01-07 PROCEDURE — 6370000000 HC RX 637 (ALT 250 FOR IP): Performed by: NURSE PRACTITIONER

## 2025-01-07 PROCEDURE — 51798 US URINE CAPACITY MEASURE: CPT

## 2025-01-07 PROCEDURE — 1200000000 HC SEMI PRIVATE

## 2025-01-07 RX ADMIN — SERTRALINE HYDROCHLORIDE 50 MG: 50 TABLET ORAL at 09:13

## 2025-01-07 RX ADMIN — LORAZEPAM 1 MG: 1 TABLET ORAL at 13:48

## 2025-01-07 RX ADMIN — PROPRANOLOL HYDROCHLORIDE 20 MG: 40 TABLET ORAL at 09:13

## 2025-01-07 RX ADMIN — QUETIAPINE FUMARATE 12.5 MG: 25 TABLET ORAL at 13:47

## 2025-01-07 RX ADMIN — LEVOTHYROXINE SODIUM 125 MCG: 0.12 TABLET ORAL at 05:46

## 2025-01-07 RX ADMIN — PROPRANOLOL HYDROCHLORIDE 20 MG: 40 TABLET ORAL at 13:58

## 2025-01-07 RX ADMIN — PROPRANOLOL HYDROCHLORIDE 20 MG: 40 TABLET ORAL at 18:08

## 2025-01-07 RX ADMIN — PROPRANOLOL HYDROCHLORIDE 20 MG: 40 TABLET ORAL at 21:19

## 2025-01-07 RX ADMIN — FLUTICASONE PROPIONATE 1 SPRAY: 50 SPRAY, METERED NASAL at 09:14

## 2025-01-07 RX ADMIN — TIOTROPIUM BROMIDE INHALATION SPRAY 2 PUFF: 3.12 SPRAY, METERED RESPIRATORY (INHALATION) at 08:15

## 2025-01-07 RX ADMIN — PRIMIDONE 50 MG: 50 TABLET ORAL at 13:48

## 2025-01-07 RX ADMIN — PRIMIDONE 50 MG: 50 TABLET ORAL at 21:24

## 2025-01-07 RX ADMIN — RISPERIDONE 0.75 MG: 0.25 TABLET, FILM COATED ORAL at 21:19

## 2025-01-07 RX ADMIN — LORAZEPAM 1 MG: 1 TABLET ORAL at 21:19

## 2025-01-07 RX ADMIN — LORAZEPAM 1 MG: 1 TABLET ORAL at 05:46

## 2025-01-07 RX ADMIN — MEMANTINE 10 MG: 5 TABLET ORAL at 21:19

## 2025-01-07 RX ADMIN — MEMANTINE 10 MG: 5 TABLET ORAL at 09:13

## 2025-01-07 RX ADMIN — QUETIAPINE FUMARATE 12.5 MG: 25 TABLET ORAL at 09:14

## 2025-01-07 RX ADMIN — PANTOPRAZOLE SODIUM 40 MG: 40 TABLET, DELAYED RELEASE ORAL at 05:46

## 2025-01-07 RX ADMIN — QUETIAPINE FUMARATE 12.5 MG: 25 TABLET ORAL at 21:19

## 2025-01-07 RX ADMIN — PRIMIDONE 50 MG: 50 TABLET ORAL at 09:14

## 2025-01-07 ASSESSMENT — PAIN SCALES - GENERAL: PAINLEVEL_OUTOF10: 0

## 2025-01-07 NOTE — CARE COORDINATION
Chart reviewed day 9. Care managed by IM. Plan for return to [place]. Will now be in memory care unit with [facility]. Cannot accept back till out of isolation as will be in shared room. Will d/c on the 9th. DME to be delivered on 8th. Elisbaeth Muñoz RN

## 2025-01-07 NOTE — CARE COORDINATION
Spoke with Sherita JUAREZ. Stated equipment has been arranged fro delivery tomorrow. Transport arranged thru [facility] at 2pm on Thursday Jan 9. DNR on chart for signatures. Requested comfort meds see her note. Elisabeth Muñoz RN

## 2025-01-08 LAB
AMORPH SED URNS QL MICRO: ABNORMAL /HPF
BACTERIA URNS QL MICRO: ABNORMAL /HPF
BILIRUB UR QL STRIP.AUTO: ABNORMAL
CLARITY UR: CLEAR
COARSE GRAN CASTS #/AREA URNS LPF: ABNORMAL /LPF (ref 0–2)
COLOR UR: YELLOW
EPI CELLS #/AREA URNS HPF: ABNORMAL /HPF (ref 0–5)
FINE GRAN CASTS #/AREA URNS HPF: ABNORMAL /LPF (ref 0–2)
GLUCOSE UR STRIP.AUTO-MCNC: NEGATIVE MG/DL
HGB UR QL STRIP.AUTO: NEGATIVE
KETONES UR STRIP.AUTO-MCNC: NEGATIVE MG/DL
LEUKOCYTE ESTERASE UR QL STRIP.AUTO: NEGATIVE
MUCOUS THREADS #/AREA URNS LPF: ABNORMAL /LPF
NITRITE UR QL STRIP.AUTO: NEGATIVE
PH UR STRIP.AUTO: 7 [PH] (ref 5–8)
PROT UR STRIP.AUTO-MCNC: ABNORMAL MG/DL
RBC #/AREA URNS HPF: ABNORMAL /HPF (ref 0–4)
SP GR UR STRIP.AUTO: 1.02 (ref 1–1.03)
UA COMPLETE W REFLEX CULTURE PNL UR: ABNORMAL
UA DIPSTICK W REFLEX MICRO PNL UR: YES
URN SPEC COLLECT METH UR: ABNORMAL
UROBILINOGEN UR STRIP-ACNC: 0.2 E.U./DL
WBC #/AREA URNS HPF: ABNORMAL /HPF (ref 0–5)

## 2025-01-08 PROCEDURE — 6370000000 HC RX 637 (ALT 250 FOR IP): Performed by: PSYCHIATRY & NEUROLOGY

## 2025-01-08 PROCEDURE — 6370000000 HC RX 637 (ALT 250 FOR IP): Performed by: INTERNAL MEDICINE

## 2025-01-08 PROCEDURE — 6370000000 HC RX 637 (ALT 250 FOR IP)

## 2025-01-08 PROCEDURE — 6370000000 HC RX 637 (ALT 250 FOR IP): Performed by: NURSE PRACTITIONER

## 2025-01-08 PROCEDURE — 81001 URINALYSIS AUTO W/SCOPE: CPT

## 2025-01-08 PROCEDURE — 1200000000 HC SEMI PRIVATE

## 2025-01-08 PROCEDURE — 94640 AIRWAY INHALATION TREATMENT: CPT

## 2025-01-08 RX ORDER — DICYCLOMINE HYDROCHLORIDE 10 MG/1
10 CAPSULE ORAL
Status: DISCONTINUED | OUTPATIENT
Start: 2025-01-08 | End: 2025-01-09 | Stop reason: HOSPADM

## 2025-01-08 RX ORDER — DICYCLOMINE HYDROCHLORIDE 10 MG/1
10 CAPSULE ORAL
DISCHARGE
Start: 2025-01-08

## 2025-01-08 RX ORDER — MORPHINE SULFATE 2 MG/ML
2 INJECTION, SOLUTION INTRAMUSCULAR; INTRAVENOUS EVERY 4 HOURS PRN
Status: CANCELLED | OUTPATIENT
Start: 2025-01-08

## 2025-01-08 RX ORDER — MORPHINE SULFATE 2 MG/ML
1 INJECTION, SOLUTION INTRAMUSCULAR; INTRAVENOUS EVERY 4 HOURS PRN
Status: CANCELLED | OUTPATIENT
Start: 2025-01-08

## 2025-01-08 RX ORDER — QUETIAPINE FUMARATE 25 MG/1
12.5 TABLET, FILM COATED ORAL 2 TIMES DAILY
Status: DISCONTINUED | OUTPATIENT
Start: 2025-01-09 | End: 2025-01-09 | Stop reason: HOSPADM

## 2025-01-08 RX ORDER — LORAZEPAM 0.5 MG/1
0.5 TABLET ORAL EVERY 4 HOURS PRN
Status: DISCONTINUED | OUTPATIENT
Start: 2025-01-08 | End: 2025-01-09 | Stop reason: HOSPADM

## 2025-01-08 RX ORDER — ALBUTEROL SULFATE 90 UG/1
2 INHALANT RESPIRATORY (INHALATION) EVERY 4 HOURS PRN
DISCHARGE
Start: 2025-01-08

## 2025-01-08 RX ORDER — QUETIAPINE FUMARATE 25 MG/1
12.5 TABLET, FILM COATED ORAL 2 TIMES DAILY
DISCHARGE
Start: 2025-01-09

## 2025-01-08 RX ORDER — QUETIAPINE FUMARATE 25 MG/1
25 TABLET, FILM COATED ORAL ONCE
Status: COMPLETED | OUTPATIENT
Start: 2025-01-08 | End: 2025-01-08

## 2025-01-08 RX ORDER — LORAZEPAM 0.5 MG/1
0.5 TABLET ORAL EVERY 8 HOURS PRN
Qty: 9 TABLET | Refills: 0 | Status: SHIPPED | OUTPATIENT
Start: 2025-01-08 | End: 2025-01-09

## 2025-01-08 RX ORDER — RISPERIDONE 1 MG/1
1 TABLET ORAL NIGHTLY
DISCHARGE
Start: 2025-01-08

## 2025-01-08 RX ORDER — RISPERIDONE 1 MG/1
1 TABLET ORAL NIGHTLY
Status: DISCONTINUED | OUTPATIENT
Start: 2025-01-08 | End: 2025-01-09 | Stop reason: HOSPADM

## 2025-01-08 RX ADMIN — PROPRANOLOL HYDROCHLORIDE 20 MG: 40 TABLET ORAL at 08:17

## 2025-01-08 RX ADMIN — DICYCLOMINE HYDROCHLORIDE 10 MG: 10 CAPSULE ORAL at 12:58

## 2025-01-08 RX ADMIN — LORAZEPAM 0.5 MG: 0.5 TABLET ORAL at 23:10

## 2025-01-08 RX ADMIN — PROPRANOLOL HYDROCHLORIDE 20 MG: 40 TABLET ORAL at 12:58

## 2025-01-08 RX ADMIN — TIOTROPIUM BROMIDE INHALATION SPRAY 2 PUFF: 3.12 SPRAY, METERED RESPIRATORY (INHALATION) at 08:32

## 2025-01-08 RX ADMIN — SERTRALINE HYDROCHLORIDE 50 MG: 50 TABLET ORAL at 08:16

## 2025-01-08 RX ADMIN — PROPRANOLOL HYDROCHLORIDE 20 MG: 40 TABLET ORAL at 21:10

## 2025-01-08 RX ADMIN — LORAZEPAM 1 MG: 1 TABLET ORAL at 05:41

## 2025-01-08 RX ADMIN — PANTOPRAZOLE SODIUM 40 MG: 40 TABLET, DELAYED RELEASE ORAL at 05:41

## 2025-01-08 RX ADMIN — PRIMIDONE 50 MG: 50 TABLET ORAL at 08:16

## 2025-01-08 RX ADMIN — FLUTICASONE PROPIONATE 1 SPRAY: 50 SPRAY, METERED NASAL at 08:19

## 2025-01-08 RX ADMIN — QUETIAPINE FUMARATE 12.5 MG: 25 TABLET ORAL at 08:16

## 2025-01-08 RX ADMIN — QUETIAPINE FUMARATE 25 MG: 25 TABLET ORAL at 15:48

## 2025-01-08 RX ADMIN — LEVOTHYROXINE SODIUM 125 MCG: 0.12 TABLET ORAL at 05:41

## 2025-01-08 RX ADMIN — LORAZEPAM 0.5 MG: 0.5 TABLET ORAL at 19:04

## 2025-01-08 RX ADMIN — DICYCLOMINE HYDROCHLORIDE 10 MG: 10 CAPSULE ORAL at 21:10

## 2025-01-08 RX ADMIN — MEMANTINE 10 MG: 5 TABLET ORAL at 08:16

## 2025-01-08 RX ADMIN — MEMANTINE 10 MG: 5 TABLET ORAL at 21:10

## 2025-01-08 RX ADMIN — PRIMIDONE 50 MG: 50 TABLET ORAL at 15:48

## 2025-01-08 RX ADMIN — PRIMIDONE 50 MG: 50 TABLET ORAL at 21:10

## 2025-01-08 RX ADMIN — RISPERIDONE 1 MG: 1 TABLET ORAL at 21:10

## 2025-01-08 NOTE — CARE COORDINATION
Chart reviewed day 10. Spoke with daughter Yesenia bedside. Confirmed plan for return to [facility] at d/c  will be out of covid isolation tomorrow. Transport arranged thru [facility] at 2pm with [facility]. DNR on chart for signatures. Elisabeth Muñoz RN    Spoke with Lori JOHNSON. Stated patient is increasingly agitated and potentially aspirating. Call placed to Mary JOHNSON with [facility]. Is in house and will come evaluate patient for possible IPU need. Elisabeth Muñoz RN

## 2025-01-09 VITALS
HEART RATE: 65 BPM | RESPIRATION RATE: 18 BRPM | BODY MASS INDEX: 25.71 KG/M2 | TEMPERATURE: 97.5 F | HEIGHT: 63 IN | OXYGEN SATURATION: 91 % | DIASTOLIC BLOOD PRESSURE: 65 MMHG | SYSTOLIC BLOOD PRESSURE: 117 MMHG | WEIGHT: 145.1 LBS

## 2025-01-09 PROCEDURE — 6370000000 HC RX 637 (ALT 250 FOR IP): Performed by: NURSE PRACTITIONER

## 2025-01-09 PROCEDURE — 94640 AIRWAY INHALATION TREATMENT: CPT

## 2025-01-09 PROCEDURE — 6370000000 HC RX 637 (ALT 250 FOR IP)

## 2025-01-09 RX ORDER — MORPHINE SULFATE 100 MG/5ML
5 SOLUTION ORAL EVERY 4 HOURS PRN
Qty: 30 ML | Refills: 0 | Status: SHIPPED | OUTPATIENT
Start: 2025-01-09 | End: 2025-01-12

## 2025-01-09 RX ORDER — LORAZEPAM 0.5 MG/1
0.5 TABLET ORAL EVERY 4 HOURS
Status: SHIPPED | OUTPATIENT
Start: 2025-01-09 | End: 2025-01-12

## 2025-01-09 RX ADMIN — PRIMIDONE 50 MG: 50 TABLET ORAL at 09:15

## 2025-01-09 RX ADMIN — MEMANTINE 10 MG: 5 TABLET ORAL at 09:15

## 2025-01-09 RX ADMIN — DICYCLOMINE HYDROCHLORIDE 10 MG: 10 CAPSULE ORAL at 09:10

## 2025-01-09 RX ADMIN — PANTOPRAZOLE SODIUM 40 MG: 40 TABLET, DELAYED RELEASE ORAL at 09:15

## 2025-01-09 RX ADMIN — PROPRANOLOL HYDROCHLORIDE 20 MG: 40 TABLET ORAL at 09:16

## 2025-01-09 RX ADMIN — LORAZEPAM 0.5 MG: 0.5 TABLET ORAL at 09:12

## 2025-01-09 RX ADMIN — LORAZEPAM 0.5 MG: 0.5 TABLET ORAL at 03:17

## 2025-01-09 RX ADMIN — TIOTROPIUM BROMIDE INHALATION SPRAY 2 PUFF: 3.12 SPRAY, METERED RESPIRATORY (INHALATION) at 08:25

## 2025-01-09 RX ADMIN — LORAZEPAM 0.5 MG: 0.5 TABLET ORAL at 13:20

## 2025-01-09 RX ADMIN — DICYCLOMINE HYDROCHLORIDE 10 MG: 10 CAPSULE ORAL at 11:21

## 2025-01-09 RX ADMIN — QUETIAPINE FUMARATE 12.5 MG: 25 TABLET ORAL at 09:16

## 2025-01-09 RX ADMIN — SERTRALINE HYDROCHLORIDE 50 MG: 50 TABLET ORAL at 09:16

## 2025-01-09 RX ADMIN — FLUTICASONE PROPIONATE 1 SPRAY: 50 SPRAY, METERED NASAL at 09:10

## 2025-01-09 RX ADMIN — LEVOTHYROXINE SODIUM 125 MCG: 0.12 TABLET ORAL at 09:13

## 2025-01-09 NOTE — PROGRESS NOTES
Nutrition Note    Reason for Visit:   Length of Stay    Nutrition Assessment:      LOS assessment. Pt with end stage dementia. Per chart review, plan is for return to [place] with HOC services. Due to hospice status, patient will be followed at low nutrition risk. Dietitian will sign off.     If nutrition intervention is required, please submit a dietary consult.    Current Nutrition Therapies:    ADULT DIET; Dysphagia - Soft and Bite Sized    Anthropometrics:   Current Height: 160 cm (5' 2.99\")  Current Weight - Scale: 65.8 kg (145 lb 1.6 oz)      Monitoring and Evaluation:  No nutrition diagnosis. Patient will be monitored per nutrition standards of care.     Consult Dietitian if nutrition intervention essential to patient care is needed.     Discharge Planning:  No needs    Electronically signed by Casi Golden RD, LD on 1/6/25 at 2:43 PM EST    Contact: [phone]    
    V2.0    [facility] [place], [place] Progress Note      Name:  Radha Magallon /Age/Sex: 1935  (89 y.o. female)   MRN & CSN:  [id number] & [id number] Encounter Date/Time: 2025 6:11 PM EST   Location:  0348/0348-01 PCP: Baljeet Syed III, APRN - CNP     Attending:Juaquin Lugo MD       Hospital Day: 11    Assessment and Recommendations   Radha Magallon is a 89 y.o. female with pmh of hypertension, hyperlipidemia, hypothyroidism and dementia who presents with COVID-19 viremia    Interval History: Patients dispo pending being 10 days post covid + test, (Today 2025 is 10th day), saw patient this morning she is at her baseline had discussion with daughter about clinical course of dementia.  Patient continues to be stable pending availability of bed to discharge.    Plan:   COVID-19 viremia:   Patient was COVID-19 + (2024)  Patient symptom-free  On room air  Pyuria with bacteriuria (resolved)  Has received ceftriaxone x 3 no need to continue therapy  Syncopal episode  Rapid response called to patient being obtunded, sitting up unconscious.  Once patient moved to the bed she became more alert and oriented.  No postictal period noted.  No neuro deficits  EKG performed without new findings blood glucose normal, patient not acidotic  Likely secondary to propanolol and poor oral intake d/t dementia.   Essential tremor  Continue supportive care  PT OT  Hypothyroidism  Will continue home dose of levothyroxine  Hypomagnesemia  Magnesium 1.56 on 2025  Will monitor and replete as necessary  Dementia unspecified  Patient is consistently needing assistance during the day and had recent fall during night.  Consulted psychiatry recommendations below  Add routine Seroquel  Prn Ativan   Risperidone 0.75 mg nightly  Sertraline 50 mg daily  Memantine 10 mg twice daily  Transitioned to hospice/comfort care over the weekend.      Diet ADULT DIET; Dysphagia - Soft and Bite Sized   DVT Prophylaxis [x] Lovenox, []  Heparin, [] 
    V2.0    [facility] [place] Progress Note      Name:  Radha Magallon /Age/Sex: 1935  (89 y.o. female)   MRN & CSN:  [id number] & [id number] Encounter Date/Time: 2025 6:11 PM EST   Location:  0348/0348-01 PCP: Baljeet Syed III, APRN - CNP     Attending:Hannah Christiansen DO       Hospital Day: 8    Assessment and Recommendations   Radha Magallon is a 89 y.o. female with pmh of hypertension, hyperlipidemia, hypothyroidism and dementia who presents with COVID-19 viremia    Interval History: Patient appears as she has previous days, alert only to self, requesting assistance consistently.  She is in no acute distress vital signs stable. Patient had fall last night while trialing removal of avasys per discharge placement requirements.  Will consult psychiatry to help with medication recs to better keep patient safe.    Plan:   COVID-19 viremia:   Patient was COVID-19 + (2024)  Patient symptom-free  On room air  Pyuria with bacteriuria (resolved)  Has received ceftriaxone x 3 no need to continue therapy  Syncopal episode  Rapid response called to patient being obtunded, sitting up unconscious.  Once patient moved to the bed she became more alert and oriented.  No postictal period noted.  No neuro deficits  EKG performed without new findings blood glucose normal, patient not acidotic  Likely secondary to propanolol and poor oral intake d/t dementia.   Essential tremor  Continue supportive care  PT OT  Hypothyroidism  Will continue home dose of levothyroxine  Hypomagnesemia  Magnesium 1.56 on 2025  Will monitor and replete as necessary  Dementia unspecified  Patient is consistently needing assistance during the day and had recent fall during night.  Consulted psychiatry recommendations below  Patient has continued poor oral intake could consider adding mirtazapine.  Risperidone 0.75 mg nightly  Sertraline 50 mg daily  Memantine 10 mg twice daily      Diet ADULT DIET; Regular; Low Fat/Low Chol/High Fiber/VIDA 
    V2.0    [facility] [place]      Name:  Radha Magallon /Age/Sex: 1935  (89 y.o. female)   MRN & CSN:  [id number] & [id number] Encounter Date/Time: 2024 6:11 PM EST   Location:  0348/0348-01 PCP: Baljeet Syed III, APRN - CNP     Attending:Hannah Christiansen DO       Hospital Day: 4    Assessment and Recommendations   Radha Magallon is a 89 y.o. female with pmh of hypertension, hyperlipidemia, hypothyroidism and dementia who presents with COVID-19 viremia    Interval History: Saw patient this morning she was alert, oriented to place only. Was cooperative, and asked if I was the one who turned the TV on, as this was my first meeting with her today I told her it was not me. She had no acute complaints at this time, denies any pain or shortness of breath. She remains on room air.     Plan:   COVID-19 viremia:   Patient was COVID-19 infection  Some constitutional symptoms such as weakness and fatigue noted  CRP not significantly elevated  Patient was placed on 2 L nasal cannula however has been on room air since syncopal episode.  Pyuria with bacteriuria (resolved)  Has received ceftriaxone x 3 no need to continue therapy  Urine cultures grew less than 10,000 mixed skin/urogenital tara.  No further workup  Syncopal episode  Rapid response called to patient being obtunded, sitting up unconscious.  Once patient moved to the bed she became more alert and oriented.  No postictal period noted.  No neuro deficits  EKG performed without new findings blood glucose normal, patient not acidotic  Likely secondary to propanolol and poor oral intake d/t dementia.   Essential tremor  Continue supportive care  PT OT  Hypothyroidism  Will continue home dose of levothyroxine  Dementia unspecified  Risperidone 0.5 mg nightly  Sertraline 50 mg daily  Memantine 10 mg twice daily      Diet ADULT DIET; Regular; Low Fat/Low Chol/High Fiber/VIDA   DVT Prophylaxis [x] Lovenox, []  Heparin, [] SCDs, [] Ambulation,  [] 
    V2.0    [facility] [place]      Name:  Radha Magallon /Age/Sex: 1935  (89 y.o. female)   MRN & CSN:  [id number] & [id number] Encounter Date/Time: 2025 6:11 PM EST   Location:  0348/0348-01 PCP: Baljeet Syed III, APRN - CNP     Attending:Hannah Christiansen DO       Hospital Day: 5    Assessment and Recommendations   Radha Magallon is a 89 y.o. female with pmh of hypertension, hyperlipidemia, hypothyroidism and dementia who presents with COVID-19 viremia    Interval History: Saw patient this morning she appears at baseline and is in no acute distress we will continue to monitor awaiting placement.    Plan:   COVID-19 viremia:   Patient was COVID-19 infection  Some constitutional symptoms such as weakness and fatigue noted  CRP not significantly elevated  Patient was placed on 2 L nasal cannula however has been on room air since syncopal episode.  Pyuria with bacteriuria (resolved)  Has received ceftriaxone x 3 no need to continue therapy  Syncopal episode  Rapid response called to patient being obtunded, sitting up unconscious.  Once patient moved to the bed she became more alert and oriented.  No postictal period noted.  No neuro deficits  EKG performed without new findings blood glucose normal, patient not acidotic  Likely secondary to propanolol and poor oral intake d/t dementia.   Essential tremor  Continue supportive care  PT OT  Hypothyroidism  Will continue home dose of levothyroxine  Dementia unspecified  Risperidone 0.5 mg nightly  Sertraline 50 mg daily  Memantine 10 mg twice daily      Diet ADULT DIET; Regular; Low Fat/Low Chol/High Fiber/VIDA   DVT Prophylaxis [x] Lovenox, []  Heparin, [] SCDs, [] Ambulation,  [] Eliquis, [] Xarelto  [] Coumadin   Code Status Limited   Disposition From: [facility] [facility]  Expected Disposition: [facility]  Estimated Date of Discharge: 2024  Patient requires continued admission due to COVID-19 symptoms   Surrogate Decision 
    V2.0    [facility] [place] [facility]      Name:  Radha Magallon /Age/Sex: 1935  (89 y.o. female)   MRN & CSN:  [id number] & [id number] Encounter Date/Time: 2025 6:11 PM EST   Location:  0348/0348-01 PCP: Baljeet Syed III, APRN - CNP     Attending:Juaquin Lugo MD       Hospital Day: 10    Assessment and Recommendations   Radha Magallon is a 89 y.o. female with pmh of hypertension, hyperlipidemia, hypothyroidism and dementia who presents with COVID-19 viremia    Interval History: Patient was sleeping, had extensive discussion with DPOA daughter about placement.  All questions were answered patient is stable for discharge pending placement    Plan:   COVID-19 viremia:   Patient was COVID-19 + (2024)  Patient symptom-free  On room air  Pyuria with bacteriuria (resolved)  Has received ceftriaxone x 3 no need to continue therapy  Syncopal episode  Rapid response called to patient being obtunded, sitting up unconscious.  Once patient moved to the bed she became more alert and oriented.  No postictal period noted.  No neuro deficits  EKG performed without new findings blood glucose normal, patient not acidotic  Likely secondary to propanolol and poor oral intake d/t dementia.   Essential tremor  Continue supportive care  PT OT  Hypothyroidism  Will continue home dose of levothyroxine  Hypomagnesemia  Magnesium 1.56 on 2025  Will monitor and replete as necessary  Dementia unspecified  Patient is consistently needing assistance during the day and had recent fall during night.  Consulted psychiatry recommendations below  Add routine Seroquel  Prn Ativan   Risperidone 0.75 mg nightly  Sertraline 50 mg daily  Memantine 10 mg twice daily  Transitioned to hospice/comfort care over the weekend.      Diet ADULT DIET; Dysphagia - Soft and Bite Sized   DVT Prophylaxis [x] Lovenox, []  Heparin, [] SCDs, [] Ambulation,  [] Eliquis, [] Xarelto  [] Coumadin   Code Status Limited   Disposition From: [place] 
    V2.0    [facility] – [place] Progress Note      Name:  Radha Magallon /Age/Sex: 1935  (89 y.o. female)   MRN & CSN:  [id number] & [id number] Encounter Date/Time: 2024 6:11 PM EST   Location:  03480348-01 PCP: Baljeet Syed III, APRN - CNP     Attending:Hannah Christiansen DO       Hospital Day: 3    Assessment and Recommendations   Radha Magallon is a 89 y.o. female with pmh of hypertension, hyperlipidemia, hypothyroidism and dementia who presents with COVID-19 viremia    Interval History: Saw patient this morning she was alert, oriented to place, incorrectly guessed the day twice, would not guess year and had to be reminded of question twice, answered for current president to be Arline.  She was in no acute distress appears at baseline.    Plan:   COVID-19 viremia:   Patient was COVID-19 infection  Some constitutional symptoms such as weakness and fatigue noted  CRP not significantly elevated  Patient was placed on 2 L nasal cannula however has been on room air since syncopal episode.  Pyuria with bacteriuria (resolved)  Has received ceftriaxone x 3 no need to continue therapy  Urine cultures grew less than 10,000 mixed skin/urogenital tara.  No further workup  Syncopal episode  Rapid response called to patient being obtunded, sitting up unconscious.  Once patient moved to the bed she became more alert and oriented.  No postictal period noted.  No neuro deficits  EKG performed without new findings blood glucose normal, patient not acidotic  Essential tremor  Continue supportive care  PT OT  Hypothyroidism  Will continue home dose of levothyroxine  Dementia unspecified  Risperidone 0.5 mg nightly  Sertraline 50 mg daily  Memantine 10 mg twice daily      Diet ADULT DIET; Regular; Low Fat/Low Chol/High Fiber/VIDA   DVT Prophylaxis [x] Lovenox, []  Heparin, [] SCDs, [] Ambulation,  [] Eliquis, [] Xarelto  [] Coumadin   Code Status DNR-CCA   Disposition From: [facility]  Expected Disposition: 
   01/04/25 1128   RT Protocol   History Pulmonary Disease 0   Respiratory pattern 0   Breath sounds 2   Cough 0   Indications for Bronchodilator Therapy None   Bronchodilator Assessment Score 2       
  Hospital Medicine Progress Note  V 10.25      Date of Admission: 12/28/2024    Hospital Day: 9      Chief Admission Complaint:  Weakness    Subjective:  Patient seen and examined this morning. She is anxious and calling out for help.     Presenting Admission History:       Radha Magallon is a/an 89 y.o. female with a significant past medical history of hypertension, hyperlipidemia, hypothyroidism, and dementia who presents to [facility]'s emergency department with daughters at bedside who note they were called by the care facility after finding the patient slumped over in her chair in her room. Staff reported she was moaning and minimally responsive so she was brought here for an evaluation. Daughters note she has a new cough as of today, but she was exposed to a granddaughter on Amena Day who turns out had COVID. Her evaluation here included laboratory studies, EKG, and chest x-ray. Chest x-ray did not show acute findings. Laboratory studies were reviewed, pertinent for sodium 130, chloride 95, CO2 20 , glucose 105, CRP 10, troponin 30->28, and unremarkable cell count. Urinalysis showed cloudy urine, 40 ketones, trace LE; microscopy showed 6-9 urinary WBCs, 1+ bacteria. COVID test was positive. She is not requiring oxygen at this time. ED felt compelled to admit for further evaluation. Hospital team was consulted to admit. Patient was monitored during admission, had a rapid response called for a syncopal event in chair, evaluation after event was negative for acute process, no seizure no cardiopulmonary event. Likely secondary to hypotension from poor oral intake and current medications to control tremors. Patient is medically stable for discharge pending placement.     Assessment/Plan:      Current Principal Problem:  COVID-19 viremia    COVID-19   - Satting well on RA   - Continue supportive care      UTI  - Complete 3 day course of Ceftriaxone      Syncopal Episode  - No additional episodes 
4 Eyes Skin Assessment     NAME:  Radha Magallon  YOB: 1935  MEDICAL RECORD NUMBER:  [id number]    The patient is being assessed for  Admission    I agree that at least one RN has performed a thorough Head to Toe Skin Assessment on the patient. ALL assessment sites listed below have been assessed.      Areas assessed by both nurses:    Head, Face, Ears, Shoulders, Back, Chest, Arms, Elbows, Hands, Sacrum. Buttock, Coccyx, Ischium, Legs. Feet and Heels, and Under Medical Devices         Does the Patient have a Wound? No noted wound(s)       Joseph Prevention initiated by RN: Yes  Wound Care Orders initiated by RN: No    Pressure Injury (Stage 3,4, Unstageable, DTI, NWPT, and Complex wounds) if present, place Wound referral order by RN under : No    New Ostomies, if present place, Ostomy referral order under : No     Nurse 1 eSignature: Electronically signed by Marion Luz RN on 12/29/24 at 4:47 AM EST    **SHARE this note so that the co-signing nurse can place an eSignature**    Nurse 2 eSignature: Electronically signed by Vernell Sharma RN on 12/29/24 at 6:58 AM EST   
Attempted to call pt's daughter Yesenia to notify her of pt's fall. No answer and voicemail box was full. Called secondary person on the list, her daughter Barbara and spoke to her. Notified her that STAT CT of head was ordered and she requested that the nightshift RN call with once results are in. Informed ALEX Henriquez to call family with results of CT head.   
Avasure turned off and taken out of pt's room.   
Avasure was turned off at 5:30pm. Family is bedside.   
Discharge education provided both verbally and written, patient verbalized understanding, denies questions. Patient left with all belongings including prescriptions. Supplies given to patient as needed to continue care at home as ordered. Pt left to [place] via transport. Attempted to call report to Mine at [facility], message left with return number.   
HOSPICE [facility]    Met with all 5 children including MARLENY Patterson to discuss hospice philosophy and services.  They are agreeable to hospice and would like patient to return to [facility] with HOC.  Per Yesenia, she has talked to Maribel and Britt at [facility] and they told her in the past that they would be willing to accept patient back as long as she had hospice involved.  However, [facility]'s last understanding was for patient to go SNF first.  Yesenia is also unsure if they will have a private room as patient with COVID.    Updated CM Emy.  Messages left for [facility] staff Maribel SHEARER and [place] [phone] and [facility] [phone].    Family agreeable to DNRCC and [place] form placed on chart for MD signature.  
[facility]    Met with daughter/HCPERNESTO De León and consents obtained.  Talked to Britt at [facility] and plan is for discharge to [facility] with HOC.  Transport arranged with [facility] for 2pm.  Awaiting discharge orders and comfort scripts.  Updated nursing and CM Kit.  
Hospice [facility]    Call to Maribel in admissions at [facility], left . Call to Maren Patterson to offer support, answer any questions regarding hospice. Plan is  [facility] unit at discharge. Patient will need to be 10 days post Covid+ for shared room, likely 1/9/24. Call to Tanesha, director of Memory Care at [facility], discussion occurred regarding plan. Patient will need DME, but room not ready for delivery today. Given HOC contact info. DME to be ordered per HOC when appropriate. Call to [facility]INER, left [facility]. Thank you for the opportunity to serve this patient.     Radha Lam RN   [phone] [place]  [phone] Office    
[facility]    Contacted daughter, Yesenia, to see about meeting with HOC RN to review arrangements and have HOC consent completed.  Yesenia explained that she left the hospital this morning, plans to come back to the hospital this evening, and will be present at bedside throughout tomorrow afternoon.  Reviewed discharge arrangements with Yesenia and instructed her to call HOC RN with any additional questions or concerns, if needed today.  Plan will be for HOC RN to meet with Yesenia tomorrow to have consent signed and finalize discharge arrangements. Contacted Maribel, at [place], and left .  Awaiting return phone call to review plans and discharge arrangements.    Thank you,  Mary Benoit, BSN, RN  [phone]  
Occupational Therapy  [address]  Occupational Therapy Initial Assessment    Name: Radha Magallon  : 1935  MRN: [id number]  Date of Service: 2024    Discharge Recommendations:  Subacute/Skilled Nursing Facility  Therapy discharge recommendations take into account each patient's current medical complexities and are subject to input/oversight from the patient's healthcare team.   Barriers to Home Discharge:      [x] Unable to transfer, ambulate, or propel wheelchair household distances without assist   [x] Limited available assist at home upon discharge    [x]  family requests d/c to post-acute facility    [x] Poor cognition/safety awareness for d/c to home alone        If pt is unable to be seen after this session, please let this note serve as discharge summary.  Please see case management note for discharge disposition.  Thank you.           Patient Diagnosis(es): The primary encounter diagnosis was COVID. Diagnoses of Elevated troponin and Elevated brain natriuretic peptide (BNP) level were also pertinent to this visit.  Past Medical History:  has a past medical history of Acid fast bacillus, Bronchiectasis (HCC), Dementia (HCC), DJD (degenerative joint disease) of knee, Environmental allergies, Hemoptysis, HTN (hypertension), benign, Hyperlipidemia, Hypothyroidism, Lung disease, Mycobacterial infection, non-TB, Pneumonia, Pulmonary nodule, and Tremor, essential.  Past Surgical History:  has a past surgical history that includes Tonsillectomy; Eye surgery; and bronchoscopy.           Assessment  Performance deficits / Impairments: Decreased functional mobility ;Decreased ADL status;Decreased endurance;Decreased strength;Decreased safe awareness;Decreased cognition;Decreased balance  Assessment: pt from [facility] , normally indepenent with functional transfers, min assist of 1 with functional mobility with RW, assist with BADL's; admitted for generalized weaknes +CoVID, now requiring 
Occupational Therapy  Facility/Department: [facility] C3 TELE/MED SURG/ONC  Daily Treatment Note  NAME: Radha Magallon  : 1935  MRN: [id number]    Date of Service: 2025    Discharge Recommendations:  Subacute/Skilled Nursing Facility   Barriers to Home Discharge:   [] Steps to access home entry or bed/bath:   [x] Unable to transfer, ambulate, or propel wheelchair household distances without assist   [x] Limited available assist at home upon discharge    [] Patient or family requests d/c to post-acute facility    [x] Poor cognition/safety awareness for d/c to home alone    [] Unable to maintain ordered weight bearing status    [x] Patient with salient signs of long-standing immobility   [x] Decreased independence with ADLs   [x] Increased risk for falls   [] Other:   If pt is unable to be seen after this session, please let this note serve as discharge summary.  Please see case management note for discharge disposition.  Thank you  Patient Diagnosis(es): The primary encounter diagnosis was COVID. Diagnoses of Elevated troponin and Elevated brain natriuretic peptide (BNP) level were also pertinent to this visit.     Assessment   Assessment: Pt seen for OT tx for ADLs and mobility. Stedy was used for bathroom mobility d/t impaired balance and pt's request to use bathroom urgently.  Pt requires total assist for LE ADLs for pericare and dressing.  Performance with ADLs is affected by tremors in BUE and endurance.  Pt was able to take steps with RW and min x2, max cues for safe technique.  She would benefit from SNF for skilled OT. Cont OT in acute care.  Co-tx collaboration this date to safely meet goals and will have better occupational performance outcomes with in a co-treatment than 1:1 session.    Activity Tolerance: Patient limited by fatigue;Patient limited by endurance;Treatment limited secondary to decreased cognition  Discharge Recommendations: Subacute/Skilled Nursing Facility     Plan  Occupational 
Palliative Care Following.     Plan was to try to optimize at SNF, but there was concern for outcome.    Patient then had fall while inpatient.   Family switched GOC to comfort.    Patient will return to [place] with [facility] once out of isolation  
Patient admitted to room 439 from ED, no family present to assist in admission questions. Patient has hx of dementia, only oriented to self.    
Patient admitted to room 439 from ED.  Patient oriented to room, call light, bed rails, phone, lights and bathroom.  Patient instructed about the schedule of the day including: vital sign frequency, lab draws, possible tests, frequency of MD and staff rounds, including RN/MD rounding together at bedside, daily weights, and I &O's.  Patient instructed about prescribed diet, how to use 8MENU, and television. Bed alarm in place, patient aware of placement and reason.  Bed locked, in lowest position, side rails up 2/4, call light within reach.    
Patient increasingly agitated constantly crying and calling out. Toilet needs and intakes offered but will continue to call out. Night meds given , will continue to monitor.  
Patient very unsettled even dose of Seroquel , still attempting to get out of bed  
Physical Therapy  Facility/Department: [facility] C3 TELE/MED SURG/ONC  Daily Treatment Note  NAME: Radha Magallon  : 1935  MRN: [id number]    Date of Service: 2024    Discharge Recommendations:  Subacute/Skilled Nursing Facility   PT Equipment Recommendations  Equipment Needed: No  Other: defer    Therapy discharge recommendations take into account each patient's current medical complexities and are subject to input/oversight from the patient's healthcare team.   Barriers to Home Discharge:   [] Steps to access home entry or bed/bath:   [x] Unable to transfer, ambulate, or propel wheelchair household distances without assist   [] Limited available assist at home upon discharge    [] Patient or family requests d/c to post-acute facility    [x] Poor cognition/safety awareness    []Unable to maintain ordered weight bearing status    [] Patient with salient signs of long-standing immobility   [x] Patient is at risk for falls    [] Other:    If pt is unable to be seen after this session, please let this note serve as discharge summary.  Please see case management note for discharge disposition.  Thank you.    Patient Diagnosis(es): The primary encounter diagnosis was COVID. Diagnoses of Elevated troponin and Elevated brain natriuretic peptide (BNP) level were also pertinent to this visit.    Assessment  Assessment: Pt seen as cotx with OT due to cognition and need for assistance for safe mobility progressions towards goals. Pt tolerated treatment session fairly well this date, able to perform bed mobility with min Ax2 and grossly min Ax2 for transfers and gait in room with B HHA up to 10 ft at a time. Pt limited by cognition and endurance/balance, but would continue to benefit from skilled therapy during LOS to progress mobility as tolerated. Continue to recommend SNF at d/c in light of current deficits.  Activity Tolerance: Patient tolerated treatment well;Treatment limited secondary to decreased 
Physical/Occupational Therapy    Per chart review plan is to return to memory care with hospice services. Pt with no further acute PT/OT needs at this time and will sign off. Thank you.     Elizabteh Francisco PT, DPT  [facility] OTR/L     
Pt A/O with confusion, VSS. Pt anxious this morning and states that she \"feels impatient\". Pt going to RR X1 with aid of stedy and tolerating fairly well. Pt voiding clear yellow urine into toilet. Avasys remains in place. Standard safety precuations in place. Pt 96% on RA. Electronically signed by DANIEL YOU RN on 1/1/25 at 9:06 AM EST   
Pt Alert to self and place. VSS, assessment complete. Pt reports back hurting. Gave Tylenol. Pt family at bedside. Pt has anxiety and needs to use bathroom a lot. PRN ativan given to help pt go to sleep. Consult for Hospice. Pt was supposed to leaving for [place] yesterday after 3:15pm but she fell at shift change the night before and the RN had to put the avasure back in the room for her safety.   
Pt a/o. VSS. Shift assessment updated and documented. Patient sound asleep post fall , Head CT done , family called , spoke to Yesenia and gave updates. Avasys reordered to increase patient safety overnight , plan to reevaluate. Assisted with toileting and rest of needs.    
Pt a/o. VSS. Shift assessment updated and documented. Patient up and ambulant to and from the bathroom , upto the chair this morning. NGT in place hooked to CLWS.  Pain needs addressed as needed , also has Ativan for anxiety. Hypoactive Bowels sounds. Not passing any gas as yet .   
Pt assessment completed and charted. VSS. Pt a/o to self and place. Comfort medications given as needed. Bed in lowest position and wheels locked. Call light within reach. Bedside table within reach. Non-skid socks in place. Pt denies any other needs at this time.  Pt family calls out appropriately.  
Pt assessment completed and charted. VSS. Pt a/o to self only. PIV removed per nursing communication order. Toileted pt and returned to bed. Bed in lowest position and wheels locked. Call light within reach. Bedside table within reach. Non-skid socks in place. Pt denies any other needs at this time.  
Pt assessment completed and charted. VSS. Pt a/o to self. Pt has pulled IV out. Pt ambulates with stedy x1 to bathroom. Pt has tremors and needs help with feeding and drinking. Bed in lowest position and wheels locked. Call light within reach. Bedside table within reach. Non-skid socks in place. Pt denies any other needs at this time.   
Pt assessment completed. VSS. Family at bedside. Comfort care/Hospice in place. Pt ambulates x1 w/Stedy.  Family helps balance pt on stedy. Pt is alert to self only tonight. Pt reports no pain. Bed at lowest position. Call light and bedside table within reach.  
Pt requesting RN and PCA to assist her to go use the restroom. Stedy used in AM and RN feels unsafe to do again. Bedside commode provided in room. RN and PCA attempted x2 with bedside commode and Pt is doing <25% of transfer and RN does not feel it is safe for pt to continue using bedside commode. Pt and family aware and educated pt and family of use of purwick. Pt refusing purwick at this time.   
Pt transferred to C3. Patient cleaned prior to floor change. Called and updated CMU and Avasure with patient's new location and camera number. Patient stated she has to use toilet but was just cleaned. Left bed alarm on. Patient has call light near bedside. Precautions placed. Currently sleeping in bed.  
RT Inhaler-Nebulizer Bronchodilator Protocol Note    There is a bronchodilator order in the chart from a provider indicating to follow the RT Bronchodilator Protocol and there is an “Initiate RT Inhaler-Nebulizer Bronchodilator Protocol” order as well (see protocol at bottom of note).    CXR Findings:  No results found.    The findings from the last RT Protocol Assessment were as follows:   History Pulmonary Disease: None or smoker <15 pack years  Respiratory Pattern: Regular pattern and RR 12-20 bpm  Breath Sounds: Clear breath sounds  Cough: Strong, spontaneous, non-productive  Indication for Bronchodilator Therapy: None  Bronchodilator Assessment Score: 0    Aerosolized bronchodilator medication orders have been revised according to the RT Inhaler-Nebulizer Bronchodilator Protocol below.    Respiratory Therapist to perform RT Therapy Protocol Assessment initially then follow the protocol.  Repeat RT Therapy Protocol Assessment PRN for score 0-3 or on second treatment, BID, and PRN for scores above 3.    No Indications - adjust the frequency to every 6 hours PRN wheezing or bronchospasm, if no treatments needed after 48 hours then discontinue using Per Protocol order mode.     If indication present, adjust the RT bronchodilator orders based on the Bronchodilator Assessment Score as indicated below.  Use Inhaler orders unless patient has one or more of the following: on home nebulizer, not able to hold breath for 10 seconds, is not alert and oriented, cannot activate and use MDI correctly, or respiratory rate 25 breaths per minute or more, then use the equivalent nebulizer order(s) with same Frequency and PRN reasons based on the score.  If a patient is on this medication at home then do not decrease Frequency below that used at home.    0-3 - enter or revise RT bronchodilator order(s) to equivalent RT Bronchodilator order with Frequency of every 4 hours PRN for wheezing or increased work of breathing using Per 
Shift assessment completed.  Pt A&O to self,pt knows her name and , and that she was in a hospital. She wasn't able to tell me the year or month. VSS. Pt took pills whole with water. Pt is a total feed. X1 assist with a stedy. Video monitoring active for safety. Bed alarm active for safety. Bed locked and in lowest position.  Call light and bedside table within reach. Will continue to monitor.    
Shift assessment completed. Patient is A&O to self and that is it. VSS. Denies Pain. IV site patent, flushed, and infusing. Patient on RA. Patient admits to passing gas. Patient ambulates x1 with steady to bathroom. Medication given per MAR. Psych is here seeing the Pt. Pt is very anxious and does not want to be left alone. CM put a consult in for hospice. Pt was supposed to leaving for [place] today after 3:15pm but she fell at shift change last night and the RN had to put the avasure back in the room for her safety.             Safety Measures in place:   Denies any needs at this time.   Video monitoring in place.  Bed/ Chair alarm on for safety.   Bed locked and in lowest position.    Call light within reach.   Gripper socks applied.   Patient in stable condition when RN leaving room.   
Shift assessment completed. Patient is A&O x1. VSS. Pain 1/10.  IV site patent, flushed, and saline locked. Patient on RA. Patient admits to passing gas. Patient ambulates x2 with the steady to bathroom. Medication given per MAR. Pt is in bed sleeping family at bedside.             Safety Measures in place:   Denies any needs at this time.   Bed/ Chair alarm on for safety.   Bed locked and in lowest position.    Call light within reach.   Gripper socks applied.   Patient in stable condition when RN leaving room.   
Shift assessment completed. Patient is A&O x1. VSS. Pain 1/10. Pt has no IV access. Patient on RA. Patient's last BM- was Tuesday 12/31. Patient admits to passing gas. Patient ambulates x1 with the steady to bathroom. Medication given per MAR.           Safety Measures in place:   Denies any needs at this time.   Video monitoring in place.  Bed/ Chair alarm on for safety.   Bed locked and in lowest position.    Call light within reach.   Gripper socks applied.   Patient in stable condition when RN leaving room.   
Shift assessment completed. Patient is A&O x4.  VSS. Denies Pain. IV site patent, flushed, and saline locked. Patient on RA.Patient ambulates x1 with the steady to bathroom. Medication given per MAR.             Safety Measures in place:   Denies any needs at this time.   Video monitoring in place.  Bed/ Chair alarm on for safety.   Bed locked and in lowest position.    Call light within reach.   Gripper socks applied.   Patient in stable condition when RN leaving room.   
Shift assessment completed. Patient is A&O x4. VSS. Denies Pain. IV site patent, flushed, and infusing. Patient on RA. Patient admits to passing gas. Patient ambulates with the steady x1 to bathroom. Medication given per MAR. Pt tremors are worse today and do not seem to calm down with her medication.          Safety Measures in place:   Denies any needs at this time.   Video monitoring in place.  Bed/ Chair alarm on for safety.   Bed locked and in lowest position.    Call light within reach.   Gripper socks applied.   Patient in stable condition when RN leaving room.   
Spoke to Yesenia (Daughter, Patient's POA) wanting to be referred to a Palliative Nurse.   
[facility]    Plan is for patient to return to [place] in their Memory care unit.  Will meet up with daughter/MARLENY Patterson tomorrow morning to get consents.  DME to be delivered tomorrow.  Transport set up with [facility] for 1/9 2pm.    Will need DNR form signed by MD.  Placed in [facility].  Will need comfort scripts printed and signed.  Would recommend:  Ativan 0.5 mg every 4 hours prn  Roxanol 20 mg/ml 30 ml 5 mg every 4 hours prn  Levsin 0.125 mg every 4 hours prn.    Discussed with CM Kit and Admission coordinator Maribel at [place].  
time  Cognition  Overall Cognitive Status: Exceptions  Arousal/Alertness: Appropriate responses to stimuli  Following Commands: Follows one step commands with increased time;Follows one step commands with repetition  Attention Span: Attends with cues to redirect  Memory: Decreased recall of recent events;Decreased recall of precautions  Safety Judgement: Decreased awareness of need for safety;Decreased awareness of need for assistance  Problem Solving: Assistance required to generate solutions  Insights: Decreased awareness of deficits  Initiation: Requires cues for some  Sequencing: Requires cues for some  Cognition Comment: pt repeating \"help me\", unsure of what helps she wants, and states \"don't leave me\"    Objective  Vitals     Bed Mobility Training  Bed Mobility Training: Yes  Interventions: Verbal cues;Safety awareness training;Tactile cues;Weight shifting training/pressure relief  Supine to Sit: Assist X2 (Min A of 2)  Sit to Supine: Assist X2 (Mod A of 2)  Scooting:  (Max A of 2 to scoot to hob)  Balance  Sitting: Impaired  Sitting - Static: Fair (occasional)  Sitting - Dynamic: Fair (occasional)  Standing: Impaired  Standing - Static: Constant support;Fair  Standing - Dynamic: Constant support;Fair  Transfer Training  Transfer Training: Yes  Interventions: Safety awareness training;Verbal cues;Tactile cues;Visual cues;Weight shifting training/pressure relief  Sit to Stand: Assist X2 (Min A of 2 to RW from bed ; initial stand to stedy due to urgency to use bathroom and pt stating feeling like falling despite lying in bed)  Stand to Sit: Assist X2 (Min A of 2 to sit on commode and to recliner; verbal cues for hand placement, pt lacks eccentric control without assist)  Bed to Chair: Assist X2 (MIn A of 2 ; initial use of stedy due to urgency to use bathroom and stating feeling like falling)  Toilet Transfer: Minimum assistance;Assist X2;Adaptive equipment  Gait  Gait Training: Yes  Overall Level of 
        ------------------------------------------------------------------------------------------------------------------------------------------------------------------------    MDM    Patient with 1 or more chronic illnesses with severe exacerbation or side effects of treatment and/or 1 acute or chronic illness that poses threat to life or bodily function.    Decision regarding hospitalization or escalation  Patient on drug therapy that requires intensive monitoring.     Medications:  Personally reviewed in detail in conjunction w/ labs as documented for evidence of drug toxicity.     Infusion Medications    sodium chloride       Scheduled Medications    levothyroxine  125 mcg Oral Daily    memantine  10 mg Oral BID    pantoprazole  40 mg Oral QAM AC    primidone  50 mg Oral BID    propranolol  20 mg Oral 4x Daily    risperiDONE  0.5 mg Oral Nightly    sertraline  50 mg Oral Daily    sodium chloride flush  5-40 mL IntraVENous 2 times per day    enoxaparin  40 mg SubCUTAneous Daily    tiotropium  2 puff Inhalation Daily RT    cefTRIAXone (ROCEPHIN) IV  1,000 mg IntraVENous Q24H    albuterol sulfate HFA  2 puff Inhalation Q4H WA RT     PRN Meds: sodium chloride flush, sodium chloride, ondansetron **OR** ondansetron, polyethylene glycol, acetaminophen **OR** acetaminophen, guaiFENesin-dextromethorphan     Labs:  Personally reviewed and interpreted for clinical significance.     Recent Labs     12/29/24 0050 12/29/24  1316   WBC 7.0  --    HGB 13.3 13.4   HCT 38.8  --      --      Recent Labs     12/29/24 0050   *   K 4.0   CL 95*   CO2 20*   BUN 15   CREATININE 0.8   CALCIUM 9.2     Recent Labs     12/29/24 0050 12/29/24  0238   PROBNP 4,251*  --    TROPHS 30* 28*     No results for input(s): \"LABA1C\" in the last 72 hours.  Recent Labs     12/29/24  0050   AST 24   ALT 13   BILITOT 0.5   ALKPHOS 76     No results for input(s): \"INR\", \"LACTA\", \"TSH\" in the last 72 hours.    Urine Cultures:   Lab 
for discharge pending placement.        Review of Systems:      Pertinent positives and negatives discussed in HPI    Objective:     Intake/Output Summary (Last 24 hours) at 1/3/2025 0711  Last data filed at 1/2/2025 1730  Gross per 24 hour   Intake 220 ml   Output --   Net 220 ml      Vitals:   Vitals:    01/02/25 1607 01/02/25 1639 01/02/25 1754 01/02/25 2055   BP:  (!) 146/93 124/65 129/69   Pulse:  63 67 57   Resp: 18 18  16   Temp:  98.2 °F (36.8 °C)  98.1 °F (36.7 °C)   TempSrc:  Oral  Oral   SpO2:  97%  96%   Weight:       Height:             Physical Exam:      Physical Exam  Constitutional:       General: She is not in acute distress.     Appearance: Normal appearance. She is not ill-appearing.   Cardiovascular:      Rate and Rhythm: Normal rate and regular rhythm.      Pulses: Normal pulses.      Heart sounds: Normal heart sounds.   Pulmonary:      Effort: Pulmonary effort is normal.      Breath sounds: Normal breath sounds.   Neurological:      Mental Status: She is alert. Mental status is at baseline.      Motor: Tremor present.      Comments: Noted Essential Tremor   Psychiatric:         Mood and Affect: Mood normal.      Comments: Alert only to self          Medications:   Medications:    risperiDONE  0.75 mg Oral Nightly    fluticasone  1 spray Each Nostril Daily    primidone  50 mg Oral TID    levothyroxine  125 mcg Oral Daily    memantine  10 mg Oral BID    pantoprazole  40 mg Oral QAM AC    propranolol  20 mg Oral 4x Daily    sertraline  50 mg Oral Daily    sodium chloride flush  5-40 mL IntraVENous 2 times per day    enoxaparin  40 mg SubCUTAneous Daily    tiotropium  2 puff Inhalation Daily RT    albuterol sulfate HFA  2 puff Inhalation Q4H WA RT      Infusions:    sodium chloride       PRN Meds: benzocaine-menthol, 1 lozenge, Q2H PRN  sodium chloride flush, 5-40 mL, PRN  sodium chloride, , PRN  ondansetron, 4 mg, Q8H PRN   Or  ondansetron, 4 mg, Q6H PRN  polyethylene glycol, 17 g, Daily 
Static: Fair  Standing - Dynamic: Fair  Comments: uses RW for support, and support at gait belt    Exercise Treatment: Ankle pumps: 10 x B  LAQ: 5 x B  Seated march: 5 x B  Seated clam shell: 5xB   Alternate UE Row: 10 x B  Diaphragmatic breathin reps         AM-PAC - Mobility    AM-PAC Basic Mobility - Inpatient   How much help is needed turning from your back to your side while in a flat bed without using bedrails?: A Little  How much help is needed moving from lying on your back to sitting on the side of a flat bed without using bedrails?: A Lot  How much help is needed moving to and from a bed to a chair?: A Lot  How much help is needed standing up from a chair using your arms?: A Little  How much help is needed walking in hospital room?: A Lot  How much help is needed climbing 3-5 steps with a railing?: Total  AM-PAC Inpatient Mobility Raw Score : 13  AM-PAC Inpatient T-Scale Score : 36.74  Mobility Inpatient CMS 0-100% Score: 64.91  Mobility Inpatient CMS G-Code Modifier : CL       Goals  Short Term Goals  Time Frame for Short Term Goals: 1 week () unless otherwise specified  Short Term Goal 1: pt to perform bed mobility with CG  Short Term Goal 2: pt to perform transfers wtih CG  Short Term Goal 3: pt to amb with walker and CG at least 50 ft  Short Term Goal 4: pt to participate in 12-15 reps therapeutic ex by 1/3  Patient Goals   Patient Goals : Pt with hx dementia, did not state goals       Education  Patient Education  Education Given To: Patient  Education Provided: Role of Therapy;Transfer Training;Mobility Training;Plan of Care  Education Method: Demonstration;Verbal  Barriers to Learning: Cognition  Education Outcome: Continued education needed      Therapy Time   Individual Concurrent Group Co-treatment   Time In 1210         Time Out 1248         Minutes 38         Timed Code Treatment Minutes: 26 Minutes       Deisy Garrido, PT         
cueing  Grooming Skilled Clinical Factors: min Ax2 for balance in stance at sink; VC for location of task objects for hand hygiene, min A to use soap dispencer  Toileting: Maximum assistance  Toileting Skilled Clinical Factors: max A for clothing management down in stance, able to perform radha hygiene while seated on toilet w/ max VC and CGA; pt able to pull brief up over hips in stance w/ min Ax2 for balance  Functional Mobility: Dependent/Total  Functional Mobility Skilled Clinical Factors: min Ax2 w/ BUE HHA to/from bathroom  OT Exercises  Static Standing Balance Exercises: stance at sink to peform grooming tasks for 2 mins w/ min Ax2 for balance     Safety Devices  Type of Devices: All fall risk precautions in place;Call light within reach;Patient at risk for falls;Nurse notified;Left in bed;Gait belt;Bed alarm in place;Telesitter in use  Restraints  Restraints Initially in Place: No    Patient Education  Education Given To: Patient  Education Provided: Role of Therapy;Transfer Training;Plan of Care;Equipment;Precautions;Orientation;Mobility Training  Education Provided Comments: Pt educated on importance of OOB mobility, prevention of complications of bedrest, and general safety during hospitalization  Education Method: Demonstration;Verbal  Barriers to Learning: Cognition  Education Outcome: Unable to verbalize;Unable to demonstrate understanding;Continued education needed    Goals  Short Term Goals  Time Frame for Short Term Goals: 1 week(1-05-24)- goals ongoing 12/31  Short Term Goal 1: CGA with functional/toilet transfers by 1-05-24- min Ax2 w/ BUE HHA 12/31  Short Term Goal 2: min assist with bathroom mobility with RW by 1-03-24- min Ax2 w/ BUE HHA 12/31  Short Term Goal 3: CGA standing ADL's at sink for 2 minutes- min Ax2 for 2 mins 12/31  Short Term Goal 4: tolerate 2 sets of 10 reps BUE strengthening exercises to increase strength for transfers, ADL's  Patient Goals   Patient goals : pt unable to 
CLOUDY 12/29/2024 12:50 AM    SPECGRAV 1.015 11/12/2024 12:20 PM    LEUKOCYTESUR TRACE 12/29/2024 12:50 AM    UROBILINOGEN 0.2 12/29/2024 12:50 AM    BILIRUBINUR Negative 12/29/2024 12:50 AM    BLOODU Negative 12/29/2024 12:50 AM    GLUCOSEU Negative 12/29/2024 12:50 AM    KETUA 40 12/29/2024 12:50 AM     Urine Cultures:   Lab Results   Component Value Date/Time    LABURIN  12/29/2024 12:50 AM     <10,000 CFU/ml mixed skin/urogenital tara. No further workup     Blood Cultures: No results found for: \"BC\"  No results found for: \"BLOODCULT2\"  Organism: No results found for: \"ORG\"      Electronically signed by Elliott Marie DO on 1/2/2025 at 10:11 AM    
Negative 12/29/2024 12:50 AM    BLOODU Negative 12/29/2024 12:50 AM    GLUCOSEU Negative 12/29/2024 12:50 AM    KETUA 40 12/29/2024 12:50 AM     Urine Cultures:   Lab Results   Component Value Date/Time    LABURIN  12/29/2024 12:50 AM     <10,000 CFU/ml mixed skin/urogenital tara. No further workup     Blood Cultures: No results found for: \"BC\"  No results found for: \"BLOODCULT2\"  Organism: No results found for: \"ORG\"      Electronically signed by Elliott Marie DO on 1/8/2025 at 7:10 AM    
external note(s) from each unique source relevant to today's visit: Hospitalist, Case management, PT/OT/ST, nephrology  Discussion of management or test with external physician/qualified health care professional: Hospitalist, Case management,    Unique test results reviewed: CBC and BMP, Liver studies, cardiac studies, CXR, echo,     Risk of Complications/Morbidity: High   Illness(es)/ Infection present that pose threat to bodily function.   There is potential for severe exacerbation of condition/side effects of treatment.  Therapy requires intensive monitoring for toxicity        Signed By: Electronically signed by RODY Grande CNP on 1/2/2025 at 1:17 PM   Palliative Medicine   [id number]    January 2, 2025

## 2025-01-09 NOTE — PLAN OF CARE
Problem: Discharge Planning  Goal: Discharge to home or other facility with appropriate resources  1/1/2025 0825 by Mary Boyd RN  Outcome: Progressing  Flowsheets (Taken 1/1/2025 0448 by Sherita Paula RN)  Discharge to home or other facility with appropriate resources:   Identify barriers to discharge with patient and caregiver   Arrange for needed discharge resources and transportation as appropriate   Identify discharge learning needs (meds, wound care, etc)  1/1/2025 0448 by Sherita Paula RN  Outcome: Progressing  Flowsheets (Taken 1/1/2025 0448)  Discharge to home or other facility with appropriate resources:   Identify barriers to discharge with patient and caregiver   Arrange for needed discharge resources and transportation as appropriate   Identify discharge learning needs (meds, wound care, etc)     Problem: Pain  Goal: Verbalizes/displays adequate comfort level or baseline comfort level  1/1/2025 0825 by Mary Boyd RN  Outcome: Progressing  Flowsheets (Taken 1/1/2025 0448 by Sherita Puala RN)  Verbalizes/displays adequate comfort level or baseline comfort level:   Encourage patient to monitor pain and request assistance   Assess pain using appropriate pain scale   Administer analgesics based on type and severity of pain and evaluate response  1/1/2025 0448 by Sherita Paula RN  Outcome: Progressing  Flowsheets (Taken 1/1/2025 0448)  Verbalizes/displays adequate comfort level or baseline comfort level:   Encourage patient to monitor pain and request assistance   Assess pain using appropriate pain scale   Administer analgesics based on type and severity of pain and evaluate response     Problem: Safety - Adult  Goal: Free from fall injury  Outcome: Progressing  Flowsheets (Taken 1/1/2025 0825)  Free From Fall Injury: Instruct family/caregiver on patient safety     Problem: Safety - Adult  Goal: Free from fall injury  Outcome: Progressing  Flowsheets (Taken 1/1/2025 0825)  Free 
  Problem: Discharge Planning  Goal: Discharge to home or other facility with appropriate resources  1/2/2025 1918 by Nate Cooper RN  Outcome: Progressing  1/2/2025 1719 by Lucila Witt RN  Outcome: Progressing     Problem: Pain  Goal: Verbalizes/displays adequate comfort level or baseline comfort level  1/2/2025 1918 by Nate Cooper RN  Outcome: Progressing  1/2/2025 1719 by Lucila Witt RN  Outcome: Progressing     Problem: Safety - Adult  Goal: Free from fall injury  1/2/2025 1918 by Nate Cooper RN  Outcome: Progressing  1/2/2025 1719 by Lucila Witt RN  Outcome: Progressing     Problem: ABCDS Injury Assessment  Goal: Absence of physical injury  1/2/2025 1918 by Nate Cooper RN  Outcome: Progressing  1/2/2025 1719 by Lucila Witt RN  Outcome: Progressing     Problem: ABCDS Injury Assessment  Goal: Absence of physical injury  1/2/2025 1918 by Nate Cooper RN  Outcome: Progressing  1/2/2025 1719 by Lucila Witt RN  Outcome: Progressing     Problem: Skin/Tissue Integrity  Goal: Absence of new skin breakdown  Description: 1.  Monitor for areas of redness and/or skin breakdown  2.  Assess vascular access sites hourly  3.  Every 4-6 hours minimum:  Change oxygen saturation probe site  4.  Every 4-6 hours:  If on nasal continuous positive airway pressure, respiratory therapy assess nares and determine need for appliance change or resting period.  1/2/2025 1918 by Nate Cooper RN  Outcome: Progressing  1/2/2025 1719 by Lucila Witt RN  Outcome: Progressing     
  Problem: Discharge Planning  Goal: Discharge to home or other facility with appropriate resources  1/5/2025 0310 by Ester Rao RN  Outcome: Progressing  Flowsheets (Taken 1/1/2025 0448 by Sherita Paula, RN)  Discharge to home or other facility with appropriate resources:   Identify barriers to discharge with patient and caregiver   Arrange for needed discharge resources and transportation as appropriate   Identify discharge learning needs (meds, wound care, etc)     Problem: Pain  Goal: Verbalizes/displays adequate comfort level or baseline comfort level  1/5/2025 0310 by Ester Rao RN  Outcome: Progressing  Flowsheets (Taken 1/1/2025 0448 by Sherita Paula, RN)  Verbalizes/displays adequate comfort level or baseline comfort level:   Encourage patient to monitor pain and request assistance   Assess pain using appropriate pain scale   Administer analgesics based on type and severity of pain and evaluate response     Problem: Safety - Adult  Goal: Free from fall injury  1/5/2025 0310 by Ester Rao RN  Outcome: Progressing  Flowsheets (Taken 1/3/2025 1014 by Gabriella Marsh, RN)  Free From Fall Injury:   Instruct family/caregiver on patient safety   Based on caregiver fall risk screen, instruct family/caregiver to ask for assistance with transferring infant if caregiver noted to have fall risk factors     Problem: ABCDS Injury Assessment  Goal: Absence of physical injury  1/5/2025 0310 by Ester Rao RN  Outcome: Progressing     Problem: Skin/Tissue Integrity  Goal: Absence of new skin breakdown  Description: 1.  Monitor for areas of redness and/or skin breakdown  2.  Assess vascular access sites hourly  3.  Every 4-6 hours minimum:  Change oxygen saturation probe site  4.  Every 4-6 hours:  If on nasal continuous positive airway pressure, respiratory therapy assess nares and determine need for appliance change or resting period.  1/4/2025 1434 by Lucila Witt, RN  Outcome: Progressing     
  Problem: Discharge Planning  Goal: Discharge to home or other facility with appropriate resources  1/7/2025 2158 by Kristen Rogers RN  Outcome: Progressing  Flowsheets (Taken 1/7/2025 2015)  Discharge to home or other facility with appropriate resources:   Identify barriers to discharge with patient and caregiver   Arrange for needed discharge resources and transportation as appropriate  1/7/2025 1019 by Lori Ngo RN  Outcome: Progressing  Flowsheets (Taken 1/6/2025 2106 by Nneka Murphy, RN)  Discharge to home or other facility with appropriate resources:   Identify barriers to discharge with patient and caregiver   Arrange for needed discharge resources and transportation as appropriate   Identify discharge learning needs (meds, wound care, etc)   Refer to discharge planning if patient needs post-hospital services based on physician order or complex needs related to functional status, cognitive ability or social support system     Problem: Pain  Goal: Verbalizes/displays adequate comfort level or baseline comfort level  1/7/2025 2158 by Kristen Rogers RN  Outcome: Progressing  1/7/2025 1019 by Lori Ngo RN  Outcome: Progressing  Flowsheets (Taken 1/6/2025 2106 by Nneka Murphy RN)  Verbalizes/displays adequate comfort level or baseline comfort level:   Encourage patient to monitor pain and request assistance   Assess pain using appropriate pain scale   Administer analgesics based on type and severity of pain and evaluate response   Implement non-pharmacological measures as appropriate and evaluate response   Consider cultural and social influences on pain and pain management   Notify Licensed Independent Practitioner if interventions unsuccessful or patient reports new pain     Problem: Safety - Adult  Goal: Free from fall injury  1/7/2025 2158 by Kristen Rogers, RN  Outcome: Progressing  1/7/2025 1019 by Lori Ngo RN  Outcome: Progressing  Flowsheets (Taken 1/6/2025 2303 by Nneka Murphy 
  Problem: Discharge Planning  Goal: Discharge to home or other facility with appropriate resources  1/9/2025 1059 by Selam Yancey RN  Outcome: Adequate for Discharge  1/9/2025 0043 by Panfilo Martel RN  Outcome: Progressing     Problem: Pain  Goal: Verbalizes/displays adequate comfort level or baseline comfort level  1/9/2025 1059 by Selam Yancey RN  Outcome: Adequate for Discharge  1/9/2025 0043 by Panfilo Martel RN  Outcome: Progressing     Problem: Safety - Adult  Goal: Free from fall injury  1/9/2025 1059 by Selam Yancey RN  Outcome: Adequate for Discharge  1/9/2025 0043 by Panfilo Martel RN  Outcome: Progressing     Problem: ABCDS Injury Assessment  Goal: Absence of physical injury  1/9/2025 1059 by Selam Yancey RN  Outcome: Adequate for Discharge  1/9/2025 0043 by Panfilo Martel RN  Outcome: Progressing  Flowsheets (Taken 1/9/2025 0043)  Absence of Physical Injury: Implement safety measures based on patient assessment     Problem: Skin/Tissue Integrity  Goal: Absence of new skin breakdown  Description: 1.  Monitor for areas of redness and/or skin breakdown  2.  Assess vascular access sites hourly  3.  Every 4-6 hours minimum:  Change oxygen saturation probe site  4.  Every 4-6 hours:  If on nasal continuous positive airway pressure, respiratory therapy assess nares and determine need for appliance change or resting period.  1/9/2025 1059 by Selam Yancey RN  Outcome: Adequate for Discharge  1/9/2025 0043 by Panfilo Martel RN  Outcome: Progressing     
  Problem: Discharge Planning  Goal: Discharge to home or other facility with appropriate resources  Outcome: Progressing     Problem: Pain  Goal: Verbalizes/displays adequate comfort level or baseline comfort level  Outcome: Progressing     Problem: Safety - Adult  Goal: Free from fall injury  1/3/2025 2328 by Nate Cooper RN  Outcome: Progressing  1/3/2025 1014 by Gabriella Marsh RN  Outcome: Progressing  Flowsheets (Taken 1/3/2025 1014)  Free From Fall Injury:   Instruct family/caregiver on patient safety   Based on caregiver fall risk screen, instruct family/caregiver to ask for assistance with transferring infant if caregiver noted to have fall risk factors     Problem: ABCDS Injury Assessment  Goal: Absence of physical injury  1/3/2025 2328 by Nate Cooper RN  Outcome: Progressing  1/3/2025 1014 by Gabriella Marsh RN  Outcome: Progressing     Problem: Skin/Tissue Integrity  Goal: Absence of new skin breakdown  Description: 1.  Monitor for areas of redness and/or skin breakdown  2.  Assess vascular access sites hourly  3.  Every 4-6 hours minimum:  Change oxygen saturation probe site  4.  Every 4-6 hours:  If on nasal continuous positive airway pressure, respiratory therapy assess nares and determine need for appliance change or resting period.  1/3/2025 2328 by Nate Cooper RN  Outcome: Progressing  1/3/2025 1014 by Gabriella Marsh RN  Outcome: Progressing     
  Problem: Discharge Planning  Goal: Discharge to home or other facility with appropriate resources  Outcome: Progressing     Problem: Pain  Goal: Verbalizes/displays adequate comfort level or baseline comfort level  Outcome: Progressing     Problem: Safety - Adult  Goal: Free from fall injury  12/31/2024 1314 by Lucila Witt RN  Outcome: Progressing     Problem: ABCDS Injury Assessment  Goal: Absence of physical injury  12/31/2024 1314 by Lucila Witt, RN  Outcome: Progressing     
  Problem: Discharge Planning  Goal: Discharge to home or other facility with appropriate resources  Outcome: Progressing     Problem: Pain  Goal: Verbalizes/displays adequate comfort level or baseline comfort level  Outcome: Progressing     Problem: Safety - Adult  Goal: Free from fall injury  Outcome: Progressing     
  Problem: Discharge Planning  Goal: Discharge to home or other facility with appropriate resources  Outcome: Progressing     Problem: Pain  Goal: Verbalizes/displays adequate comfort level or baseline comfort level  Outcome: Progressing     Problem: Safety - Adult  Goal: Free from fall injury  Outcome: Progressing     Problem: ABCDS Injury Assessment  Goal: Absence of physical injury  Outcome: Progressing     
  Problem: Discharge Planning  Goal: Discharge to home or other facility with appropriate resources  Outcome: Progressing     Problem: Pain  Goal: Verbalizes/displays adequate comfort level or baseline comfort level  Outcome: Progressing     Problem: Safety - Adult  Goal: Free from fall injury  Outcome: Progressing     Problem: ABCDS Injury Assessment  Goal: Absence of physical injury  Outcome: Progressing     Problem: Skin/Tissue Integrity  Goal: Absence of new skin breakdown  Description: 1.  Monitor for areas of redness and/or skin breakdown  2.  Assess vascular access sites hourly  3.  Every 4-6 hours minimum:  Change oxygen saturation probe site  4.  Every 4-6 hours:  If on nasal continuous positive airway pressure, respiratory therapy assess nares and determine need for appliance change or resting period.  Outcome: Progressing     
  Problem: Discharge Planning  Goal: Discharge to home or other facility with appropriate resources  Outcome: Progressing     Problem: Pain  Goal: Verbalizes/displays adequate comfort level or baseline comfort level  Outcome: Progressing     Problem: Safety - Adult  Goal: Free from fall injury  Outcome: Progressing     Problem: ABCDS Injury Assessment  Goal: Absence of physical injury  Outcome: Progressing  Flowsheets (Taken 1/9/2025 0043)  Absence of Physical Injury: Implement safety measures based on patient assessment     Problem: Skin/Tissue Integrity  Goal: Absence of new skin breakdown  Description: 1.  Monitor for areas of redness and/or skin breakdown  2.  Assess vascular access sites hourly  3.  Every 4-6 hours minimum:  Change oxygen saturation probe site  4.  Every 4-6 hours:  If on nasal continuous positive airway pressure, respiratory therapy assess nares and determine need for appliance change or resting period.  Outcome: Progressing     
  Problem: Discharge Planning  Goal: Discharge to home or other facility with appropriate resources  Outcome: Progressing  Flowsheets (Taken 1/1/2025 0448 by Sherita Paula, RN)  Discharge to home or other facility with appropriate resources:   Identify barriers to discharge with patient and caregiver   Arrange for needed discharge resources and transportation as appropriate   Identify discharge learning needs (meds, wound care, etc)     Problem: Pain  Goal: Verbalizes/displays adequate comfort level or baseline comfort level  Outcome: Progressing  Flowsheets (Taken 1/1/2025 0448 by Sherita Paula, RN)  Verbalizes/displays adequate comfort level or baseline comfort level:   Encourage patient to monitor pain and request assistance   Assess pain using appropriate pain scale   Administer analgesics based on type and severity of pain and evaluate response     Problem: Safety - Adult  Goal: Free from fall injury  Outcome: Progressing  Flowsheets (Taken 1/3/2025 1014 by Gabriella Marsh, RN)  Free From Fall Injury:   Instruct family/caregiver on patient safety   Based on caregiver fall risk screen, instruct family/caregiver to ask for assistance with transferring infant if caregiver noted to have fall risk factors     
  Problem: Discharge Planning  Goal: Discharge to home or other facility with appropriate resources  Outcome: Progressing  Flowsheets (Taken 1/1/2025 0448)  Discharge to home or other facility with appropriate resources:   Identify barriers to discharge with patient and caregiver   Arrange for needed discharge resources and transportation as appropriate   Identify discharge learning needs (meds, wound care, etc)     Problem: Pain  Goal: Verbalizes/displays adequate comfort level or baseline comfort level  Outcome: Progressing  Flowsheets (Taken 1/1/2025 0448)  Verbalizes/displays adequate comfort level or baseline comfort level:   Encourage patient to monitor pain and request assistance   Assess pain using appropriate pain scale   Administer analgesics based on type and severity of pain and evaluate response     
  Problem: Pain  Goal: Verbalizes/displays adequate comfort level or baseline comfort level  Outcome: Progressing     Problem: Safety - Adult  Goal: Free from fall injury  Outcome: Progressing     Problem: ABCDS Injury Assessment  Goal: Absence of physical injury  Outcome: Progressing     
  Problem: Safety - Adult  Goal: Free from fall injury  12/31/2024 0022 by Irma Cody, RN  Flowsheets (Taken 12/31/2024 0022)  Free From Fall Injury:   Instruct family/caregiver on patient safety   Based on caregiver fall risk screen, instruct family/caregiver to ask for assistance with transferring infant if caregiver noted to have fall risk factors  12/30/2024 1704 by Lucila Witt, RN  Outcome: Progressing     Problem: ABCDS Injury Assessment  Goal: Absence of physical injury  Flowsheets (Taken 12/31/2024 0022)  Absence of Physical Injury: Implement safety measures based on patient assessment     
  Problem: Safety - Adult  Goal: Free from fall injury  Outcome: Progressing  Flowsheets (Taken 1/3/2025 1014)  Free From Fall Injury:   Instruct family/caregiver on patient safety   Based on caregiver fall risk screen, instruct family/caregiver to ask for assistance with transferring infant if caregiver noted to have fall risk factors     Problem: ABCDS Injury Assessment  Goal: Absence of physical injury  Outcome: Progressing     Problem: Skin/Tissue Integrity  Goal: Absence of new skin breakdown  Description: 1.  Monitor for areas of redness and/or skin breakdown  2.  Assess vascular access sites hourly  3.  Every 4-6 hours minimum:  Change oxygen saturation probe site  4.  Every 4-6 hours:  If on nasal continuous positive airway pressure, respiratory therapy assess nares and determine need for appliance change or resting period.  Outcome: Progressing     
PT eval complete, goal to return to baseline function  
Problem: Discharge Planning  Goal: Discharge to home or other facility with appropriate resources  1/6/2025 2303 by Nneka Murphy RN  Outcome: Progressing  Flowsheets (Taken 1/6/2025 2106)  Discharge to home or other facility with appropriate resources:   Identify barriers to discharge with patient and caregiver   Arrange for needed discharge resources and transportation as appropriate   Identify discharge learning needs (meds, wound care, etc)   Refer to discharge planning if patient needs post-hospital services based on physician order or complex needs related to functional status, cognitive ability or social support system     Problem: Pain  Goal: Verbalizes/displays adequate comfort level or baseline comfort level  1/6/2025 2303 by Nneka Murphy RN  Outcome: Progressing  Flowsheets (Taken 1/6/2025 2106)  Verbalizes/displays adequate comfort level or baseline comfort level:   Encourage patient to monitor pain and request assistance   Assess pain using appropriate pain scale   Administer analgesics based on type and severity of pain and evaluate response   Implement non-pharmacological measures as appropriate and evaluate response   Consider cultural and social influences on pain and pain management   Notify Licensed Independent Practitioner if interventions unsuccessful or patient reports new pain     Problem: Safety - Adult  Goal: Free from fall injury  1/6/2025 2303 by Nneka Murphy, RN  Outcome: Progressing  Flowsheets (Taken 1/6/2025 2303)  Free From Fall Injury:   Instruct family/caregiver on patient safety   Based on caregiver fall risk screen, instruct family/caregiver to ask for assistance with transferring infant if caregiver noted to have fall risk factors     Problem: ABCDS Injury Assessment  Goal: Absence of physical injury  1/6/2025 2303 by Nneka Murphy RN  Outcome: Progressing     Problem: Skin/Tissue Integrity  Goal: Absence of new skin breakdown  Description: 1.  Monitor for areas of redness and/or skin 
UE There ex, Bed mobility, Transfers, ADL training, Adaptative equipment training, Therapeutic activity, Neuromuscular re-education, Patient education   
  Problem: ABCDS Injury Assessment  Goal: Absence of physical injury  1/8/2025 1040 by Lori Ngo RN  Outcome: Progressing  Flowsheets (Taken 1/7/2025 2015 by Kristen Rogers, RN)  Absence of Physical Injury: Implement safety measures based on patient assessment  1/7/2025 2158 by Kristen Rogers, RN  Outcome: Progressing  Flowsheets (Taken 1/7/2025 2015)  Absence of Physical Injury: Implement safety measures based on patient assessment     Problem: Skin/Tissue Integrity  Goal: Absence of new skin breakdown  Description: 1.  Monitor for areas of redness and/or skin breakdown  2.  Assess vascular access sites hourly  3.  Every 4-6 hours minimum:  Change oxygen saturation probe site  4.  Every 4-6 hours:  If on nasal continuous positive airway pressure, respiratory therapy assess nares and determine need for appliance change or resting period.  1/8/2025 1040 by Lori Ngo, RN  Outcome: Progressing  1/7/2025 2158 by Kristen Rogers, RN  Outcome: Progressing     
family/caregiver to ask for assistance with transferring infant if caregiver noted to have fall risk factors  1/6/2025 2303 by Nneka Murphy RN  Outcome: Progressing  Flowsheets (Taken 1/6/2025 2303)  Free From Fall Injury:   Instruct family/caregiver on patient safety   Based on caregiver fall risk screen, instruct family/caregiver to ask for assistance with transferring infant if caregiver noted to have fall risk factors  1/6/2025 2303 by Nneka Murphy RN  Outcome: Progressing  Flowsheets (Taken 1/6/2025 2303)  Free From Fall Injury:   Instruct family/caregiver on patient safety   Based on caregiver fall risk screen, instruct family/caregiver to ask for assistance with transferring infant if caregiver noted to have fall risk factors     Problem: ABCDS Injury Assessment  Goal: Absence of physical injury  1/7/2025 1019 by Lori Ngo RN  Outcome: Progressing  Flowsheets (Taken 1/6/2025 2303 by Nneka Murphy RN)  Absence of Physical Injury: Implement safety measures based on patient assessment  1/6/2025 2303 by Nneka Murphy RN  Outcome: Progressing  Flowsheets (Taken 1/6/2025 2303)  Absence of Physical Injury: Implement safety measures based on patient assessment  1/6/2025 2303 by Nneka Murphy RN  Outcome: Progressing  Flowsheets (Taken 1/6/2025 2303)  Absence of Physical Injury: Implement safety measures based on patient assessment     Problem: Skin/Tissue Integrity  Goal: Absence of new skin breakdown  Description: 1.  Monitor for areas of redness and/or skin breakdown  2.  Assess vascular access sites hourly  3.  Every 4-6 hours minimum:  Change oxygen saturation probe site  4.  Every 4-6 hours:  If on nasal continuous positive airway pressure, respiratory therapy assess nares and determine need for appliance change or resting period.  1/7/2025 1019 by Lori Ngo RN  Outcome: Progressing  1/6/2025 2303 by Nneka Murphy RN  Outcome: Progressing  1/6/2025 2303 by Nneka Murphy RN  Outcome: Progressing

## 2025-01-09 NOTE — CARE COORDINATION
Case Management Discharge Summary- Hospice Discharge    Destination:   [facility] Agency name and contact: Sherita    Durable Equipment arrangements are completed by the Hospice nurse.delivered yesterday    [place] DNR signed and placed in discharge packet AND patient's hard chart. (This is required for DNR patients.)yes    Signed ECOC/AVS faxed to above agency/facility.yes    Transportation arrangements per Hospice RN.  Transport agency name and  time: [facility] [employer] 2pm    Transport form completed and signed by:  live  Transport form placed with discharge packet: yes    Notified Family:Yesenia per [facility]  Contact name:    Patient's RN notified of plan:yes    Note:    Discharging nurse to complete nursing portion of ECOC, reconcile AVS, place final copy with patient's discharge packet.  RN to ensure signed prescriptions are sent home with patient or the facility as per nursing protocol.      Elisabeth Muñoz RN

## 2025-01-10 NOTE — DISCHARGE SUMMARY
V2.0  Discharge Summary    Name:  Radha Magallon /Age/Sex: 1935 (89 y.o. female)   Admit Date: 2024  Discharge Date: 25    MRN & CSN:  [id number] & [id number] Encounter Date and Time 25 7:04 PM EST    Attending:  No att. providers found Discharging Provider: Elliott Marie DO       Hospital Course:     Brief HPI: Radha Magallon is a/an 89 y.o. female with a significant past medical history of hypertension, hyperlipidemia, hypothyroidism, and dementia who presents to [facility]'s emergency department with daughters at bedside who note they were called by the care facility after finding the patient slumped over in her chair in her room. Staff reported she was moaning and minimally responsive so she was brought here for an evaluation. Daughters note she has a new cough as of today, but she was exposed to a granddaughter on Amena Day who turns out had COVID. Her evaluation here included laboratory studies, EKG, and chest x-ray. Chest x-ray did not show acute findings. Laboratory studies were reviewed, pertinent for sodium 130, chloride 95, CO2 20 , glucose 105, CRP 10, troponin 30->28, and unremarkable cell count. Urinalysis showed cloudy urine, 40 ketones, trace LE; microscopy showed 6-9 urinary WBCs, 1+ bacteria. COVID test was positive. She is not requiring oxygen at this time. ED felt compelled to admit for further evaluation. Hospital team was consulted to admit. Patient was monitored during admission, had a rapid response called for a syncopal event in chair, evaluation after event was negative for acute process, no seizure no cardiopulmonary event. Likely secondary to hypotension from poor oral intake and current medications to control tremors. Patient is medically stable for discharge pending placement.     Brief Problem Based Course:   COVID-19 viremia:   Patient was COVID-19 + (2024)  Patient symptom-free  On room air  Pyuria with bacteriuria (resolved)  Has